# Patient Record
Sex: FEMALE | Race: WHITE | NOT HISPANIC OR LATINO | Employment: OTHER | URBAN - METROPOLITAN AREA
[De-identification: names, ages, dates, MRNs, and addresses within clinical notes are randomized per-mention and may not be internally consistent; named-entity substitution may affect disease eponyms.]

---

## 2017-06-01 ENCOUNTER — APPOINTMENT (OUTPATIENT)
Dept: RADIOLOGY | Facility: MEDICAL CENTER | Age: 82
DRG: 493 | End: 2017-06-01
Attending: EMERGENCY MEDICINE
Payer: MEDICARE

## 2017-06-01 ENCOUNTER — APPOINTMENT (OUTPATIENT)
Dept: RADIOLOGY | Facility: MEDICAL CENTER | Age: 82
DRG: 493 | End: 2017-06-01
Attending: ORTHOPAEDIC SURGERY
Payer: MEDICARE

## 2017-06-01 ENCOUNTER — HOSPITAL ENCOUNTER (INPATIENT)
Facility: MEDICAL CENTER | Age: 82
LOS: 1 days | DRG: 493 | End: 2017-06-05
Attending: EMERGENCY MEDICINE | Admitting: HOSPITALIST
Payer: MEDICARE

## 2017-06-01 ENCOUNTER — RESOLUTE PROFESSIONAL BILLING HOSPITAL PROF FEE (OUTPATIENT)
Dept: HOSPITALIST | Facility: MEDICAL CENTER | Age: 82
End: 2017-06-01
Payer: MEDICARE

## 2017-06-01 DIAGNOSIS — S82.891A ANKLE FRACTURE, RIGHT, CLOSED, INITIAL ENCOUNTER: ICD-10-CM

## 2017-06-01 LAB
ALBUMIN SERPL BCP-MCNC: 4.3 G/DL (ref 3.2–4.9)
ALBUMIN/GLOB SERPL: 1.2 G/DL
ALP SERPL-CCNC: 94 U/L (ref 30–99)
ALT SERPL-CCNC: 16 U/L (ref 2–50)
ANION GAP SERPL CALC-SCNC: 12 MMOL/L (ref 0–11.9)
APTT PPP: 27.8 SEC (ref 24.7–36)
AST SERPL-CCNC: 23 U/L (ref 12–45)
BASOPHILS # BLD AUTO: 1.7 % (ref 0–1.8)
BASOPHILS # BLD: 0.21 K/UL (ref 0–0.12)
BILIRUB SERPL-MCNC: 0.5 MG/DL (ref 0.1–1.5)
BUN SERPL-MCNC: 12 MG/DL (ref 8–22)
CALCIUM SERPL-MCNC: 9.6 MG/DL (ref 8.5–10.5)
CHLORIDE SERPL-SCNC: 105 MMOL/L (ref 96–112)
CO2 SERPL-SCNC: 23 MMOL/L (ref 20–33)
CREAT SERPL-MCNC: 0.76 MG/DL (ref 0.5–1.4)
EKG IMPRESSION: NORMAL
EOSINOPHIL # BLD AUTO: 0.33 K/UL (ref 0–0.51)
EOSINOPHIL NFR BLD: 2.6 % (ref 0–6.9)
ERYTHROCYTE [DISTWIDTH] IN BLOOD BY AUTOMATED COUNT: 45.7 FL (ref 35.9–50)
GFR SERPL CREATININE-BSD FRML MDRD: >60 ML/MIN/1.73 M 2
GLOBULIN SER CALC-MCNC: 3.5 G/DL (ref 1.9–3.5)
GLUCOSE SERPL-MCNC: 124 MG/DL (ref 65–99)
HCT VFR BLD AUTO: 36.8 % (ref 37–47)
HGB BLD-MCNC: 12.2 G/DL (ref 12–16)
INR PPP: 0.95 (ref 0.87–1.13)
LYMPHOCYTES # BLD AUTO: 1.53 K/UL (ref 1–4.8)
LYMPHOCYTES NFR BLD: 12.2 % (ref 22–41)
MAGNESIUM SERPL-MCNC: 1.8 MG/DL (ref 1.5–2.5)
MANUAL DIFF BLD: NORMAL
MCH RBC QN AUTO: 30 PG (ref 27–33)
MCHC RBC AUTO-ENTMCNC: 33.2 G/DL (ref 33.6–35)
MCV RBC AUTO: 90.6 FL (ref 81.4–97.8)
MONOCYTES # BLD AUTO: 0.65 K/UL (ref 0–0.85)
MONOCYTES NFR BLD AUTO: 5.2 % (ref 0–13.4)
MORPHOLOGY BLD-IMP: NORMAL
NEUTROPHILS # BLD AUTO: 9.79 K/UL (ref 2–7.15)
NEUTROPHILS NFR BLD: 78.3 % (ref 44–72)
NRBC # BLD AUTO: 0 K/UL
NRBC BLD AUTO-RTO: 0 /100 WBC
PLATELET # BLD AUTO: 382 K/UL (ref 164–446)
PLATELET BLD QL SMEAR: NORMAL
PMV BLD AUTO: 10.3 FL (ref 9–12.9)
POTASSIUM SERPL-SCNC: 3.5 MMOL/L (ref 3.6–5.5)
PROT SERPL-MCNC: 7.8 G/DL (ref 6–8.2)
PROTHROMBIN TIME: 13 SEC (ref 12–14.6)
RBC # BLD AUTO: 4.06 M/UL (ref 4.2–5.4)
RBC BLD AUTO: NORMAL
SODIUM SERPL-SCNC: 140 MMOL/L (ref 135–145)
WBC # BLD AUTO: 12.5 K/UL (ref 4.8–10.8)

## 2017-06-01 PROCEDURE — 29515 APPLICATION SHORT LEG SPLINT: CPT

## 2017-06-01 PROCEDURE — 0SSF04Z REPOSITION RIGHT ANKLE JOINT WITH INTERNAL FIXATION DEVICE, OPEN APPROACH: ICD-10-PCS | Performed by: ORTHOPAEDIC SURGERY

## 2017-06-01 PROCEDURE — 160002 HCHG RECOVERY MINUTES (STAT): Performed by: ORTHOPAEDIC SURGERY

## 2017-06-01 PROCEDURE — 502240 HCHG MISC OR SUPPLY RC 0272: Performed by: ORTHOPAEDIC SURGERY

## 2017-06-01 PROCEDURE — 500054 HCHG BANDAGE, ELASTIC 6: Performed by: ORTHOPAEDIC SURGERY

## 2017-06-01 PROCEDURE — 96375 TX/PRO/DX INJ NEW DRUG ADDON: CPT

## 2017-06-01 PROCEDURE — 73600 X-RAY EXAM OF ANKLE: CPT | Mod: RT

## 2017-06-01 PROCEDURE — 160022 HCHG BLOCK: Performed by: ORTHOPAEDIC SURGERY

## 2017-06-01 PROCEDURE — 27840 TREAT ANKLE DISLOCATION: CPT

## 2017-06-01 PROCEDURE — 0QSJXZZ REPOSITION RIGHT FIBULA, EXTERNAL APPROACH: ICD-10-PCS | Performed by: EMERGENCY MEDICINE

## 2017-06-01 PROCEDURE — 501445 HCHG STAPLER, SKIN DISP: Performed by: ORTHOPAEDIC SURGERY

## 2017-06-01 PROCEDURE — 700111 HCHG RX REV CODE 636 W/ 250 OVERRIDE (IP)

## 2017-06-01 PROCEDURE — G0378 HOSPITAL OBSERVATION PER HR: HCPCS

## 2017-06-01 PROCEDURE — 160009 HCHG ANES TIME/MIN: Performed by: ORTHOPAEDIC SURGERY

## 2017-06-01 PROCEDURE — 96374 THER/PROPH/DIAG INJ IV PUSH: CPT

## 2017-06-01 PROCEDURE — 0QSGXZZ REPOSITION RIGHT TIBIA, EXTERNAL APPROACH: ICD-10-PCS | Performed by: EMERGENCY MEDICINE

## 2017-06-01 PROCEDURE — 500881 HCHG PACK, EXTREMITY: Performed by: ORTHOPAEDIC SURGERY

## 2017-06-01 PROCEDURE — 700101 HCHG RX REV CODE 250

## 2017-06-01 PROCEDURE — 0QSJ04Z REPOSITION RIGHT FIBULA WITH INTERNAL FIXATION DEVICE, OPEN APPROACH: ICD-10-PCS | Performed by: ORTHOPAEDIC SURGERY

## 2017-06-01 PROCEDURE — 99285 EMERGENCY DEPT VISIT HI MDM: CPT

## 2017-06-01 PROCEDURE — 160029 HCHG SURGERY MINUTES - 1ST 30 MINS LEVEL 4: Performed by: ORTHOPAEDIC SURGERY

## 2017-06-01 PROCEDURE — A6222 GAUZE <=16 IN NO W/SAL W/O B: HCPCS | Performed by: ORTHOPAEDIC SURGERY

## 2017-06-01 PROCEDURE — 700105 HCHG RX REV CODE 258: Performed by: HOSPITALIST

## 2017-06-01 PROCEDURE — 73590 X-RAY EXAM OF LOWER LEG: CPT | Mod: RT

## 2017-06-01 PROCEDURE — 0QSG04Z REPOSITION RIGHT TIBIA WITH INTERNAL FIXATION DEVICE, OPEN APPROACH: ICD-10-PCS | Performed by: ORTHOPAEDIC SURGERY

## 2017-06-01 PROCEDURE — C1713 ANCHOR/SCREW BN/BN,TIS/BN: HCPCS | Performed by: ORTHOPAEDIC SURGERY

## 2017-06-01 PROCEDURE — 700111 HCHG RX REV CODE 636 W/ 250 OVERRIDE (IP): Performed by: HOSPITALIST

## 2017-06-01 PROCEDURE — A9270 NON-COVERED ITEM OR SERVICE: HCPCS | Performed by: HOSPITALIST

## 2017-06-01 PROCEDURE — 700111 HCHG RX REV CODE 636 W/ 250 OVERRIDE (IP): Performed by: EMERGENCY MEDICINE

## 2017-06-01 PROCEDURE — 302135 SEQUENTIAL COMPRESSION MACHINE: Performed by: ORTHOPAEDIC SURGERY

## 2017-06-01 PROCEDURE — 80053 COMPREHEN METABOLIC PANEL: CPT

## 2017-06-01 PROCEDURE — 160036 HCHG PACU - EA ADDL 30 MINS PHASE I: Performed by: ORTHOPAEDIC SURGERY

## 2017-06-01 PROCEDURE — 99219 PR INITIAL OBSERVATION CARE,LEVL II: CPT | Performed by: HOSPITALIST

## 2017-06-01 PROCEDURE — 160035 HCHG PACU - 1ST 60 MINS PHASE I: Performed by: ORTHOPAEDIC SURGERY

## 2017-06-01 PROCEDURE — 51798 US URINE CAPACITY MEASURE: CPT

## 2017-06-01 PROCEDURE — 71010 DX-CHEST-PORTABLE (1 VIEW): CPT

## 2017-06-01 PROCEDURE — 93005 ELECTROCARDIOGRAM TRACING: CPT | Performed by: EMERGENCY MEDICINE

## 2017-06-01 PROCEDURE — 501838 HCHG SUTURE GENERAL: Performed by: ORTHOPAEDIC SURGERY

## 2017-06-01 PROCEDURE — 700101 HCHG RX REV CODE 250: Performed by: HOSPITALIST

## 2017-06-01 PROCEDURE — 160041 HCHG SURGERY MINUTES - EA ADDL 1 MIN LEVEL 4: Performed by: ORTHOPAEDIC SURGERY

## 2017-06-01 PROCEDURE — 302874 HCHG BANDAGE ACE 2 OR 3""

## 2017-06-01 PROCEDURE — 160048 HCHG OR STATISTICAL LEVEL 1-5: Performed by: ORTHOPAEDIC SURGERY

## 2017-06-01 PROCEDURE — 85730 THROMBOPLASTIN TIME PARTIAL: CPT

## 2017-06-01 PROCEDURE — 700102 HCHG RX REV CODE 250 W/ 637 OVERRIDE(OP)

## 2017-06-01 PROCEDURE — 700102 HCHG RX REV CODE 250 W/ 637 OVERRIDE(OP): Performed by: HOSPITALIST

## 2017-06-01 PROCEDURE — 85027 COMPLETE CBC AUTOMATED: CPT

## 2017-06-01 PROCEDURE — 85007 BL SMEAR W/DIFF WBC COUNT: CPT

## 2017-06-01 PROCEDURE — A9270 NON-COVERED ITEM OR SERVICE: HCPCS

## 2017-06-01 PROCEDURE — 85610 PROTHROMBIN TIME: CPT

## 2017-06-01 PROCEDURE — 83735 ASSAY OF MAGNESIUM: CPT

## 2017-06-01 PROCEDURE — 36415 COLL VENOUS BLD VENIPUNCTURE: CPT

## 2017-06-01 PROCEDURE — 502000 HCHG MISC OR IMPLANTS RC 0278: Performed by: ORTHOPAEDIC SURGERY

## 2017-06-01 PROCEDURE — 503036 HCHG GUIDE PIN,OIC: Performed by: ORTHOPAEDIC SURGERY

## 2017-06-01 DEVICE — SCREW 2.5 MM NON-LOCKING TI X 22MM LONG (6TX8=48): Type: IMPLANTABLE DEVICE | Status: FUNCTIONAL

## 2017-06-01 DEVICE — PLATE DISTAL FIBULA 7H (2TX2+2TX1=6): Type: IMPLANTABLE DEVICE | Status: FUNCTIONAL

## 2017-06-01 DEVICE — SCREW 3.5 MM NON-LOCKING TI X 12MM LONG (6TX8+2TX5=58): Type: IMPLANTABLE DEVICE | Status: FUNCTIONAL

## 2017-06-01 DEVICE — SCREW 2.5 MM LOCKING TI X 10MM LONG (6TX8=48): Type: IMPLANTABLE DEVICE | Status: FUNCTIONAL

## 2017-06-01 DEVICE — SCREW 2.5 MM LOCKING TI X 12MM LONG (6TX8=48): Type: IMPLANTABLE DEVICE | Status: FUNCTIONAL

## 2017-06-01 DEVICE — SCREW 3.5 MM NON-LOCKING TI X 45MM LONG (6TX8=48): Type: IMPLANTABLE DEVICE | Status: FUNCTIONAL

## 2017-06-01 DEVICE — SCREW CANN 4.0X46 LONG OIC - (3TX2=6): Type: IMPLANTABLE DEVICE | Status: FUNCTIONAL

## 2017-06-01 DEVICE — SCREW 3.5 MM LOCKING TI X 12MM LONG (6TX8+2TX5=58): Type: IMPLANTABLE DEVICE | Status: FUNCTIONAL

## 2017-06-01 RX ORDER — CITALOPRAM 20 MG/1
20 TABLET ORAL DAILY
Status: DISCONTINUED | OUTPATIENT
Start: 2017-06-01 | End: 2017-06-05 | Stop reason: HOSPADM

## 2017-06-01 RX ORDER — ONDANSETRON 2 MG/ML
4 INJECTION INTRAMUSCULAR; INTRAVENOUS EVERY 4 HOURS PRN
Status: DISCONTINUED | OUTPATIENT
Start: 2017-06-01 | End: 2017-06-05 | Stop reason: HOSPADM

## 2017-06-01 RX ORDER — TRAMADOL HYDROCHLORIDE 50 MG/1
50 TABLET ORAL EVERY 6 HOURS PRN
Status: DISCONTINUED | OUTPATIENT
Start: 2017-06-01 | End: 2017-06-05 | Stop reason: HOSPADM

## 2017-06-01 RX ORDER — AMLODIPINE BESYLATE 10 MG/1
10 TABLET ORAL DAILY
Status: DISCONTINUED | OUTPATIENT
Start: 2017-06-01 | End: 2017-06-05 | Stop reason: HOSPADM

## 2017-06-01 RX ORDER — ONDANSETRON 4 MG/1
4 TABLET, ORALLY DISINTEGRATING ORAL EVERY 4 HOURS PRN
Status: DISCONTINUED | OUTPATIENT
Start: 2017-06-01 | End: 2017-06-05 | Stop reason: HOSPADM

## 2017-06-01 RX ORDER — BISACODYL 10 MG
10 SUPPOSITORY, RECTAL RECTAL
Status: DISCONTINUED | OUTPATIENT
Start: 2017-06-02 | End: 2017-06-05 | Stop reason: HOSPADM

## 2017-06-01 RX ORDER — CITALOPRAM 20 MG/1
20 TABLET ORAL DAILY
COMMUNITY
End: 2017-06-10

## 2017-06-01 RX ORDER — AMOXICILLIN 250 MG
2 CAPSULE ORAL 2 TIMES DAILY
Status: DISCONTINUED | OUTPATIENT
Start: 2017-06-02 | End: 2017-06-05 | Stop reason: HOSPADM

## 2017-06-01 RX ORDER — MORPHINE SULFATE 4 MG/ML
2-4 INJECTION, SOLUTION INTRAMUSCULAR; INTRAVENOUS
Status: DISCONTINUED | OUTPATIENT
Start: 2017-06-01 | End: 2017-06-05 | Stop reason: HOSPADM

## 2017-06-01 RX ORDER — OXYCODONE HYDROCHLORIDE 10 MG/1
10 TABLET ORAL EVERY 4 HOURS PRN
Status: DISCONTINUED | OUTPATIENT
Start: 2017-06-01 | End: 2017-06-05 | Stop reason: HOSPADM

## 2017-06-01 RX ORDER — MORPHINE SULFATE 4 MG/ML
2 INJECTION, SOLUTION INTRAMUSCULAR; INTRAVENOUS
Status: DISCONTINUED | OUTPATIENT
Start: 2017-06-01 | End: 2017-06-01

## 2017-06-01 RX ORDER — OXYCODONE HCL 5 MG/5 ML
SOLUTION, ORAL ORAL
Status: DISPENSED
Start: 2017-06-01 | End: 2017-06-02

## 2017-06-01 RX ORDER — ACETAMINOPHEN 325 MG/1
650 TABLET ORAL EVERY 6 HOURS PRN
Status: DISCONTINUED | OUTPATIENT
Start: 2017-06-01 | End: 2017-06-05 | Stop reason: HOSPADM

## 2017-06-01 RX ORDER — DEXAMETHASONE SODIUM PHOSPHATE 4 MG/ML
10 INJECTION, SOLUTION INTRA-ARTICULAR; INTRALESIONAL; INTRAMUSCULAR; INTRAVENOUS; SOFT TISSUE ONCE
Status: COMPLETED | OUTPATIENT
Start: 2017-06-02 | End: 2017-06-02

## 2017-06-01 RX ORDER — SODIUM CHLORIDE AND POTASSIUM CHLORIDE 150; 900 MG/100ML; MG/100ML
INJECTION, SOLUTION INTRAVENOUS ONCE
Status: COMPLETED | OUTPATIENT
Start: 2017-06-01 | End: 2017-06-01

## 2017-06-01 RX ORDER — OXYCODONE HYDROCHLORIDE 5 MG/1
5 TABLET ORAL EVERY 4 HOURS PRN
Status: DISCONTINUED | OUTPATIENT
Start: 2017-06-01 | End: 2017-06-05 | Stop reason: HOSPADM

## 2017-06-01 RX ORDER — ENALAPRILAT 1.25 MG/ML
1.25 INJECTION INTRAVENOUS EVERY 6 HOURS PRN
Status: DISCONTINUED | OUTPATIENT
Start: 2017-06-01 | End: 2017-06-05 | Stop reason: HOSPADM

## 2017-06-01 RX ORDER — ONDANSETRON 2 MG/ML
4 INJECTION INTRAMUSCULAR; INTRAVENOUS ONCE
Status: COMPLETED | OUTPATIENT
Start: 2017-06-01 | End: 2017-06-01

## 2017-06-01 RX ORDER — POLYETHYLENE GLYCOL 3350 17 G/17G
1 POWDER, FOR SOLUTION ORAL
Status: DISCONTINUED | OUTPATIENT
Start: 2017-06-02 | End: 2017-06-05 | Stop reason: HOSPADM

## 2017-06-01 RX ORDER — AMLODIPINE BESYLATE 10 MG/1
10 TABLET ORAL DAILY
COMMUNITY
End: 2021-08-16

## 2017-06-01 RX ORDER — ROSUVASTATIN CALCIUM 40 MG/1
40 TABLET, COATED ORAL
COMMUNITY
End: 2019-05-07 | Stop reason: CLARIF

## 2017-06-01 RX ORDER — ROSUVASTATIN CALCIUM 20 MG/1
40 TABLET, COATED ORAL
Status: DISCONTINUED | OUTPATIENT
Start: 2017-06-01 | End: 2017-06-05 | Stop reason: HOSPADM

## 2017-06-01 RX ADMIN — POTASSIUM CHLORIDE AND SODIUM CHLORIDE: 900; 150 INJECTION, SOLUTION INTRAVENOUS at 12:22

## 2017-06-01 RX ADMIN — ONDANSETRON 4 MG: 2 INJECTION INTRAMUSCULAR; INTRAVENOUS at 08:11

## 2017-06-01 RX ADMIN — AMLODIPINE BESYLATE 10 MG: 10 TABLET ORAL at 12:23

## 2017-06-01 RX ADMIN — CITALOPRAM HYDROBROMIDE 20 MG: 20 TABLET ORAL at 12:23

## 2017-06-01 RX ADMIN — MORPHINE SULFATE 2 MG: 4 INJECTION INTRAVENOUS at 08:11

## 2017-06-01 RX ADMIN — ROSUVASTATIN CALCIUM 40 MG: 20 TABLET ORAL at 21:10

## 2017-06-01 RX ADMIN — MAGNESIUM SULFATE HEPTAHYDRATE 2 G: 500 INJECTION, SOLUTION INTRAMUSCULAR; INTRAVENOUS at 12:23

## 2017-06-01 ASSESSMENT — PAIN SCALES - GENERAL
PAINLEVEL_OUTOF10: 2
PAINLEVEL_OUTOF10: 5
PAINLEVEL_OUTOF10: 0
PAINLEVEL_OUTOF10: 2

## 2017-06-01 ASSESSMENT — LIFESTYLE VARIABLES
EVER HAD A DRINK FIRST THING IN THE MORNING TO STEADY YOUR NERVES TO GET RID OF A HANGOVER: NO
HAVE PEOPLE ANNOYED YOU BY CRITICIZING YOUR DRINKING: NO
TOTAL SCORE: 0
AVERAGE NUMBER OF DAYS PER WEEK YOU HAVE A DRINK CONTAINING ALCOHOL: 0
CONSUMPTION TOTAL: NEGATIVE
HAVE YOU EVER FELT YOU SHOULD CUT DOWN ON YOUR DRINKING: NO
TOTAL SCORE: 0
DO YOU DRINK ALCOHOL: YES
EVER_SMOKED: NEVER
ON A TYPICAL DAY WHEN YOU DRINK ALCOHOL HOW MANY DRINKS DO YOU HAVE: 1
TOTAL SCORE: 0
EVER FELT BAD OR GUILTY ABOUT YOUR DRINKING: NO
HOW MANY TIMES IN THE PAST YEAR HAVE YOU HAD 5 OR MORE DRINKS IN A DAY: 0

## 2017-06-01 NOTE — ED NOTES
Rounded on pt. Pt attempted to urinate with bedpan, unsuccessful. Updated on POC, aware of plan for admission. States no other needs at this time.

## 2017-06-01 NOTE — ED NOTES
Pt brought to [facility] via wheelchair, accompanied by family. 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 Pulses strong. Pt assisted onto gurney, attached to monitor.

## 2017-06-01 NOTE — IP AVS SNAPSHOT
" <p align=\"LEFT\"><IMG SRC=\"[email or web address]\" alt=\"Image\" WIDTH=\"50%\" HEIGHT=\"200\" BORDER=\"\"></p>                   Name:Rebecca Bishop  Medical Record Number:[id number]  CSN: [id number]    YOB: 1935   Age: 82 y.o.  Sex: female  HT:1.524 m (5') WT: 45.36 kg (100 lb)          Admit Date: 6/1/2017     Discharge Date:   Today's Date: 6/5/2017  Attending Doctor:  Deirdre Montero D.O.                  Allergies:  Review of patient's allergies indicates no known allergies.          Your appointments     Jun 06, 2017 To Be Determined   [facility] with Suzanne Severson, R.N.   [facility] (--)    [address] [place] [address].  [facility] [address]   [phone]              Follow-up Information     1. Follow up with Duane Patel D.O.. Schedule an appointment as soon as possible for a visit in 1 week.    Specialty:  Family Medicine    Contact information    [address]  [facility] [id number] [phone]          2. Follow up with Ricardo Luna M.D.. Schedule an appointment as soon as possible for a visit in 2 weeks.    Specialty:  Orthopaedics    Contact information    [address]  [address]  [facility] [address]  [phone]           Medication List      Take these Medications        Instructions    amlodipine 10 MG Tabs   Commonly known as:  NORVASC    Take 10 mg by mouth every day.   Dose:  10 mg       aspirin EC 81 MG Tbec   Commonly known as:  ECOTRIN    Take 81 mg by mouth every day.   Dose:  81 mg       citalopram 20 MG Tabs   Commonly known as:  CELEXA    Take 20 mg by mouth every day.   Dose:  20 mg       rosuvastatin 40 MG tablet   Commonly known as:  CRESTOR    Take 40 mg by mouth every bedtime.   Dose:  40 mg         "

## 2017-06-01 NOTE — ED NOTES
Rounded on pt. Family at bedside. Warm blanket provided for comfort. Updated on POC, aware of NPO status. States no other needs at this time. Awaiting orthopedist.

## 2017-06-01 NOTE — IP AVS SNAPSHOT
Home Care Instructions                                                                                                                  Name:Rebecca Bishop  Medical Record Number:[id number]  CSN: [id number]    YOB: 1935   Age: 82 y.o.  Sex: female  HT:1.524 m (5') WT: 45.36 kg (100 lb)          Admit Date: 6/1/2017     Discharge Date:   Today's Date: 6/5/2017  Attending Doctor:  Deirdre Montero D.O.                  Allergies:  Review of patient's allergies indicates no known allergies.            Discharge Instructions       Discharge Instructions    Discharged to home by car with relative. Discharged via wheelchair, hospital escort: Refused.  Special equipment needed: Walker    Be sure to schedule a follow-up appointment with your primary care doctor or any specialists as instructed.     Discharge Plan:   Diet Plan: Discussed  Activity Level: Discussed  Confirmed Follow up Appointment: Patient to Call and Schedule Appointment  Confirmed Symptoms Management: Discussed  Medication Reconciliation Updated: Yes  Influenza Vaccine Indication: Patient Refuses    I understand that a diet low in cholesterol, fat, and sodium is recommended for good health. Unless I have been given specific instructions below for another diet, I accept this instruction as my diet prescription.   Other diet: regular    Special Instructions: Discharge instructions for the Orthopedic Patient    Follow up with Primary Care Physician within 2 weeks of discharge to home, regarding:  Review of medications and diagnostic testing.  Surveillance for medical complications.  Workup and treatment of osteoporosis, if appropriate.     -Is this a Joint Replacement patient? No    -Is this patient being discharged with medication to prevent blood clots?  Yes, Aspirin Aspirin, ASA oral tablets  What is this medicine?  ASPIRIN (AS pir in) is a pain reliever. It is used to treat mild pain and fever. This medicine is also used as directed by a doctor to  prevent and to treat heart attacks, to prevent strokes, and to treat arthritis or inflammation.  This medicine may be used for other purposes; ask your health care provider or pharmacist if you have questions.  COMMON BRAND NAME(S): Aspir-Low, Aspir-Coleen , Aspirtab , Rob Advanced Aspirin, Rob Aspirin Extra Strength, Rob Aspirin Plus, Rob Aspirin, Rob Genuine Aspirin, Rob Womens Aspirin , Bufferin Extra Strength, Bufferin Low Dose, Bufferin  What should I tell my health care provider before I take this medicine?  They need to know if you have any of these conditions:  -anemia  -asthma  -bleeding problems  -child with chickenpox, the flu, or other viral infection  -diabetes  -gout  -if you frequently drink alcohol containing drinks  -kidney disease  -liver disease  -low level of vitamin K  -lupus  -smoke tobacco  -stomach ulcers or other problems  -an unusual or allergic reaction to aspirin, tartrazine dye, other medicines, dyes, or preservatives  -pregnant or trying to get pregnant  -breast-feeding  How should I use this medicine?  Take this medicine by mouth with a glass of water. Follow the directions on the package or prescription label. You can take this medicine with or without food. If it upsets your stomach, take it with food. Do not take your medicine more often than directed.  Talk to your pediatrician regarding the use of this medicine in children. While this drug may be prescribed for children as young as 12 years of age for selected conditions, precautions do apply. Children and teenagers should not use this medicine to treat chicken pox or flu symptoms unless directed by a doctor.  Patients over 65 years old may have a stronger reaction and need a smaller dose.  Overdosage: If you think you have taken too much of this medicine contact a poison control center or emergency room at once.  NOTE: This medicine is only for you. Do not share this medicine with others.  What if I miss a dose?  If you  are taking this medicine on a regular schedule and miss a dose, take it as soon as you can. If it is almost time for your next dose, take only that dose. Do not take double or extra doses.  What may interact with this medicine?  Do not take this medicine with any of the following medications:  -cidofovir  -ketorolac  -probenecid  This medicine may also interact with the following medications:  -alcohol  -alendronate  -bismuth subsalicylate  -flavocoxid  -herbal supplements like feverfew, garlic, sandra, ginkgo biloba, horse chestnut  -medicines for diabetes or glaucoma like acetazolamide, methazolamide  -medicines for gout  -medicines that treat or prevent blood clots like enoxaparin, heparin, ticlopidine, warfarin  -other aspirin and aspirin-like medicines  -NSAIDs, medicines for pain and inflammation, like ibuprofen or naproxen  -pemetrexed  -sulfinpyrazone  -varicella live vaccine  This list may not describe all possible interactions. Give your health care provider a list of all the medicines, herbs, non-prescription drugs, or dietary supplements you use. Also tell them if you smoke, drink alcohol, or use illegal drugs. Some items may interact with your medicine.  What should I watch for while using this medicine?  If you are treating yourself for pain, tell your doctor or health care professional if the pain lasts more than 10 days, if it gets worse, or if there is a new or different kind of pain. Tell your doctor if you see redness or swelling. Also, check with your doctor if you have a fever that lasts for more than 3 days. Only take this medicine to prevent heart attacks or blood clotting if prescribed by your doctor or health care professional.  Do not take aspirin or aspirin-like medicines with this medicine. Too much aspirin can be dangerous. Always read the labels carefully.  This medicine can irritate your stomach or cause bleeding problems. Do not smoke cigarettes or drink alcohol while taking this  medicine. Do not lie down for 30 minutes after taking this medicine to prevent irritation to your throat.  If you are scheduled for any medical or dental procedure, tell your healthcare provider that you are taking this medicine. You may need to stop taking this medicine before the procedure.  What side effects may I notice from receiving this medicine?  Side effects that you should report to your doctor or health care professional as soon as possible:  -allergic reactions like skin rash, itching or hives, swelling of the face, lips, or tongue  -black, tarry stools  -bloody, coffee ground-like vomit  -breathing problems  -changes in hearing, ringing in the ears  -confusion  -general ill feeling or flu-like symptoms  -pain on swallowing  -redness, blistering, peeling or loosening of the skin, including inside the mouth or nose  -trouble passing urine or change in the amount of urine  -unusual bleeding or bruising  -unusually weak or tired  -yellowing of the eyes or skin  Side effects that usually do not require medical attention (report to your doctor or health care professional if they continue or are bothersome):  -diarrhea or constipation  -nausea, vomiting  -stomach gas, heartburn  This list may not describe all possible side effects. Call your doctor for medical advice about side effects. You may report side effects to FDA at 2-643-FDA-0353.  Where should I keep my medicine?  Keep out of the reach of children.  Store at room temperature between 15 and 30 degrees C (59 and 86 degrees F). Protect from heat and moisture. Do not use this medicine if it has a strong vinegar smell. Throw away any unused medicine after the expiration date.  NOTE: This sheet is a summary. It may not cover all possible information. If you have questions about this medicine, talk to your doctor, pharmacist, or health care provider.  © 2014, [employer]vier/Gold Standard. (3/10/2009 10:44:17 AM)      · Is patient discharged on Warfarin /  Coumadin?   No     · Is patient Post Blood Transfusion?  No    Depression / Suicide Risk    As you are discharged from this [facility], it is important to learn how to keep safe from harming yourself.    Recognize the warning signs:  · Abrupt changes in personality, positive or negative- including increase in energy   · Giving away possessions  · Change in eating patterns- significant weight changes-  positive or negative  · Change in sleeping patterns- unable to sleep or sleeping all the time   · Unwillingness or inability to communicate  · Depression  · Unusual sadness, discouragement and loneliness  · Talk of wanting to die  · Neglect of personal appearance   · Rebelliousness- reckless behavior  · Withdrawal from people/activities they love  · Confusion- inability to concentrate     If you or a loved one observes any of these behaviors or has concerns about self-harm, here's what you can do:  · Talk about it- your feelings and reasons for harming yourself  · Remove any means that you might use to hurt yourself (examples: pills, rope, extension cords, firearm)  · Get professional help from the community (Mental Health, Substance Abuse, psychological counseling)  · Do not be alone:Call your Safe Contact- someone whom you trust who will be there for you.  · Call your local CRISIS HOTLINE 425-2353 or 784-782-9927  · Call your local Children's Mobile Crisis Response Team [place] (786) 416-5442 or www.[employer]  · Call the toll free National Suicide Prevention Hotlines   · National Suicide Prevention Lifeline 848-622-BVBZ (6866)  · National Hope Line Network 800-SUICIDE (783-0550)        Your appointments     Jun 06, 2017 To Be Determined   [facility] CARE with Suzanne Severson, R.N.   [facility] (--)    [facility] [address]  [facility] [address]   [phone]              Follow-up Information     1. Follow up with Duane Patel D.O.. Schedule an appointment as soon as possible  for a visit in 1 week.    Specialty:  Family Medicine    Contact information    [address] [place] [address]  [phone]          2. Follow up with Ricardo Luna M.D.. Schedule an appointment as soon as possible for a visit in 2 weeks.    Specialty:  Orthopaedics    Contact information    [address]  [address]  [place] [address]  [phone]           Discharge Medication Instructions:    Below are the medications your physician expects you to take upon discharge:    Review all your home medications and newly ordered medications with your doctor and/or pharmacist. Follow medication instructions as directed by your doctor and/or pharmacist.    Please keep your medication list with you and share with your physician.               Medication List      CONTINUE taking these medications        Instructions    Morning Afternoon Evening Bedtime    amlodipine 10 MG Tabs   Last time this was given:  10 mg on 6/5/2017  7:51 AM   Commonly known as:  NORVASC        Take 10 mg by mouth every day.   Dose:  10 mg                        aspirin EC 81 MG Tbec   Last time this was given:  81 mg on 6/5/2017  7:50 AM   Commonly known as:  ECOTRIN        Take 81 mg by mouth every day.   Dose:  81 mg                        citalopram 20 MG Tabs   Last time this was given:  20 mg on 6/5/2017  7:50 AM   Commonly known as:  CELEXA        Take 20 mg by mouth every day.   Dose:  20 mg                        rosuvastatin 40 MG tablet   Last time this was given:  40 mg on 6/4/2017  8:28 PM   Commonly known as:  CRESTOR        Take 40 mg by mouth every bedtime.   Dose:  40 mg                                Orders for after discharge     DME Bedside Commode    Complete by:  As directed        DME Walker    Complete by:  As directed        REFERRAL TO HOME HEALTH    Complete by:  As directed    Home health will create and establish a plan of care       REFERRAL TO PHYSIATRY (PMR)    Complete by:  As directed        REFERRAL TO SKILLED  NURSING FACILITY    Complete by:  As directed              Instructions           Diet / Nutrition:    Follow any diet instructions given to you by your doctor or the dietician, including how much salt (sodium) you are allowed each day.    If you are overweight, talk to your doctor about a weight reduction plan.    Activity:    Remain physically active following your doctor's instructions about exercise and activity.    Rest often.     Any time you become even a little tired or short of breath, SIT DOWN and rest.    Worsening Symptoms:    Report any of the following signs and symptoms to the doctor's office immediately:    *Pain of jaw, arm, or neck  *Chest pain not relieved by medication                               *Dizziness or loss of consciousness  *Difficulty breathing even when at rest   *More tired than usual                                       *Bleeding drainage or swelling of surgical site  *Swelling of feet, ankles, legs or stomach                 *Fever (>100ºF)  *Pink or blood tinged sputum  *Weight gain (3lbs/day or 5lbs /week)           *Shock from internal defibrillator (if applicable)  *Palpitations or irregular heartbeats                *Cool and/or numb extremities    Stroke Awareness    Common Risk Factors for Stroke include:    Age  Atrial Fibrillation  Carotid Artery Stenosis  Diabetes Mellitus  Excessive alcohol consumption  High blood pressure  Overweight   Physical inactivity  Smoking    Warning signs and symptoms of a stroke include:    *Sudden numbness or weakness of the face, arm or leg (especially on one side of the body).  *Sudden confusion, trouble speaking or understanding.  *Sudden trouble seeing in one or both eyes.  *Sudden trouble walking, dizziness, loss of balance or coordination.Sudden severe headache with no known cause.    It is very important to get treatment quickly when a stroke occurs. If you experience any of the above warning signs, call 911 immediately.                    Disclaimer         Quit Smoking / Tobacco Use:    I understand the use of any tobacco products increases my chance of suffering from future heart disease or stroke and could cause other illnesses which may shorten my life. Quitting the use of tobacco products is the single most important thing I can do to improve my health. For further information on smoking / tobacco cessation call a Toll Free Quit Line at 1-863.969.4192 (*National Cancer [place]) or 1-999.889.6365 (American Lung Association) or you can access the web based program at www.lungusa.org.    [place] Tobacco Users Help Line:  (617) 931-5334       Toll Free: 1-672.168.3344  Quit Tobacco Program [facility] (730)287-5976    Crisis Hotline:    [place] Crisis Hotline:  4-732-IETYCMC or 1-539.456.4395    [place] Crisis Hotline:    1-279.901.2915 or 780-636-1449    Discharge Survey:   Thank you for choosing [facility]. We hope we did everything we could to make your hospital stay a pleasant one. You may be receiving a phone survey and we would appreciate your time and participation in answering the questions. Your input is very valuable to us in our efforts to improve our service to our patients and their families.        My signature on this form indicates that:    1. I have reviewed and understand the above information.  2. My questions regarding this information have been answered to my satisfaction.  3. I have formulated a plan with my discharge nurse to obtain my prescribed medications for home.                  Disclaimer         __________________________________                     __________       ________                       Patient Signature                                                 Date                    Time

## 2017-06-01 NOTE — PROGRESS NOTES
Report received from Palak in ER.  Pt a & o x4. Pain 0 /10. Family present. Skin assessment complete with SHERRELL Muir. Splint in place, all other skin intact  Pt oriented to bed, unit, and unit procedures.   Assessment complete.  Call light and belongings within reach. Will continue to assess.

## 2017-06-01 NOTE — ED PROVIDER NOTES
"ED Provider Note    CHIEF COMPLAINT  Chief Complaint   Patient presents with   • T-5000 GLF     right ankle, GLF in bathroom. pt denies dizziness. Hx knee problems in leg, reports that leg \"just gave out.\" + deformity, CMS+       HPI  Rebecca Bishop is a 82 y.o. female who presents after a ground-level fall. Patient's right knee gave out and she ended up falling twisting her right ankle underneath her. She denies pain in the knee. She has pain in the right ankle. Dull throbbing moderate nonradiating unable to ambulate because of pain. No head injury neck pain back pain weakness numbness neurologic symptoms or syncope.    REVIEW OF SYSTEMS  As per HPI, otherwise a 10 point review of systems is negative    PAST MEDICAL HISTORY  Past Medical History   Diagnosis Date   • Hypertension    • Stroke (CMS-HCC)    • TIA (transient ischemic attack) 1/2015       SOCIAL HISTORY  Social History   Substance Use Topics   • Smoking status: Never Smoker    • Smokeless tobacco: None   • Alcohol Use: Yes      Comment: SOCIAL       SURGICAL HISTORY  History reviewed. No pertinent past surgical history.    CURRENT MEDICATIONS  Home Medications     Reviewed by Anny Jolly R.N. (Registered Nurse) on 06/01/17 at 0747  Med List Status: <None>    Medication Last Dose Status    amlodipine-benazepril (LOTREL) 10-40 MG per capsule Unknown Active    azelastine (ASTELIN) 137 MCG/SPRAY nasal spray Unknown Active    citalopram (CELEXA) 20 MG TABS Unknown Active                ALLERGIES  No Known Allergies    PHYSICAL EXAM  VITAL SIGNS: /62 mmHg  Pulse 75  Temp(Src) 36.9 °C (98.4 °F) (Temporal)  Resp 16  Ht 1.524 m (5')  Wt 45.36 kg (100 lb)  BMI 19.53 kg/m2  SpO2 97%   Constitutional: Awake and alert  HENT:  Atraumatic, Normocephalic.Oropharynx moist mucus membranes, Nose normal inspection.   Eyes: Normal inspection  Neck: Supple  Cardiovascular: Normal heart rate, Normal rhythm.  Symmetric peripheral pulses.   Thorax & Lungs: No " respiratory distress, No wheezing, No rales, No rhonchi, No chest tenderness.   Abdomen: Bowel sounds normal, soft, non-distended, nontender, no mass  Skin: Warm, Dry, No rash.   Back: No midline thoracic or lumbar tenderness, No CVA tenderness.   Extremities: Obvious deformity right ankle. Minimal tenderness mid tibia. No pain or tenderness over the knee hip.  Neurologic: Grossly normal   Psychiatric: Anxious appearing    RADIOLOGY/PROCEDURES  DX-TIBIA AND FIBULA RIGHT   Final Result      Status post closed reduction comminuted fracture dislocation of the right ankle.      DX-ANKLE 2- VIEWS RIGHT   Final Result      Fracture dislocation at the ankle with surrounding soft tissue swelling.      DX-CHEST-PORTABLE (1 VIEW)    (Results Pending)      Imaging is interpreted by radiologist    Joint Reduction Procedure Note    Indication: Right ankle trimalleolar fracture dislocation    Consent: The patient was counseled regarding the procedure, it's indications, risks, potential complications and alternatives and any questions were answered. Consent was obtained.    Procedure: The pre-reduction exam showed distal perfusion & neurologic function to be normal. The patient was placed in the appropriate position. Reduction of the right ankle was performed by traction and counter traction. Post reduction films were obtained and revealed satisfactory reduction. A post-reduction exam revealed distal perfusion & neurologic function to be normal. The affected area was immobilized with a posterior ankle splint.    The patient tolerated the procedure well.    Complications: None    Labs:  Results for orders placed or performed during the hospital encounter of 06/01/17   CBC w/ Differential   Result Value Ref Range    WBC 12.5 (H) 4.8 - 10.8 K/uL    RBC 4.06 (L) 4.20 - 5.40 M/uL    Hemoglobin 12.2 12.0 - 16.0 g/dL    Hematocrit 36.8 (L) 37.0 - 47.0 %    MCV 90.6 81.4 - 97.8 fL    MCH 30.0 27.0 - 33.0 pg    MCHC 33.2 (L) 33.6 - 35.0  g/dL    RDW 45.7 35.9 - 50.0 fL    Platelet Count 382 164 - 446 K/uL    MPV 10.3 9.0 - 12.9 fL    Nucleated RBC 0.00 /100 WBC    NRBC (Absolute) 0.00 K/uL    Neutrophils-Polys 78.30 (H) 44.00 - 72.00 %    Lymphocytes 12.20 (L) 22.00 - 41.00 %    Monocytes 5.20 0.00 - 13.40 %    Eosinophils 2.60 0.00 - 6.90 %    Basophils 1.70 0.00 - 1.80 %    Neutrophils (Absolute) 9.79 (H) 2.00 - 7.15 K/uL    Lymphs (Absolute) 1.53 1.00 - 4.80 K/uL    Monos (Absolute) 0.65 0.00 - 0.85 K/uL    Eos (Absolute) 0.33 0.00 - 0.51 K/uL    Baso (Absolute) 0.21 (H) 0.00 - 0.12 K/uL   Complete Metabolic Panel (CMP)   Result Value Ref Range    Sodium 140 135 - 145 mmol/L    Potassium 3.5 (L) 3.6 - 5.5 mmol/L    Chloride 105 96 - 112 mmol/L    Co2 23 20 - 33 mmol/L    Anion Gap 12.0 (H) 0.0 - 11.9    Glucose 124 (H) 65 - 99 mg/dL    Bun 12 8 - 22 mg/dL    Creatinine 0.76 0.50 - 1.40 mg/dL    Calcium 9.6 8.5 - 10.5 mg/dL    AST(SGOT) 23 12 - 45 U/L    ALT(SGPT) 16 2 - 50 U/L    Alkaline Phosphatase 94 30 - 99 U/L    Total Bilirubin 0.5 0.1 - 1.5 mg/dL    Albumin 4.3 3.2 - 4.9 g/dL    Total Protein 7.8 6.0 - 8.2 g/dL    Globulin 3.5 1.9 - 3.5 g/dL    A-G Ratio 1.2 g/dL   Prothrombin (PT/INR)   Result Value Ref Range    PT 13.0 12.0 - 14.6 sec    INR 0.95 0.87 - 1.13   APTT   Result Value Ref Range    APTT 27.8 24.7 - 36.0 sec   ESTIMATED GFR   Result Value Ref Range    GFR If African American >60 >60 mL/min/1.73 m 2    GFR If Non African American >60 >60 mL/min/1.73 m 2   DIFFERENTIAL MANUAL   Result Value Ref Range    Manual Diff Status PERFORMED    PERIPHERAL SMEAR REVIEW   Result Value Ref Range    Peripheral Smear Review see below    PLATELET ESTIMATE   Result Value Ref Range    Plt Estimation Normal    MORPHOLOGY   Result Value Ref Range    RBC Morphology Normal    EKG (NOW)   Result Value Ref Range    Report       [facility] Emergency Dept.    Test Date:  2017-06-01  Pt Name:    RIGOBERTO TAYLOR                  Department:  ER  MRN:        [id number]                      Room:        21  Gender:     F                            Technician: [id number]  :        [id number]                   Requested By:BELKYS PÉREZ  Order #:    [id number]                    Reading MD: BELKYS PÉREZ MD    Measurements  Intervals                                Axis  Rate:       70                           P:          0  MO:         232                          QRS:        22  QRSD:       76                           T:          35  QT:         416  QTc:        449    Interpretive Statements  SINUS RHYTHM  FIRST DEGREE AV BLOCK  Compared to ECG 2015 17:08:47  First degree AV block now present    Electronically Signed On 2017 9:31:38 PDT by BELKYS PÉREZ MD             COURSE & MEDICAL DECISION MAKING  Patient presents with fracture dislocation of the right ankle. She was given morphine intravenously. Declined additional pain meds. X-rays were reviewed and her fracture was reduced as noted above. I consulted Dr. Luna. He plans to take to the operating room today. Preoperative data was ordered. Patient will be admitted to the medicine service. Dr. Jorge was notified.    FINAL IMPRESSION  1. Closed right trimalleolar fracture dislocation  2. Closed reduction right trimalleolar fracture dislocation by me        This dictation was created using voice recognition software. The accuracy of the dictation is limited to the abilities of the software.  The nursing notes were reviewed and certain aspects of this information were incorporated into this note.      Electronically signed by: Belkys Pérez, 2017 9:28 AM

## 2017-06-01 NOTE — OR SURGEON
Immediate Post-Operative Note      PreOp Diagnosis: Right trimalleolar ankle fracture    PostOp Diagnosis: same    Procedure(s):  ANKLE ORIF - Wound Class: Clean    Surgeon(s):  JOSÉ Jaffe M.D.    Anesthesiologist/Type of Anesthesia:  Anesthesiologist: Sailaja Strong M.D./General    Surgical Staff:  Circulator: Ashia Sweeney R.N.  Scrub Person: Amber Meade  Radiology Technologist: Maribell Allen    Specimen: None    Estimated Blood Loss: 25 mL    Findings: See op note    Complications: None        6/1/2017 4:42 PM Ricardo Luna

## 2017-06-01 NOTE — ED NOTES
The Medication Reconciliation process has been completed by interviewing the patient and calling [facility]    Allergies have been reviewed  Antibiotic use in 30 days - NONE    Home Pharmacy:  [facility]

## 2017-06-01 NOTE — ED NOTES
Pt undressed, aware of plan for surgery. States NPO since dinner last night. All belongings with family, no jewelry. Pt states pain has improved, state sis tolerable, denies need for more pain medication at this time.

## 2017-06-01 NOTE — IP AVS SNAPSHOT
Phybridge Access Code: [email or web address]  Expires: 7/5/2017  3:45 PM    Your email address is not on file at [facility].  Email Addresses are required for you to sign up for Phybridge, please contact [phone] to verify your personal information and to provide your email address prior to attempting to register for Phybridge.    Rebecca Bishop  [address]  [place] [address]    Phybridge  A secure, online tool to manage your health information     [facility]’s Phybridge® is a secure, online tool that connects you to your personalized health information from the privacy of your home -- day or night - making it very easy for you to manage your healthcare. Once the activation process is completed, you can even access your medical information using the Phybridge tito, which is available for free in the Apple Tito store or Google Play store.     To learn more about Phybridge, visit [email or web address]    There are two levels of access available (as shown below):   My Chart Features  [facility] Primary Care Doctor [facility]  Specialists [facility]  Urgent  Care Non-[facility] Primary Care Doctor   Email your healthcare team securely and privately 24/7 X X X    Manage appointments: schedule your next appointment; view details of past/upcoming appointments X      Request prescription refills. X      View recent personal medical records, including lab and immunizations X X X X   View health record, including health history, allergies, medications X X X X   Read reports about your outpatient visits, procedures, consult and ER notes X X X X   See your discharge summary, which is a recap of your hospital and/or ER visit that includes your diagnosis, lab results, and care plan X X  X     How to register for Skyepackt:  Once your e-mail address has been verified, follow the following steps to sign up for Skyepackt.     1. Go to  [email or web address]  2. Click on the Sign Up Now box, which takes you to the New Member Sign Up page. You will  need to provide the following information:  a. Enter your [employer] Access Code exactly as it appears at the top of this page. (You will not need to use this code after you’ve completed the sign-up process. If you do not sign up before the expiration date, you must request a new code.)   b. Enter your date of birth.   c. Enter your home email address.   d. Click Submit, and follow the next screen’s instructions.  3. Create a Quantum Groupt ID. This will be your CleanScapes login ID and cannot be changed, so think of one that is secure and easy to remember.  4. Create a CleanScapes password. You can change your password at any time.  5. Enter your Password Reset Question and Answer. This can be used at a later time if you forget your password.   6. Enter your e-mail address. This allows you to receive e-mail notifications when new information is available in CleanScapes.  7. Click Sign Up. You can now view your health information.    For assistance activating your CleanScapes account, call [phone]

## 2017-06-01 NOTE — IP AVS SNAPSHOT
6/5/2017    Rebecca Bishop  [address]  [place] [facility] [address]    Dear Rebecca:    [facility] wants to ensure your discharge home is safe and you or your loved ones have had all of your questions answered regarding your care after you leave the hospital.    Below is a list of resources and contact information should you have any questions regarding your hospital stay, follow-up instructions, or active medical symptoms.    Questions or Concerns Regarding… Contact   Medical Questions Related to Your Discharge  (7 days a week, 8am-5pm) Contact a Nurse Care Coordinator   [phone]   Medical Questions Not Related to Your Discharge  (24 hours a day / 7 days a week)  Contact the Nurse Health Line   [phone]    Medications or Discharge Instructions Refer to your discharge packet   or contact your [facility] Primary Care Provider   [phone]   Follow-up Appointment(s) Schedule your appointment via [facility]ital   or contact Scheduling [phone]   Billing Review your statement via Phigital  or contact Billing [phone]   Medical Records Review your records via Phigital   or contact Medical Records [phone]     You may receive a telephone call within two days of discharge. This call is to make certain you understand your discharge instructions and have the opportunity to have any questions answered. You can also easily access your medical information, test results and upcoming appointments via the Phigital free online health management tool. You can learn more and sign up at [employer]/Phigital. For assistance setting up your Phigital account, please call [phone].    Once again, we want to ensure your discharge home is safe and that you have a clear understanding of any next steps in your care. If you have any questions or concerns, please do not hesitate to contact us, we are here for you. Thank you for choosing [facility] for your healthcare needs.    Sincerely,    Your [facility] Healthcare Team

## 2017-06-01 NOTE — H&P
DATE OF ADMISSION:  06/01/2017    CONSULTING PHYSICIAN:  Ricardo Luna MD    PRIMARY CARE PHYSICIAN:  Duane Patel DO    OUTPATIENT CARDIOLOGIST:  She states Dr. Whitaker.  I have never heard of this   name.  I do not see anybody of that name listed in the cardiology group here   in [place], might be a new physician.    CHIEF COMPLAINT:  Right knee gave out, right ankle pain this a.m.    HISTORY OF PRESENT ILLNESS:  The patient is an 82-year-old female.  She was   admitted for concern of right ankle pain after having her right knee give out   this morning while in the bathroom.  States she was standing at the sink, she   turned, felt her right knee give out.  She did not hit her head.  She states   she was able to go down slowly, but when she attempted to get up, she had a   right ankle pain.  Here, she was found to have a trimalleolar ankle   fracture/dislocation, which was reduced in the emergency room.  Dr. Ricardo Luna has seen the patient and has asked internal medicine to admit for other   acuities of care.    The patient is currently awake, alert, conversant, in no distress.  Her   daughter is at bedside.  She does have a splint on her right knee.  The   patient denies any hip pain.  She has not had any knee pain.  She has not had   any dizziness, palpitations.  States her right knee just gave out.  She did   not feel like she was going to pass out during this episode, nor did she hit   her head or have any other traumas other than her fracture to her right ankle.    REVIEW OF SYSTEMS:  No fevers, chills.  She has not been dehydrated, although   she did not eat breakfast prior to this occurring.  She has not had any new or   recent changes to her medications.    All other review of systems unremarkable per CMS/AMA criteria.    ALLERGIES:  None known.    CURRENT OUTPATIENT MEDICATIONS:  1.  Norvasc 10 mg a day.  2.  Aspirin 81 mg a day.  3.  Celexa 20 mg daily.  4.  Crestor 40 mg every  evening.    PAST MEDICAL HISTORY:  1.  Hypertension.  2.  Dyslipidemia.  3.  Depression.  4.  She has had a stroke in the past, no residual deficits, TIA in 2015.    PAST SURGICAL HISTORY:  She does state some right arthroscopic surgeries to   her knee, total abdominal hysterectomy in her 60s.    SOCIAL HISTORY:  She is .  She has had 4 pregnancies, 3 children.  She   is a nonsmoker.  No significant alcohol.    FAMILY HISTORY:  Both parents are .  Father  from heart related   issues.  Mother  from emphysema, I believe.    PHYSICAL EXAMINATION:  VITAL SIGNS:  Temperature was 98.4, heart rate of 94, respirations 16, blood   pressure 125/69, oxygen saturations 95% on room air.  GENERAL:  This is a well-nourished elderly female.  She is awake, alert and   conversant.  Speech is clear and articulate.  HEENT:  NCAT, there is no notice of any bruising or any head wounds.  External   ears are unremarkable.  Nares are patent.  She has moist mucosa.  Fair   dentition.  Oropharynx well visualized.  Mallampati score of about 1.  NECK:  Trachea is midline.  There is no adenopathy, no stridor on   auscultation.  No adenopathy in the cervical, supraclavicular region.  LUNGS:  Clear to auscultation bilaterally.  There are no wheezes, no crackles,   no accessory muscle use.  CARDIOVASCULAR:  Regular rate and rhythm.  No murmurs, gallops or rubs.    Normal S1, S2.  ABDOMEN:  Soft, nontender, nondistended.  Positive bowel sounds.  EXTREMITIES:  No lower extremity edema, 2+ radial artery pulses.  No clubbing   or cyanosis of the digits.  Her right lower leg is in a splint.  She does have   good capillary refill to the distal left toes that are emerging from the   splint.  PSYCHIATRIC:  Normal affect.    LABORATORY DATA:  CBC shows a white count of 12.5, otherwise unremarkable CBC.    Comprehensive metabolic panel shows potassium of 3.5, otherwise   unremarkable.    IMAGING STUDIES:  Two-view of her  ankle x-ray shows fracture dislocation of   the ankle and surrounding soft tissue swelling.  Tibia and fibula x-ray shows   status post closed reduction, comminuted fracture dislocation, right ankle.    EKG showed heart rate of 70, normal sinus rhythm.  KY is prolonged, KY   interval of 232.  There are no significant ST or T-wave abnormalities,   flattening in lead III.    ASSESSMENT AND PLAN:  1.  Right ankle fracture.  Dr. Ricardo Luna to take to the surgery today.    The patient will be closely monitored from medical standpoint thereafter.    Recommend anticoagulation per orthopedic recommendations.  We will hold off   any anticoagulation until after surgery.  Thereafter, I would recommend   heparin subcutaneous or Lovenox subcutaneous to help deep vein thrombosis   prophylaxis if okay with surgeon.  2.  Hypokalemia.  We will give IV potassium supplements as the patient will be   n.p.o. for surgery.  We will check a magnesium level as well.  It is less   than 2, we will give an extra 2 g.  3.  History of hypertension.  We will monitor vital signs.  Continue with   active ongoing treatment with outpatient medications with parameters to hold.    The patient will be n.p.o. except for sips of medications and gentle small   amounts of water.  4.  Leukocytosis, likely stress response.  We will monitor CBC.    She has wished to be a full code as discussed with the patient and her   daughter.       ____________________________________     DO MARICRUZ MCBRIDE / ROXANA    DD:  06/01/2017 11:28:24  DT:  06/01/2017 11:57:26    D#:  [id number]  Job#:  [id number]

## 2017-06-01 NOTE — ED NOTES
"Chief Complaint   Patient presents with   • T-5000 GLF     right ankle, GLF in bathroom. pt denies dizziness. Hx knee problems in leg, reports that leg \"just gave out.\" + deformity, CMS+     Pt to B21.  Blood pressure 125/69, pulse 94, temperature 36.9 °C (98.4 °F), temperature source Temporal, resp. rate 16, height 1.524 m (5'), SpO2 95 %.    "

## 2017-06-01 NOTE — ED NOTES
Pt transported to floor while this RN in another room. Report called to Shannan WYNNE, all questions answered. All belongings with pt, accompanied by family.

## 2017-06-01 NOTE — PROGRESS NOTES
Trimalleolar ankle fracture dislocation, reduced in ER.    Plan:  - Admit to medicine  - NPO  - To OR today for ankle ORIF

## 2017-06-02 PROBLEM — E87.6 HYPOKALEMIA: Status: ACTIVE | Noted: 2017-06-02

## 2017-06-02 LAB
ANION GAP SERPL CALC-SCNC: 7 MMOL/L (ref 0–11.9)
BUN SERPL-MCNC: 9 MG/DL (ref 8–22)
CALCIUM SERPL-MCNC: 8.7 MG/DL (ref 8.5–10.5)
CHLORIDE SERPL-SCNC: 104 MMOL/L (ref 96–112)
CO2 SERPL-SCNC: 25 MMOL/L (ref 20–33)
CREAT SERPL-MCNC: 0.98 MG/DL (ref 0.5–1.4)
ERYTHROCYTE [DISTWIDTH] IN BLOOD BY AUTOMATED COUNT: 47.4 FL (ref 35.9–50)
GFR SERPL CREATININE-BSD FRML MDRD: 54 ML/MIN/1.73 M 2
GLUCOSE SERPL-MCNC: 112 MG/DL (ref 65–99)
HCT VFR BLD AUTO: 28.3 % (ref 37–47)
HGB BLD-MCNC: 9.2 G/DL (ref 12–16)
MCH RBC QN AUTO: 30.1 PG (ref 27–33)
MCHC RBC AUTO-ENTMCNC: 32.5 G/DL (ref 33.6–35)
MCV RBC AUTO: 92.5 FL (ref 81.4–97.8)
PLATELET # BLD AUTO: 288 K/UL (ref 164–446)
PMV BLD AUTO: 9.9 FL (ref 9–12.9)
POTASSIUM SERPL-SCNC: 3.5 MMOL/L (ref 3.6–5.5)
RBC # BLD AUTO: 3.06 M/UL (ref 4.2–5.4)
SODIUM SERPL-SCNC: 136 MMOL/L (ref 135–145)
WBC # BLD AUTO: 9.4 K/UL (ref 4.8–10.8)

## 2017-06-02 PROCEDURE — 700111 HCHG RX REV CODE 636 W/ 250 OVERRIDE (IP): Performed by: ORTHOPAEDIC SURGERY

## 2017-06-02 PROCEDURE — 97161 PT EVAL LOW COMPLEX 20 MIN: CPT

## 2017-06-02 PROCEDURE — 97165 OT EVAL LOW COMPLEX 30 MIN: CPT

## 2017-06-02 PROCEDURE — 700102 HCHG RX REV CODE 250 W/ 637 OVERRIDE(OP): Performed by: HOSPITALIST

## 2017-06-02 PROCEDURE — G8978 MOBILITY CURRENT STATUS: HCPCS | Mod: CJ

## 2017-06-02 PROCEDURE — 36415 COLL VENOUS BLD VENIPUNCTURE: CPT

## 2017-06-02 PROCEDURE — G8987 SELF CARE CURRENT STATUS: HCPCS | Mod: CJ

## 2017-06-02 PROCEDURE — G0378 HOSPITAL OBSERVATION PER HR: HCPCS

## 2017-06-02 PROCEDURE — 80048 BASIC METABOLIC PNL TOTAL CA: CPT

## 2017-06-02 PROCEDURE — 99226 PR SUBSEQUENT OBSERVATION CARE,LEVEL III: CPT | Performed by: INTERNAL MEDICINE

## 2017-06-02 PROCEDURE — G8989 SELF CARE D/C STATUS: HCPCS | Mod: CJ

## 2017-06-02 PROCEDURE — G8988 SELF CARE GOAL STATUS: HCPCS | Mod: CJ

## 2017-06-02 PROCEDURE — A9270 NON-COVERED ITEM OR SERVICE: HCPCS | Performed by: HOSPITALIST

## 2017-06-02 PROCEDURE — G8979 MOBILITY GOAL STATUS: HCPCS | Mod: CI

## 2017-06-02 PROCEDURE — 85027 COMPLETE CBC AUTOMATED: CPT

## 2017-06-02 PROCEDURE — 700111 HCHG RX REV CODE 636 W/ 250 OVERRIDE (IP): Performed by: INTERNAL MEDICINE

## 2017-06-02 RX ADMIN — ENOXAPARIN SODIUM 30 MG: 100 INJECTION SUBCUTANEOUS at 17:27

## 2017-06-02 RX ADMIN — AMLODIPINE BESYLATE 10 MG: 10 TABLET ORAL at 09:52

## 2017-06-02 RX ADMIN — DEXAMETHASONE SODIUM PHOSPHATE 10 MG: 4 INJECTION, SOLUTION INTRAMUSCULAR; INTRAVENOUS at 05:21

## 2017-06-02 RX ADMIN — OXYCODONE HYDROCHLORIDE 5 MG: 5 TABLET ORAL at 20:48

## 2017-06-02 RX ADMIN — TRAMADOL HYDROCHLORIDE 50 MG: 50 TABLET, COATED ORAL at 01:34

## 2017-06-02 RX ADMIN — ROSUVASTATIN CALCIUM 40 MG: 20 TABLET ORAL at 20:49

## 2017-06-02 RX ADMIN — CITALOPRAM HYDROBROMIDE 20 MG: 20 TABLET ORAL at 09:52

## 2017-06-02 RX ADMIN — OXYCODONE HYDROCHLORIDE 5 MG: 5 TABLET ORAL at 00:36

## 2017-06-02 RX ADMIN — ASPIRIN 81 MG: 81 TABLET ORAL at 09:52

## 2017-06-02 ASSESSMENT — COGNITIVE AND FUNCTIONAL STATUS - GENERAL
HELP NEEDED FOR BATHING: A LITTLE
WALKING IN HOSPITAL ROOM: A LITTLE
SUGGESTED CMS G CODE MODIFIER MOBILITY: CJ
DAILY ACTIVITIY SCORE: 21
MOBILITY SCORE: 21
TOILETING: A LITTLE
DRESSING REGULAR LOWER BODY CLOTHING: A LITTLE
CLIMB 3 TO 5 STEPS WITH RAILING: A LOT
SUGGESTED CMS G CODE MODIFIER DAILY ACTIVITY: CJ

## 2017-06-02 ASSESSMENT — ENCOUNTER SYMPTOMS
MYALGIAS: 0
CONSTIPATION: 1
BACK PAIN: 0
VOMITING: 0
SPEECH CHANGE: 0
FEVER: 0
SHORTNESS OF BREATH: 0
FLANK PAIN: 0
DIZZINESS: 0
SENSORY CHANGE: 0
ABDOMINAL PAIN: 0
MEMORY LOSS: 0
HEADACHES: 0
NAUSEA: 0
CHILLS: 0
COUGH: 0
DEPRESSION: 0
BLURRED VISION: 1
FOCAL WEAKNESS: 0
PALPITATIONS: 0
NERVOUS/ANXIOUS: 0
WEAKNESS: 0

## 2017-06-02 ASSESSMENT — PAIN SCALES - GENERAL
PAINLEVEL_OUTOF10: 0
PAINLEVEL_OUTOF10: 2
PAINLEVEL_OUTOF10: 5
PAINLEVEL_OUTOF10: 8

## 2017-06-02 ASSESSMENT — PATIENT HEALTH QUESTIONNAIRE - PHQ9
SUM OF ALL RESPONSES TO PHQ9 QUESTIONS 1 AND 2: 0
1. LITTLE INTEREST OR PLEASURE IN DOING THINGS: NOT AT ALL
SUM OF ALL RESPONSES TO PHQ9 QUESTIONS 1 AND 2: 0
1. LITTLE INTEREST OR PLEASURE IN DOING THINGS: NOT AT ALL
SUM OF ALL RESPONSES TO PHQ QUESTIONS 1-9: 0
2. FEELING DOWN, DEPRESSED, IRRITABLE, OR HOPELESS: NOT AT ALL
SUM OF ALL RESPONSES TO PHQ QUESTIONS 1-9: 0

## 2017-06-02 ASSESSMENT — GAIT ASSESSMENTS
ASSISTIVE DEVICE: FRONT WHEEL WALKER
GAIT LEVEL OF ASSIST: STAND BY ASSIST
DISTANCE (FEET): 20

## 2017-06-02 ASSESSMENT — ACTIVITIES OF DAILY LIVING (ADL): TOILETING: INDEPENDENT

## 2017-06-02 NOTE — DISCHARGE PLANNING
Aware of PMR referral from Dr. Montero. Current chart documentation - unilateral ankle fracture - does not meet CMS criteria for IRF level care. Wll not forward to Physiatry for consult at this time.  Thank you for the opportunity to assist as this individual prepares to transition to post acute care.

## 2017-06-02 NOTE — ASSESSMENT & PLAN NOTE
S/p ORIF, 6/1  Ortho Dr. Luna  Pt/ot  Pain control    - possible home with home health in 1-2 days

## 2017-06-02 NOTE — CARE PLAN
Problem: Safety  Goal: Will remain free from injury  Intervention: Provide assistance with mobility  Pt reports understanding to use the call bell for help

## 2017-06-02 NOTE — CARE PLAN
Problem: Pain Management  Goal: Pain level will decrease to patient’s comfort goal  Intervention: Follow pain managment plan developed in collaboration with patient and Interdisciplinary Team  Pain reported at 5, tolerable, increased to 7-8 and medicated per MAR.

## 2017-06-02 NOTE — THERAPY
"Occupational Therapy Evaluation completed.   Functional Status:  Pt s/p R ankle ORIF, TTWB x2tumlu, post education/training on wb precaution's during ADL functional mobility able to demonstrate back adequately, toileting supervision, LB dressing sba/min a, UB supervision, h/g seated MI. Discussed at length with dtr and pt, HHS vs rehab, AE, and compensatory techniques during ADLs. Pt would need BSC for nighttime use secondary to distance pt needs to complete, has access to w/c and SC, daughter will provide 24/7 supervision/assist as needed. Pt would benefit from  OT services to progress pt in safe and graded mannner with higher level ADLs/IADL's as wb precautions progress. Does not present the need for acute skilled services at this time.    Plan of Care: Patient with no further skilled OT needs in the acute care setting at this time  Discharge Recommendations:  Equipment: Front-Wheel Walker and Bedside Commode. Post-acute therapy Discharge to home with outpatient or home health for additional skilled therapy services.    See \"Rehab Therapy-Acute\" Patient Summary Report for complete documentation.    "

## 2017-06-02 NOTE — OP REPORT
DATE OF SERVICE:  06/01/2017    PREOPERATIVE DIAGNOSIS:  Right trimalleolar ankle fracture.    POSTOPERATIVE DIAGNOSIS:  Right trimalleolar ankle fracture    PROCEDURES PERFORMED:  1.  Open reduction and internal fixation of medial and lateral malleoli of   trimalleolar ankle fracture.  2.  Open reduction and internal fixation of right ankle syndesmosis.    SURGEON:  Ricardo Luna MD.    ASSISTANT:  Tad Prakash MD.    ANESTHESIOLOGIST:  Sailaja Strong MD.    ANESTHESIA TYPE:  General.    SPECIMENS:  None.    ESTIMATED BLOOD LOSS:  Minimal.    COMPLICATIONS:  None.    TOURNIQUET TIME:  45 minutes at 250 mmHg.    OPERATIVE INDICATIONS:  The patient is a pleasant 82-year-old female who   sustained a ground-level fall and had subsequent right ankle trimalleolar   fracture dislocation.  She was reduced in the emergency department and   splinted.  She has a normal neurovascular exam and normal skin envelope.    Radiographs demonstrated a displaced trimalleolar ankle fracture with a very   small posterior lip.  Given these findings, she is appropriately indicated as   a candidate for an operative reduction and internal fixation of her   trimalleolar ankle fracture.  I discussed the risks, benefits, alternatives   including risk of infection, wound healing complication, neurovascular injury,   blood loss, DVT, PE, malunion, nonunion, stiffness, plate prominence, as well   as medical risks of anesthesia including MI, stroke, and death.  We discussed   the alternatives of the nonoperative management.  Discussed benefits   including improved chance of union and acceptable alignment.  She is in   agreement with plan to proceed.  Informed consent was signed and documented in   the medical record.  I met with her preoperatively and marked the operative   extremity with her agreement.  We proceeded to the operating room at [facility].    OPERATIVE COURSE:  She underwent regional general anesthesia by Dr. Strong   and  positioned supine with bump to the right buttock and a ramp under the   right leg.  Right lower extremity was prepped and draped in sterile orthopedic   fashion after a nonsterile tourniquet was applied to the right thigh.  The   surgical team scrubbed in.  Procedural pause was conducted to verify correct   patient, correct extremity, presence of the surgeon's initials on the   operative extremity, and administration of IV antibiotics in this case Ancef.    Following generalized agreement, a curvilinear incision in the anterior   distal aspect of the medial malleolus was made incising the skin and   subcutaneous tissue sharply.  We dissected down to the fracture site, which   was identified.  Her bone was found to be of quite poor quality and so   placement of the clamp was not possible.  We then reduced the fracture   manually and secured with 2 guidewires with [facility] 4.0 mm cannulated screw   system.  We then placed one 46 mm 4.0 mm cannulated screw.  This achieved   moderate purchase.  Given the lack of good purchase, I then placed a 3.5   mm cortical nonlocking screw.  We then drilled over the pin using a 2.7 mm   drill.  The pin was then removed.  A 3.5 mm cortical nonlocking screw was   placed in retrograde manner from the tip of the medial malleolus engaging the   cortex of the tibia.  When we were satisfied, we turned our attention to the   fibula.  A direct lateral approach to the fibula was performed incising the   skin and subcutaneous tissue sharply.  We looked for the superficial peroneal   nerve, but this was not identified.  I then dissected down the   fracture site, which was clamped using lobster claw reduction forceps.  This   was then secured with a K-wire, which was removed after the application of the   plate and one 2.5 mm bicortical nonlocking screw, which achieved minimal   purchase.  I then selected a 7-hole [facility] lateral fibular locking plate in   position within the wound.  We then secured this  to the bone proximally with a   combination of locking and nonlocking 3.5 mm screws and distally with several   2.5 mm locking unicortical screws.  During the procedure, it was observed   that her anterior syndesmotic ligaments were injured.  Given this finding, we   elected to place a syndesmotic screw and the syndesmosis was found to be   reduced and we then drilled for placement of a 3.5 mm cortical nonlocking   screw across all 4 cortices.  This achieved good purchase.  After that was   complete, final fluoroscopic images were obtained demonstrating appropriate   reduction of the fracture and good position of the hardware.  There is no   posterior instability noted.  We then placed sterile dressings and a posterior   splint with stirrups and transferred the patient to the recover room in   stable condition and suffered no complications.    The assistant, Tad Prakash MD as the surgical assistant was necessary for   assistance with fracture reduction, exposure, hardware placement and closure   as well as splint application.    POSTOPERATIVE PLAN:  1.  Toe touch weightbearing to the right lower extremity x6 weeks, keep the   splint clean, dry and in place until the follow visit.  2.  Deep venous thrombosis prophylaxis with sequential compression devices,   chemical per medicine none required per ortho.  3.  IV antibiotic prophylaxis-none.  4.  Discharge planning.       ____________________________________     MD MOIZ Park / ROXANA    DD:  06/01/2017 16:40:00  DT:  06/01/2017 19:42:54    D#:  [id number]  Job#:  [id number]

## 2017-06-02 NOTE — PROGRESS NOTES
Pt a&o. VSS, b/p soft MD aware. Asymptomatic. Pain tolerable @5 but when increase 5mg oxy and tramadol given with effect. Pt getting up to the commode and voiding. Call bell in reach

## 2017-06-02 NOTE — PROGRESS NOTES
[facility] Hospitalist Progress Note    Date of Service: 2017    Chief Complaint  82 y.o. female admitted 2017 s/p fall with R knee fx s/p ORIF    Interval Problem Update  Denies pain, tolerating PT  dtr at bedside  Pleasant, sweet, motivated    Consultants/Specialty  Ortho/walker    Disposition  Rehab referral  Pt/ot        Review of Systems   Constitutional: Negative for fever, chills and malaise/fatigue.   HENT: Negative for congestion and hearing loss.    Eyes: Positive for blurred vision.   Respiratory: Negative for cough and shortness of breath.    Cardiovascular: Negative for chest pain, palpitations and leg swelling.   Gastrointestinal: Positive for constipation. Negative for nausea, vomiting and abdominal pain.   Genitourinary: Negative for dysuria and flank pain.   Musculoskeletal: Positive for joint pain. Negative for myalgias and back pain.   Neurological: Negative for dizziness, sensory change, speech change, focal weakness, weakness and headaches.   Psychiatric/Behavioral: Negative for depression and memory loss. The patient is not nervous/anxious.       Physical Exam  Laboratory/Imaging   Hemodynamics  Temp (24hrs), Av.7 °C (98.1 °F), Min:36.2 °C (97.2 °F), Max:37.1 °C (98.8 °F)   Temperature: 36.8 °C (98.2 °F)  Pulse  Av.1  Min: 71  Max: 95 Heart Rate (Monitored): 83  Blood Pressure : 110/66 mmHg, NIBP: (!) 91/51 mmHg      Respiratory      Respiration: 17, Pulse Oximetry: 97 %     Work Of Breathing / Effort:  (SOB with excertion )       Fluids    Intake/Output Summary (Last 24 hours) at 17 0931  Last data filed at 17 1700   Gross per 24 hour   Intake    800 ml   Output     10 ml   Net    790 ml       Nutrition  Orders Placed This Encounter   Procedures   • DIET ORDER     Standing Status: Standing      Number of Occurrences: 1      Standing Expiration Date:      Order Specific Question:  Diet:     Answer:  Regular [1]     Physical Exam   Constitutional: She is oriented to  person, place, and time. She appears well-nourished. No distress.   HENT:   Head: Normocephalic and atraumatic.   Nose: Nose normal.   Eyes: EOM are normal. Pupils are equal, round, and reactive to light. Right eye exhibits no discharge. Left eye exhibits no discharge.   Neck: Neck supple. No JVD present. No thyromegaly present.   Cardiovascular: Normal rate and intact distal pulses.    No murmur heard.  Pulmonary/Chest: Breath sounds normal. No respiratory distress. She has no wheezes.   Abdominal: Soft. Bowel sounds are normal. She exhibits no distension. There is no tenderness.   Musculoskeletal: She exhibits no edema or tenderness.   + RLE dressing, c/d/i   Neurological: She is alert and oriented to person, place, and time. No cranial nerve deficit.   Skin: Skin is warm and dry. No rash noted. She is not diaphoretic. No erythema.   Psychiatric: She has a normal mood and affect. Her behavior is normal. Judgment and thought content normal.   Nursing note and vitals reviewed.      Recent Labs      06/01/17   0810  06/02/17   0427   WBC  12.5*  9.4   RBC  4.06*  3.06*   HEMOGLOBIN  12.2  9.2*   HEMATOCRIT  36.8*  28.3*   MCV  90.6  92.5   MCH  30.0  30.1   MCHC  33.2*  32.5*   RDW  45.7  47.4   PLATELETCT  382  288   MPV  10.3  9.9     Recent Labs      06/01/17   0810  06/02/17   0427   SODIUM  140  136   POTASSIUM  3.5*  3.5*   CHLORIDE  105  104   CO2  23  25   GLUCOSE  124*  112*   BUN  12  9   CREATININE  0.76  0.98   CALCIUM  9.6  8.7     Recent Labs      06/01/17   0810   APTT  27.8   INR  0.95                  Assessment/Plan     * Ankle fracture, right  Assessment & Plan  S/p ORIF, 6/1  Ortho Dr. Luna  Pt/ot  Pain control  snf vs rehab    Leukocytosis (present on admission)  Assessment & Plan  Resolved  APR    HTN (present on admission)  Assessment & Plan  controlled    History of CVA (present on admission)  Assessment & Plan  Pt/ot    Normocytic anemia (present on admission)  Assessment &  Plan  stable    Hypokalemia  Assessment & Plan  Cont supplements    Labs reviewed, Medications reviewed and Radiology images reviewed

## 2017-06-02 NOTE — THERAPY
"Physical Therapy Evaluation completed.   Bed Mobility:  Supine to Sit: Supervised  Transfers: Sit to Stand: Stand by Assist  Gait: Level Of Assist: Stand by Assist with Front-Wheel Walker       Plan of Care: PT will be available for questions.  Discharge Recommendations: Equipment: Front-Wheel Walker and Bedside Commode. Post-acute therapy Discharge to home with outpatient or home health for additional skilled therapy services.    Pt presents with minimal deficits following R ankle fx. Had good demo with TTWB R LE with reliance on FWW for support. PT had long discussion with pt and pt's daughter regarding DC options. At this time, it would appear that pt could succeed at daughter's home with HHPT services and adequate DME including wc (which pt has access to), FWW, and bedside commode, as nearest bathroom is on other side of home and pt reports she must get up several times a night to utilize bathroom. PT will be available for questions should they arise.     See \"Rehab Therapy-Acute\" Patient Summary Report for complete documentation.     "

## 2017-06-03 ENCOUNTER — APPOINTMENT (OUTPATIENT)
Dept: RADIOLOGY | Facility: MEDICAL CENTER | Age: 82
DRG: 493 | End: 2017-06-03
Attending: NURSE PRACTITIONER
Payer: MEDICARE

## 2017-06-03 PROBLEM — N39.0 UTI (URINARY TRACT INFECTION): Status: ACTIVE | Noted: 2017-06-03

## 2017-06-03 LAB
ALBUMIN SERPL BCP-MCNC: 4.1 G/DL (ref 3.2–4.9)
ALBUMIN/GLOB SERPL: 1.3 G/DL
ALP SERPL-CCNC: 79 U/L (ref 30–99)
ALT SERPL-CCNC: 11 U/L (ref 2–50)
ANION GAP SERPL CALC-SCNC: 14 MMOL/L (ref 0–11.9)
APPEARANCE UR: ABNORMAL
AST SERPL-CCNC: 21 U/L (ref 12–45)
BACTERIA #/AREA URNS HPF: ABNORMAL /HPF
BASOPHILS # BLD AUTO: 0.1 % (ref 0–1.8)
BASOPHILS # BLD: 0.02 K/UL (ref 0–0.12)
BILIRUB SERPL-MCNC: 0.3 MG/DL (ref 0.1–1.5)
BILIRUB UR QL STRIP.AUTO: NEGATIVE
BUN SERPL-MCNC: 22 MG/DL (ref 8–22)
CALCIUM SERPL-MCNC: 9.2 MG/DL (ref 8.5–10.5)
CHLORIDE SERPL-SCNC: 104 MMOL/L (ref 96–112)
CO2 SERPL-SCNC: 21 MMOL/L (ref 20–33)
COLOR UR: YELLOW
CREAT SERPL-MCNC: 1.16 MG/DL (ref 0.5–1.4)
EOSINOPHIL # BLD AUTO: 0 K/UL (ref 0–0.51)
EOSINOPHIL NFR BLD: 0 % (ref 0–6.9)
EPI CELLS #/AREA URNS HPF: ABNORMAL /HPF
ERYTHROCYTE [DISTWIDTH] IN BLOOD BY AUTOMATED COUNT: 46.5 FL (ref 35.9–50)
GFR SERPL CREATININE-BSD FRML MDRD: 45 ML/MIN/1.73 M 2
GLOBULIN SER CALC-MCNC: 3.1 G/DL (ref 1.9–3.5)
GLUCOSE SERPL-MCNC: 165 MG/DL (ref 65–99)
GLUCOSE UR STRIP.AUTO-MCNC: NEGATIVE MG/DL
GRAN CASTS #/AREA URNS LPF: ABNORMAL /LPF
HCT VFR BLD AUTO: 30.3 % (ref 37–47)
HGB BLD-MCNC: 9.7 G/DL (ref 12–16)
IMM GRANULOCYTES # BLD AUTO: 0.11 K/UL (ref 0–0.11)
IMM GRANULOCYTES NFR BLD AUTO: 0.6 % (ref 0–0.9)
KETONES UR STRIP.AUTO-MCNC: NEGATIVE MG/DL
LEUKOCYTE ESTERASE UR QL STRIP.AUTO: ABNORMAL
LYMPHOCYTES # BLD AUTO: 0.91 K/UL (ref 1–4.8)
LYMPHOCYTES NFR BLD: 5.4 % (ref 22–41)
MCH RBC QN AUTO: 29.8 PG (ref 27–33)
MCHC RBC AUTO-ENTMCNC: 32 G/DL (ref 33.6–35)
MCV RBC AUTO: 93.2 FL (ref 81.4–97.8)
MICRO URNS: ABNORMAL
MONOCYTES # BLD AUTO: 1.97 K/UL (ref 0–0.85)
MONOCYTES NFR BLD AUTO: 11.6 % (ref 0–13.4)
MUCOUS THREADS #/AREA URNS HPF: ABNORMAL /HPF
NEUTROPHILS # BLD AUTO: 13.93 K/UL (ref 2–7.15)
NEUTROPHILS NFR BLD: 82.3 % (ref 44–72)
NITRITE UR QL STRIP.AUTO: NEGATIVE
NRBC # BLD AUTO: 0 K/UL
NRBC BLD AUTO-RTO: 0 /100 WBC
PH UR STRIP.AUTO: 5.5 [PH]
PLATELET # BLD AUTO: 332 K/UL (ref 164–446)
PMV BLD AUTO: 10.2 FL (ref 9–12.9)
POTASSIUM SERPL-SCNC: 3.6 MMOL/L (ref 3.6–5.5)
PROT SERPL-MCNC: 7.2 G/DL (ref 6–8.2)
PROT UR QL STRIP: 100 MG/DL
RBC # BLD AUTO: 3.25 M/UL (ref 4.2–5.4)
RBC # URNS HPF: ABNORMAL /HPF
RBC UR QL AUTO: ABNORMAL
SODIUM SERPL-SCNC: 139 MMOL/L (ref 135–145)
SP GR UR STRIP.AUTO: 1.02
WBC # BLD AUTO: 16.9 K/UL (ref 4.8–10.8)
WBC #/AREA URNS HPF: ABNORMAL /HPF

## 2017-06-03 PROCEDURE — G0378 HOSPITAL OBSERVATION PER HR: HCPCS

## 2017-06-03 PROCEDURE — 700111 HCHG RX REV CODE 636 W/ 250 OVERRIDE (IP): Performed by: INTERNAL MEDICINE

## 2017-06-03 PROCEDURE — 700105 HCHG RX REV CODE 258

## 2017-06-03 PROCEDURE — 87086 URINE CULTURE/COLONY COUNT: CPT

## 2017-06-03 PROCEDURE — 85025 COMPLETE CBC W/AUTO DIFF WBC: CPT

## 2017-06-03 PROCEDURE — A9270 NON-COVERED ITEM OR SERVICE: HCPCS | Performed by: HOSPITALIST

## 2017-06-03 PROCEDURE — 306557 VEST RESTRAINT (SMALL): Performed by: NURSE PRACTITIONER

## 2017-06-03 PROCEDURE — 700105 HCHG RX REV CODE 258: Performed by: INTERNAL MEDICINE

## 2017-06-03 PROCEDURE — 71010 DX-CHEST-PORTABLE (1 VIEW): CPT

## 2017-06-03 PROCEDURE — 99226 PR SUBSEQUENT OBSERVATION CARE,LEVEL III: CPT | Performed by: INTERNAL MEDICINE

## 2017-06-03 PROCEDURE — 80053 COMPREHEN METABOLIC PANEL: CPT

## 2017-06-03 PROCEDURE — 81001 URINALYSIS AUTO W/SCOPE: CPT

## 2017-06-03 PROCEDURE — 700102 HCHG RX REV CODE 250 W/ 637 OVERRIDE(OP): Performed by: HOSPITALIST

## 2017-06-03 PROCEDURE — 36415 COLL VENOUS BLD VENIPUNCTURE: CPT

## 2017-06-03 RX ORDER — SODIUM CHLORIDE 9 MG/ML
INJECTION, SOLUTION INTRAVENOUS
Status: COMPLETED
Start: 2017-06-03 | End: 2017-06-03

## 2017-06-03 RX ADMIN — ROSUVASTATIN CALCIUM 40 MG: 20 TABLET ORAL at 20:03

## 2017-06-03 RX ADMIN — CEFTRIAXONE SODIUM 2 G: 2 INJECTION, POWDER, FOR SOLUTION INTRAMUSCULAR; INTRAVENOUS at 11:31

## 2017-06-03 RX ADMIN — AMLODIPINE BESYLATE 10 MG: 10 TABLET ORAL at 09:29

## 2017-06-03 RX ADMIN — CITALOPRAM HYDROBROMIDE 20 MG: 20 TABLET ORAL at 09:30

## 2017-06-03 RX ADMIN — ASPIRIN 81 MG: 81 TABLET ORAL at 09:30

## 2017-06-03 RX ADMIN — SODIUM CHLORIDE: 9 INJECTION, SOLUTION INTRAVENOUS at 11:45

## 2017-06-03 ASSESSMENT — ENCOUNTER SYMPTOMS
MYALGIAS: 0
FLANK PAIN: 0
SHORTNESS OF BREATH: 0
FEVER: 0
MEMORY LOSS: 0
WEAKNESS: 0
ABDOMINAL PAIN: 0
SENSORY CHANGE: 0
HEADACHES: 0
NERVOUS/ANXIOUS: 0
CONSTIPATION: 1
BLURRED VISION: 1
PALPITATIONS: 0
CHILLS: 0
FOCAL WEAKNESS: 0
NAUSEA: 0

## 2017-06-03 ASSESSMENT — PAIN SCALES - GENERAL
PAINLEVEL_OUTOF10: 0
PAINLEVEL_OUTOF10: 0

## 2017-06-03 NOTE — PROGRESS NOTES
S:  Seen and examined.  POD #1 s/p R ankle ORIF.  Doing well this PM.  No new complaints, pain controlled.    O: Blood pressure 105/62, pulse 77, temperature 36.6 °C (97.9 °F), temperature source Temporal, resp. rate 17, height 1.524 m (5'), weight 45.36 kg (100 lb), SpO2 91 %..  No intake or output data in the 24 hours ending 06/02/17 1727.    Operative/injured extremity examined.  Distal light touch sensation intact, firing EHL/TA/GS/P.  Toes warm, well-perfused.  Splint c/d/i    Recent Labs      06/01/17   0810  06/02/17   0427   WBC  12.5*  9.4   RBC  4.06*  3.06*   HEMOGLOBIN  12.2  9.2*   HEMATOCRIT  36.8*  28.3*   MCV  90.6  92.5   MCH  30.0  30.1   MCHC  33.2*  32.5*   RDW  45.7  47.4   PLATELETCT  382  288   MPV  10.3  9.9       A/P:    POD #1 s/p R ankle ORIF    Antibiotics: None required  Activity: TTWB operative extremity.  PT today.  Diet: General  DVT: Mechanical (SCDs) + Pharmacologic (per Medicine, none required for ortho)  Dispo: D/C planning

## 2017-06-03 NOTE — CARE PLAN
Problem: Safety  Goal: Will remain free from injury  Outcome: PROGRESSING AS EXPECTED  Goal: Will remain free from falls  Outcome: PROGRESSING AS EXPECTED    Problem: Pain Management  Goal: Pain level will decrease to patient’s comfort goal  Outcome: PROGRESSING AS EXPECTED

## 2017-06-03 NOTE — PROGRESS NOTES
[facility]    Date of Service: 6/3/2017    Chief Complaint  82 y.o. female admitted 2017 s/p fall with R knee fx s/p ORIF    Interval Problem Update  - oriented x 3, denies pain  - + dysuria,   - confusion this am now resolved    Consultants/Specialty  Ortho/walker    Disposition  Rehab referral  Pt/ot        Review of Systems   Constitutional: Negative for fever and chills.   HENT: Negative for congestion and hearing loss.    Eyes: Positive for blurred vision.   Respiratory: Negative for shortness of breath.    Cardiovascular: Negative for palpitations and leg swelling.   Gastrointestinal: Positive for constipation. Negative for nausea and abdominal pain.   Genitourinary: Positive for dysuria. Negative for flank pain.   Musculoskeletal: Positive for joint pain. Negative for myalgias.   Neurological: Negative for sensory change, focal weakness, weakness and headaches.   Psychiatric/Behavioral: Negative for memory loss. The patient is not nervous/anxious.       Physical Exam  Laboratory/Imaging   Hemodynamics  Temp (24hrs), Av.4 °C (86.7 °F), Min:2.5 °C (36.5 °F), Max:37.8 °C (100.1 °F)   Temperature: (!) 2.5 °C (36.5 °F)  Pulse  Av.1  Min: 71  Max: 97    Blood Pressure : 126/67 mmHg      Respiratory      Respiration: 16, Pulse Oximetry: 93 %     Work Of Breathing / Effort: Mild       Fluids  No intake or output data in the 24 hours ending 17 1604    Nutrition  Orders Placed This Encounter   Procedures   • DIET ORDER     Standing Status: Standing      Number of Occurrences: 1      Standing Expiration Date:      Order Specific Question:  Diet:     Answer:  Regular [1]     Physical Exam   Constitutional: She is oriented to person, place, and time. She appears well-nourished. No distress.   HENT:   Head: Normocephalic and atraumatic.   Eyes: EOM are normal. Pupils are equal, round, and reactive to light.   Neck: Normal range of motion. Neck supple. No thyromegaly present.    Cardiovascular: Normal rate and intact distal pulses.    Pulmonary/Chest: Effort normal and breath sounds normal. No respiratory distress.   Abdominal: Soft. Bowel sounds are normal. She exhibits no distension. There is no tenderness.   Musculoskeletal: She exhibits no edema.   + RLE dressing, c/d/i   Neurological: She is alert and oriented to person, place, and time. No cranial nerve deficit. She exhibits normal muscle tone. Coordination normal.   Skin: Skin is warm and dry.   Psychiatric: She has a normal mood and affect. Her behavior is normal. Judgment and thought content normal.   Nursing note and vitals reviewed.      Recent Labs      06/01/17   0810  06/02/17   0427  06/03/17   0245   WBC  12.5*  9.4  16.9*   RBC  4.06*  3.06*  3.25*   HEMOGLOBIN  12.2  9.2*  9.7*   HEMATOCRIT  36.8*  28.3*  30.3*   MCV  90.6  92.5  93.2   MCH  30.0  30.1  29.8   MCHC  33.2*  32.5*  32.0*   RDW  45.7  47.4  46.5   PLATELETCT  382  288  332   MPV  10.3  9.9  10.2     Recent Labs      06/01/17   0810  06/02/17   0427  06/03/17   0245   SODIUM  140  136  139   POTASSIUM  3.5*  3.5*  3.6   CHLORIDE  105  104  104   CO2  23  25  21   GLUCOSE  124*  112*  165*   BUN  12  9  22   CREATININE  0.76  0.98  1.16   CALCIUM  9.6  8.7  9.2     Recent Labs      06/01/17   0810   APTT  27.8   INR  0.95                  Assessment/Plan     * Ankle fracture, right  Assessment & Plan  S/p ORIF, 6/1  Ortho Dr. Luna  Pt/ot  Pain control    - possible home with home health in 2-3 days    Leukocytosis (present on admission)  Assessment & Plan  Resolved  APR    HTN (present on admission)  Assessment & Plan  controlled    History of CVA (present on admission)  Assessment & Plan  Pt/ot    Normocytic anemia (present on admission)  Assessment & Plan  stable    Hypokalemia  Assessment & Plan  Cont supplements    UTI (urinary tract infection)  Assessment & Plan  With leukocytosis and ams- improving  + component of sundowning  F/u cx  Start  rocephin      Labs reviewed, Medications reviewed and Radiology images reviewed

## 2017-06-03 NOTE — CARE PLAN
Problem: Safety  Goal: Will remain free from injury  Outcome: PROGRESSING AS EXPECTED  Pt became confused last night. Bed alarm on. Treaded socks on. Reorientation. Bed in low position. Upper bedrails up.Call light within reach.     Problem: Venous Thromboembolism (VTW)/Deep Vein Thrombosis (DVT) Prevention:  Goal: Patient will participate in Venous Thrombosis (VTE)/Deep Vein Thrombosis (DVT)Prevention Measures  Outcome: PROGRESSING AS EXPECTED  SCDs on. Ambulates to the bathroom standby assist.

## 2017-06-03 NOTE — PROGRESS NOTES
"Pt was complaining of pain on ankle and difficulty sleeping. Oxy 5 was given since she has had oxy 5 the other night with no report given to this RN of any issues. Around midnight, patient became confused, thinking she was at her apartment and wanted to get up and make coffee. She kept saying how \"You need to be careful of monsters out there.\" to staff. Pt constantly challenging bed alarm and was not following wt bearing restrictions on ankle. Pt was re-oriented each time, but pt kept getting pset that she is being followed around to ensure safety. This RN is currently sitting with the patient to make sure she does not fall.  "

## 2017-06-03 NOTE — ASSESSMENT & PLAN NOTE
With leukocytosis and ams- improving  + component of sundowning  U. cx neg  Cont rocephin for full day course

## 2017-06-03 NOTE — PROGRESS NOTES
"Pt became agitated, hollering for help when tried to assist her to the bathroom. She was trying to run away from staff, calling everyone \"evil.\" Updated hospitalist on call. Labs, UA, and chest xray ordered.  "

## 2017-06-04 LAB
ANION GAP SERPL CALC-SCNC: 6 MMOL/L (ref 0–11.9)
BASOPHILS # BLD AUTO: 0.5 % (ref 0–1.8)
BASOPHILS # BLD: 0.07 K/UL (ref 0–0.12)
BUN SERPL-MCNC: 16 MG/DL (ref 8–22)
CALCIUM SERPL-MCNC: 8.4 MG/DL (ref 8.5–10.5)
CHLORIDE SERPL-SCNC: 107 MMOL/L (ref 96–112)
CO2 SERPL-SCNC: 25 MMOL/L (ref 20–33)
CREAT SERPL-MCNC: 0.88 MG/DL (ref 0.5–1.4)
EOSINOPHIL # BLD AUTO: 0.07 K/UL (ref 0–0.51)
EOSINOPHIL NFR BLD: 0.5 % (ref 0–6.9)
ERYTHROCYTE [DISTWIDTH] IN BLOOD BY AUTOMATED COUNT: 46.7 FL (ref 35.9–50)
GFR SERPL CREATININE-BSD FRML MDRD: >60 ML/MIN/1.73 M 2
GLUCOSE SERPL-MCNC: 100 MG/DL (ref 65–99)
HCT VFR BLD AUTO: 26.7 % (ref 37–47)
HGB BLD-MCNC: 8.6 G/DL (ref 12–16)
IMM GRANULOCYTES # BLD AUTO: 0.1 K/UL (ref 0–0.11)
IMM GRANULOCYTES NFR BLD AUTO: 0.7 % (ref 0–0.9)
LYMPHOCYTES # BLD AUTO: 3.15 K/UL (ref 1–4.8)
LYMPHOCYTES NFR BLD: 21.5 % (ref 22–41)
MCH RBC QN AUTO: 29.7 PG (ref 27–33)
MCHC RBC AUTO-ENTMCNC: 32.2 G/DL (ref 33.6–35)
MCV RBC AUTO: 92.1 FL (ref 81.4–97.8)
MONOCYTES # BLD AUTO: 1.62 K/UL (ref 0–0.85)
MONOCYTES NFR BLD AUTO: 11.1 % (ref 0–13.4)
NEUTROPHILS # BLD AUTO: 9.62 K/UL (ref 2–7.15)
NEUTROPHILS NFR BLD: 65.7 % (ref 44–72)
NRBC # BLD AUTO: 0 K/UL
NRBC BLD AUTO-RTO: 0 /100 WBC
PLATELET # BLD AUTO: 291 K/UL (ref 164–446)
PMV BLD AUTO: 10 FL (ref 9–12.9)
POTASSIUM SERPL-SCNC: 3.5 MMOL/L (ref 3.6–5.5)
RBC # BLD AUTO: 2.9 M/UL (ref 4.2–5.4)
SODIUM SERPL-SCNC: 138 MMOL/L (ref 135–145)
WBC # BLD AUTO: 14.6 K/UL (ref 4.8–10.8)

## 2017-06-04 PROCEDURE — A9270 NON-COVERED ITEM OR SERVICE: HCPCS | Performed by: HOSPITALIST

## 2017-06-04 PROCEDURE — 700105 HCHG RX REV CODE 258: Performed by: INTERNAL MEDICINE

## 2017-06-04 PROCEDURE — 700102 HCHG RX REV CODE 250 W/ 637 OVERRIDE(OP): Performed by: HOSPITALIST

## 2017-06-04 PROCEDURE — 700102 HCHG RX REV CODE 250 W/ 637 OVERRIDE(OP): Performed by: INTERNAL MEDICINE

## 2017-06-04 PROCEDURE — 36415 COLL VENOUS BLD VENIPUNCTURE: CPT

## 2017-06-04 PROCEDURE — A9270 NON-COVERED ITEM OR SERVICE: HCPCS | Performed by: INTERNAL MEDICINE

## 2017-06-04 PROCEDURE — 99233 SBSQ HOSP IP/OBS HIGH 50: CPT | Performed by: HOSPITALIST

## 2017-06-04 PROCEDURE — 770006 HCHG ROOM/CARE - MED/SURG/GYN SEMI*

## 2017-06-04 PROCEDURE — 85025 COMPLETE CBC W/AUTO DIFF WBC: CPT

## 2017-06-04 PROCEDURE — 700111 HCHG RX REV CODE 636 W/ 250 OVERRIDE (IP): Performed by: INTERNAL MEDICINE

## 2017-06-04 PROCEDURE — 80048 BASIC METABOLIC PNL TOTAL CA: CPT

## 2017-06-04 RX ORDER — POTASSIUM CHLORIDE 20 MEQ/1
20 TABLET, EXTENDED RELEASE ORAL 2 TIMES DAILY
Qty: 60 TAB | Refills: 0 | Status: SHIPPED | OUTPATIENT
Start: 2017-06-04 | End: 2017-06-05

## 2017-06-04 RX ORDER — DIPHENHYDRAMINE HCL 25 MG
25 TABLET ORAL EVERY 6 HOURS PRN
Status: DISCONTINUED | OUTPATIENT
Start: 2017-06-04 | End: 2017-06-05 | Stop reason: HOSPADM

## 2017-06-04 RX ORDER — AMOXICILLIN 250 MG
2 CAPSULE ORAL 2 TIMES DAILY
Qty: 30 TAB | Refills: 0 | Status: SHIPPED | OUTPATIENT
Start: 2017-06-04 | End: 2017-06-05

## 2017-06-04 RX ORDER — TRAMADOL HYDROCHLORIDE 50 MG/1
50 TABLET ORAL EVERY 6 HOURS PRN
Qty: 30 TAB | Refills: 0 | Status: SHIPPED | OUTPATIENT
Start: 2017-06-04 | End: 2017-06-05

## 2017-06-04 RX ORDER — POTASSIUM CHLORIDE 20 MEQ/1
20 TABLET, EXTENDED RELEASE ORAL 2 TIMES DAILY
Status: DISCONTINUED | OUTPATIENT
Start: 2017-06-04 | End: 2017-06-05 | Stop reason: HOSPADM

## 2017-06-04 RX ADMIN — ASPIRIN 81 MG: 81 TABLET ORAL at 10:19

## 2017-06-04 RX ADMIN — AMLODIPINE BESYLATE 10 MG: 10 TABLET ORAL at 10:19

## 2017-06-04 RX ADMIN — POTASSIUM CHLORIDE 20 MEQ: 1500 TABLET, EXTENDED RELEASE ORAL at 20:28

## 2017-06-04 RX ADMIN — ENOXAPARIN SODIUM 30 MG: 100 INJECTION SUBCUTANEOUS at 10:20

## 2017-06-04 RX ADMIN — POTASSIUM CHLORIDE 20 MEQ: 1500 TABLET, EXTENDED RELEASE ORAL at 10:19

## 2017-06-04 RX ADMIN — CITALOPRAM HYDROBROMIDE 20 MG: 20 TABLET ORAL at 10:19

## 2017-06-04 RX ADMIN — CEFTRIAXONE SODIUM 2 G: 2 INJECTION, POWDER, FOR SOLUTION INTRAMUSCULAR; INTRAVENOUS at 10:19

## 2017-06-04 RX ADMIN — ROSUVASTATIN CALCIUM 40 MG: 20 TABLET ORAL at 20:28

## 2017-06-04 RX ADMIN — DOCUSATE SODIUM AND SENNOSIDES 2 TABLET: 8.6; 5 TABLET, FILM COATED ORAL at 10:20

## 2017-06-04 ASSESSMENT — ENCOUNTER SYMPTOMS
PALPITATIONS: 0
SENSORY CHANGE: 0
NERVOUS/ANXIOUS: 0
FOCAL WEAKNESS: 0
CHILLS: 0
MYALGIAS: 0
NAUSEA: 0
CONSTIPATION: 1
DIARRHEA: 0
DIAPHORESIS: 0
SHORTNESS OF BREATH: 0
ABDOMINAL PAIN: 0
WEAKNESS: 0

## 2017-06-04 ASSESSMENT — PAIN SCALES - GENERAL
PAINLEVEL_OUTOF10: 1
PAINLEVEL_OUTOF10: 1

## 2017-06-04 NOTE — CARE PLAN
Problem: Pain Management  Goal: Pain level will decrease to patient’s comfort goal  Outcome: PROGRESSING AS EXPECTED  Pt expresses adequate pain control with medicating and positioning.

## 2017-06-04 NOTE — CARE PLAN
Problem: Safety  Goal: Will remain free from injury  Outcome: PROGRESSING AS EXPECTED  Safety maintained. Call light within reach. Pt instructed to call and bed alarm on.

## 2017-06-04 NOTE — PROGRESS NOTES
Pt is doing better this time than last night. Pt still gets confused as to where she is, but she is easy to re-orient. She has been sleeping most of the night due to lack of sleep during the day and the night before that. Denies pain at this time. Does not call and impulsive. Bed alarm on. Standby assist to the bathroom with front wheel walker.

## 2017-06-04 NOTE — CARE PLAN
Problem: Safety  Goal: Will remain free from injury  Outcome: PROGRESSING AS EXPECTED  Bed alarm on. Treaded socks on. Hourly rounding. Assisted ambulation. Upper bedrails up. Bed in lowest position.

## 2017-06-04 NOTE — CARE PLAN
Problem: Safety  Goal: Will remain free from injury  Outcome: PROGRESSING AS EXPECTED  Goal: Will remain free from falls  Outcome: PROGRESSING AS EXPECTED    Problem: Fluid Volume:  Goal: Will maintain balanced intake and output  Outcome: PROGRESSING AS EXPECTED

## 2017-06-04 NOTE — PROGRESS NOTES
[facility] Hospitalist Progress Note    Date of Service: 2017    Chief Complaint  82 y.o. female admitted 2017 s/p fall with R knee fx s/p ORIF    Interval Problem Update  - had a nightmare this am, feels better now  - dysuria resolved  Denies ankle pain    Consultants/Specialty  Ortho/walker    Disposition  Home with home health in 1-2 days with clinical improvement  Pt/ot        Review of Systems   Constitutional: Negative for chills and diaphoresis.   HENT: Negative for congestion and hearing loss.    Respiratory: Negative for shortness of breath.    Cardiovascular: Negative for palpitations and leg swelling.   Gastrointestinal: Positive for constipation. Negative for nausea, abdominal pain and diarrhea.   Genitourinary: Positive for dysuria. Negative for frequency.   Musculoskeletal: Negative for myalgias and joint pain.   Neurological: Negative for sensory change, focal weakness and weakness.   Psychiatric/Behavioral: The patient is not nervous/anxious.       Physical Exam  Laboratory/Imaging   Hemodynamics  Temp (24hrs), Av.1 °C (98.7 °F), Min:36.4 °C (97.5 °F), Max:37.7 °C (99.9 °F)   Temperature: 37 °C (98.6 °F)  Pulse  Av.9  Min: 71  Max: 97    Blood Pressure : 118/62 mmHg      Respiratory      Respiration: 17, Pulse Oximetry: 92 %     Work Of Breathing / Effort: Mild       Fluids    Intake/Output Summary (Last 24 hours) at 17 1404  Last data filed at 17 1300   Gross per 24 hour   Intake   1160 ml   Output      0 ml   Net   1160 ml       Nutrition  Orders Placed This Encounter   Procedures   • DIET ORDER     Standing Status: Standing      Number of Occurrences: 1      Standing Expiration Date:      Order Specific Question:  Diet:     Answer:  Regular [1]     Physical Exam   Constitutional: She is oriented to person, place, and time. No distress.   HENT:   Head: Normocephalic and atraumatic.   Nose: Nose normal.   Eyes: EOM are normal. Pupils are equal, round, and reactive to light.    Neck: Normal range of motion.   Cardiovascular: Normal rate and intact distal pulses.    No murmur heard.  Pulmonary/Chest: Effort normal. No respiratory distress.   Abdominal: Soft. She exhibits no distension. There is no tenderness.   Musculoskeletal: She exhibits no edema or tenderness.   + RLE dressing, c/d/i   Neurological: She is alert and oriented to person, place, and time. No cranial nerve deficit. Coordination normal.   Skin: Skin is warm and dry. She is not diaphoretic.   Psychiatric: She has a normal mood and affect. Her behavior is normal. Judgment normal.   Nursing note and vitals reviewed.      Recent Labs      06/02/17 0427 06/03/17 0245 06/04/17   0038   WBC  9.4  16.9*  14.6*   RBC  3.06*  3.25*  2.90*   HEMOGLOBIN  9.2*  9.7*  8.6*   HEMATOCRIT  28.3*  30.3*  26.7*   MCV  92.5  93.2  92.1   MCH  30.1  29.8  29.7   MCHC  32.5*  32.0*  32.2*   RDW  47.4  46.5  46.7   PLATELETCT  288  332  291   MPV  9.9  10.2  10.0     Recent Labs      06/02/17 0427 06/03/17 0245  06/04/17   0038   SODIUM  136  139  138   POTASSIUM  3.5*  3.6  3.5*   CHLORIDE  104  104  107   CO2  25  21  25   GLUCOSE  112*  165*  100*   BUN  9  22  16   CREATININE  0.98  1.16  0.88   CALCIUM  8.7  9.2  8.4*                      Assessment/Plan     * Ankle fracture, right  Assessment & Plan  S/p ORIF, 6/1  Ortho Dr. Luna  Pt/ot  Pain control    - possible home with home health in 1-2 days    UTI (urinary tract infection)  Assessment & Plan  With leukocytosis and ams- improving  + component of sundowning  U. cx neg  Cont rocephin for full day course    Leukocytosis (present on admission)  Assessment & Plan  Resolved  APR    HTN (present on admission)  Assessment & Plan  controlled    History of CVA (present on admission)  Assessment & Plan  Pt/ot    Normocytic anemia (present on admission)  Assessment & Plan  stable    Hypokalemia  Assessment & Plan  Cont supplements      Labs reviewed, Medications reviewed and  Radiology images reviewed

## 2017-06-04 NOTE — FACE TO FACE
Face to Face Note  -  Durable Medical Equipment    Deirdre Montero D.O. - NPI: [id number]  I certify that this patient is under my care and that they have had a durable medical equipment(DME)face to face encounter by myself that meets the physician DME face-to-face encounter requirements with this patient on:    Date of encounter:   Patient:                    MRN:                       YOB: 2017  Rebecca Bishop  [id number]  1935     The encounter with the patient was in whole, or in part, for the following medical condition, which is the primary reason for durable medical equipment:  Post-Op Surgery    I certify that, based on my findings, the following durable medical equipment is medically necessary:  Walkers/bedside commode    HOME O2 Saturation Measurements:(Values must be present for Home Oxygen orders)         ,     ,         My Clinical findings support the need for the above equipment due to:  Abnormal Gait    Supporting Symptoms: pain     ------------------------------------------------------------------------------------------------------------------    Face to Face Supporting Documentation - Home Health    The encounter with this patient was in whole or in part the primary reason for home health admission.    Date of encounter:   Patient:                    MRN:                       YOB: 2017  Rebecca Bishop  [id number]  1935     Home health to see patient for:  Skilled Nursing care for assessment, interventions & education, Wound Care and Occupational therapy evaluation and treatment    Skilled need for:  Surgical Aftercare R ankle surgery wound care    Skilled nursing interventions to include:  Wound Care    Homebound evidenced status by:  Need the aid of supportive devices such as crutches, canes, wheelchairs or walkers or Needs the assistance of another person in order to leave the home. Leaving home must require a considerable and taxing effort. There must  exist a normal inability to leave the home.    Community Physician to provide follow up care: Duane Patel D.O.     Optional Interventions    Wound information & treatment:    Home Infusion Therapy orders:    Line/Drain/Airway:    I certify the face to face encounter for this home care referral meets the CMS requirements and the encounter/clinical assessment with the patient was, in whole, or in part, for the medical condition(s) listed above, which is the primary reason for home health care. Based on my clinical findings: the service(s) are medically necessary, support the need for home health care, and the homebound criteria are met.  I certify that this patient has had a face to face encounter by myself.  Deirdre Montero D.O. - NPI: [id number]    *Debility, frailty and advanced age in the absence of an acute deterioration or exacerbation of a condition do not qualify a patient for home health.

## 2017-06-05 ENCOUNTER — HOME HEALTH ADMISSION (OUTPATIENT)
Dept: HOME HEALTH SERVICES | Facility: HOME HEALTHCARE | Age: 82
End: 2017-06-05
Payer: MEDICARE

## 2017-06-05 VITALS
HEIGHT: 60 IN | SYSTOLIC BLOOD PRESSURE: 113 MMHG | OXYGEN SATURATION: 95 % | BODY MASS INDEX: 19.63 KG/M2 | DIASTOLIC BLOOD PRESSURE: 68 MMHG | WEIGHT: 100 LBS | TEMPERATURE: 98 F | HEART RATE: 92 BPM | RESPIRATION RATE: 17 BRPM

## 2017-06-05 LAB
ANION GAP SERPL CALC-SCNC: 5 MMOL/L (ref 0–11.9)
BACTERIA UR CULT: NORMAL
BASOPHILS # BLD AUTO: 1 % (ref 0–1.8)
BASOPHILS # BLD: 0.13 K/UL (ref 0–0.12)
BUN SERPL-MCNC: 13 MG/DL (ref 8–22)
CALCIUM SERPL-MCNC: 8.9 MG/DL (ref 8.5–10.5)
CHLORIDE SERPL-SCNC: 106 MMOL/L (ref 96–112)
CO2 SERPL-SCNC: 27 MMOL/L (ref 20–33)
CREAT SERPL-MCNC: 0.94 MG/DL (ref 0.5–1.4)
EOSINOPHIL # BLD AUTO: 0.25 K/UL (ref 0–0.51)
EOSINOPHIL NFR BLD: 1.9 % (ref 0–6.9)
ERYTHROCYTE [DISTWIDTH] IN BLOOD BY AUTOMATED COUNT: 47.1 FL (ref 35.9–50)
GFR SERPL CREATININE-BSD FRML MDRD: 57 ML/MIN/1.73 M 2
GLUCOSE SERPL-MCNC: 88 MG/DL (ref 65–99)
HCT VFR BLD AUTO: 28.2 % (ref 37–47)
HGB BLD-MCNC: 9.1 G/DL (ref 12–16)
IMM GRANULOCYTES # BLD AUTO: 0.07 K/UL (ref 0–0.11)
IMM GRANULOCYTES NFR BLD AUTO: 0.5 % (ref 0–0.9)
LYMPHOCYTES # BLD AUTO: 3.03 K/UL (ref 1–4.8)
LYMPHOCYTES NFR BLD: 23.2 % (ref 22–41)
MCH RBC QN AUTO: 30 PG (ref 27–33)
MCHC RBC AUTO-ENTMCNC: 32.3 G/DL (ref 33.6–35)
MCV RBC AUTO: 93.1 FL (ref 81.4–97.8)
MONOCYTES # BLD AUTO: 1.35 K/UL (ref 0–0.85)
MONOCYTES NFR BLD AUTO: 10.3 % (ref 0–13.4)
NEUTROPHILS # BLD AUTO: 8.23 K/UL (ref 2–7.15)
NEUTROPHILS NFR BLD: 63.1 % (ref 44–72)
NRBC # BLD AUTO: 0 K/UL
NRBC BLD AUTO-RTO: 0 /100 WBC
PLATELET # BLD AUTO: 326 K/UL (ref 164–446)
PMV BLD AUTO: 10.1 FL (ref 9–12.9)
POTASSIUM SERPL-SCNC: 4.3 MMOL/L (ref 3.6–5.5)
RBC # BLD AUTO: 3.03 M/UL (ref 4.2–5.4)
SIGNIFICANT IND 70042: NORMAL
SITE SITE: NORMAL
SODIUM SERPL-SCNC: 138 MMOL/L (ref 135–145)
SOURCE SOURCE: NORMAL
WBC # BLD AUTO: 13.1 K/UL (ref 4.8–10.8)

## 2017-06-05 PROCEDURE — 700105 HCHG RX REV CODE 258: Performed by: INTERNAL MEDICINE

## 2017-06-05 PROCEDURE — A9270 NON-COVERED ITEM OR SERVICE: HCPCS | Performed by: HOSPITALIST

## 2017-06-05 PROCEDURE — 99239 HOSP IP/OBS DSCHRG MGMT >30: CPT | Performed by: INTERNAL MEDICINE

## 2017-06-05 PROCEDURE — 80048 BASIC METABOLIC PNL TOTAL CA: CPT

## 2017-06-05 PROCEDURE — 36415 COLL VENOUS BLD VENIPUNCTURE: CPT

## 2017-06-05 PROCEDURE — 700102 HCHG RX REV CODE 250 W/ 637 OVERRIDE(OP): Performed by: HOSPITALIST

## 2017-06-05 PROCEDURE — 85025 COMPLETE CBC W/AUTO DIFF WBC: CPT

## 2017-06-05 PROCEDURE — 700102 HCHG RX REV CODE 250 W/ 637 OVERRIDE(OP): Performed by: INTERNAL MEDICINE

## 2017-06-05 PROCEDURE — 700111 HCHG RX REV CODE 636 W/ 250 OVERRIDE (IP): Performed by: INTERNAL MEDICINE

## 2017-06-05 PROCEDURE — A9270 NON-COVERED ITEM OR SERVICE: HCPCS | Performed by: INTERNAL MEDICINE

## 2017-06-05 RX ADMIN — ENOXAPARIN SODIUM 30 MG: 100 INJECTION SUBCUTANEOUS at 07:50

## 2017-06-05 RX ADMIN — CITALOPRAM HYDROBROMIDE 20 MG: 20 TABLET ORAL at 07:50

## 2017-06-05 RX ADMIN — ASPIRIN 81 MG: 81 TABLET ORAL at 07:50

## 2017-06-05 RX ADMIN — CEFTRIAXONE SODIUM 2 G: 2 INJECTION, POWDER, FOR SOLUTION INTRAMUSCULAR; INTRAVENOUS at 10:03

## 2017-06-05 RX ADMIN — POTASSIUM CHLORIDE 20 MEQ: 1500 TABLET, EXTENDED RELEASE ORAL at 07:50

## 2017-06-05 RX ADMIN — AMLODIPINE BESYLATE 10 MG: 10 TABLET ORAL at 07:51

## 2017-06-05 ASSESSMENT — LIFESTYLE VARIABLES: EVER_SMOKED: NEVER

## 2017-06-05 NOTE — CARE PLAN
Problem: Safety  Goal: Will remain free from injury  Outcome: PROGRESSING AS EXPECTED  Call light at bedside. Bed alarm on. Upper bedrails up. Bed in lowest position.  Treaded socks on. Hourly rounding.

## 2017-06-05 NOTE — PROGRESS NOTES
Pt a & o x 4. Ambulates with 1 assistance. C/o pain 0 /10.   regualr diet.  IV intact, abx going through IV at this time . Skin assessment splint intact RLE.  Pt anticipates discharge today. rn will check with social work in rounds that home care is set up.    Call light and belongings within reach.   Assistive ambulation devices out of reach of pt.   All questions answered.  Will continue to assess.

## 2017-06-05 NOTE — PROGRESS NOTES
Discharged to home by car with relative. Discharged via wheelchair, hospital escort: Refused.    Discharge instructions discussed with patient and daughter . Prescriptions given to patient and daughter  .  IV pulled out.   All questions answered.

## 2017-06-05 NOTE — DISCHARGE PLANNING
Received choice form from [facility] at [phone].  Referral sent to [facility] at [phone] on 06-05-17.

## 2017-06-05 NOTE — PROGRESS NOTES
S:  Seen and examined.  s/p R ankle ORIF.  Doing well this morning.  No new complaints, pain controlled.    O: Blood pressure 119/72, pulse 69, temperature 36.9 °C (98.4 °F), temperature source Temporal, resp. rate 17, height 1.524 m (5'), weight 45.36 kg (100 lb), SpO2 93 %..    Intake/Output Summary (Last 24 hours) at 06/05/17 0742  Last data filed at 06/04/17 1700   Gross per 24 hour   Intake    820 ml   Output      0 ml   Net    820 ml   .    Operative/injured extremity examined.  Distal light touch sensation intact, firing EHL/TA/GS/P.  Toes warm, well-perfused.  Splint c/d/i.    Recent Labs      06/03/17   0245  06/04/17   0038 06/05/17   WBC  16.9*  14.6*  13.1*   RBC  3.25*  2.90*  3.03*   HEMOGLOBIN  9.7*  8.6*  9.1*   HEMATOCRIT  30.3*  26.7*  28.2*   MCV  93.2  92.1  93.1   MCH  29.8  29.7  30.0   MCHC  32.0*  32.2*  32.3*   RDW  46.5  46.7  47.1   PLATELETCT  332  291  326   MPV  10.2  10.0  10.1       A/P:    s/p R ankle ORIF    Antibiotics: None required  Activity: TTWB operative extremity.  PT today.  Diet: General  DVT: Mechanical (SCDs) + Pharmacologic (Per medicine, none required for ortho)  Dispo: D/C planning

## 2017-06-05 NOTE — DISCHARGE PLANNING
TCN met with patient and daughter at bedside to discuss the care teams recommendation for HH. Patient and daughter agreeable to suggestion and selected [facility]. Daughter requested [facility] contact her to set up appointments. TCN notified [facility] of request. TCN to follow as needed for DC planning.

## 2017-06-05 NOTE — DISCHARGE PLANNING
Spoke with Cynthia at [facility], they are awaiting return call from PCP to confirm they will sign order.

## 2017-06-05 NOTE — DISCHARGE INSTRUCTIONS
Discharge Instructions    Discharged to home by car with relative. Discharged via wheelchair, hospital escort: Refused.  Special equipment needed: Walker    Be sure to schedule a follow-up appointment with your primary care doctor or any specialists as instructed.     Discharge Plan:   Diet Plan: Discussed  Activity Level: Discussed  Confirmed Follow up Appointment: Patient to Call and Schedule Appointment  Confirmed Symptoms Management: Discussed  Medication Reconciliation Updated: Yes  Influenza Vaccine Indication: Patient Refuses    I understand that a diet low in cholesterol, fat, and sodium is recommended for good health. Unless I have been given specific instructions below for another diet, I accept this instruction as my diet prescription.   Other diet: regular    Special Instructions: Discharge instructions for the Orthopedic Patient    Follow up with Primary Care Physician within 2 weeks of discharge to home, regarding:  Review of medications and diagnostic testing.  Surveillance for medical complications.  Workup and treatment of osteoporosis, if appropriate.     -Is this a Joint Replacement patient? No    -Is this patient being discharged with medication to prevent blood clots?  Yes, Aspirin Aspirin, ASA oral tablets  What is this medicine?  ASPIRIN (AS pir in) is a pain reliever. It is used to treat mild pain and fever. This medicine is also used as directed by a doctor to prevent and to treat heart attacks, to prevent strokes, and to treat arthritis or inflammation.  This medicine may be used for other purposes; ask your health care provider or pharmacist if you have questions.  COMMON BRAND NAME(S): Aspir-Low, Aspir-Coleen , Aspirtab , [employer] Advanced Aspirin, [employer] Aspirin Extra Strength, [employer] Aspirin Plus, Rob Aspirin, [employer] Genuine Aspirin, [employer] Systems Womens Aspirin , Bufferin Extra Strength, Bufferin Low Dose, Bufferin  What should I tell my health care provider before I take this medicine?  They need  to know if you have any of these conditions:  -anemia  -asthma  -bleeding problems  -child with chickenpox, the flu, or other viral infection  -diabetes  -gout  -if you frequently drink alcohol containing drinks  -kidney disease  -liver disease  -low level of vitamin K  -lupus  -smoke tobacco  -stomach ulcers or other problems  -an unusual or allergic reaction to aspirin, tartrazine dye, other medicines, dyes, or preservatives  -pregnant or trying to get pregnant  -breast-feeding  How should I use this medicine?  Take this medicine by mouth with a glass of water. Follow the directions on the package or prescription label. You can take this medicine with or without food. If it upsets your stomach, take it with food. Do not take your medicine more often than directed.  Talk to your pediatrician regarding the use of this medicine in children. While this drug may be prescribed for children as young as 12 years of age for selected conditions, precautions do apply. Children and teenagers should not use this medicine to treat chicken pox or flu symptoms unless directed by a doctor.  Patients over 65 years old may have a stronger reaction and need a smaller dose.  Overdosage: If you think you have taken too much of this medicine contact a poison control center or emergency room at once.  NOTE: This medicine is only for you. Do not share this medicine with others.  What if I miss a dose?  If you are taking this medicine on a regular schedule and miss a dose, take it as soon as you can. If it is almost time for your next dose, take only that dose. Do not take double or extra doses.  What may interact with this medicine?  Do not take this medicine with any of the following medications:  -cidofovir  -ketorolac  -probenecid  This medicine may also interact with the following medications:  -alcohol  -alendronate  -bismuth subsalicylate  -flavocoxid  -herbal supplements like feverfew, garlic, sandra, ginkgo biloba, horse  chestnut  -medicines for diabetes or glaucoma like acetazolamide, methazolamide  -medicines for gout  -medicines that treat or prevent blood clots like enoxaparin, heparin, ticlopidine, warfarin  -other aspirin and aspirin-like medicines  -NSAIDs, medicines for pain and inflammation, like ibuprofen or naproxen  -pemetrexed  -sulfinpyrazone  -varicella live vaccine  This list may not describe all possible interactions. Give your health care provider a list of all the medicines, herbs, non-prescription drugs, or dietary supplements you use. Also tell them if you smoke, drink alcohol, or use illegal drugs. Some items may interact with your medicine.  What should I watch for while using this medicine?  If you are treating yourself for pain, tell your doctor or health care professional if the pain lasts more than 10 days, if it gets worse, or if there is a new or different kind of pain. Tell your doctor if you see redness or swelling. Also, check with your doctor if you have a fever that lasts for more than 3 days. Only take this medicine to prevent heart attacks or blood clotting if prescribed by your doctor or health care professional.  Do not take aspirin or aspirin-like medicines with this medicine. Too much aspirin can be dangerous. Always read the labels carefully.  This medicine can irritate your stomach or cause bleeding problems. Do not smoke cigarettes or drink alcohol while taking this medicine. Do not lie down for 30 minutes after taking this medicine to prevent irritation to your throat.  If you are scheduled for any medical or dental procedure, tell your healthcare provider that you are taking this medicine. You may need to stop taking this medicine before the procedure.  What side effects may I notice from receiving this medicine?  Side effects that you should report to your doctor or health care professional as soon as possible:  -allergic reactions like skin rash, itching or hives, swelling of the face,  lips, or tongue  -black, tarry stools  -bloody, coffee ground-like vomit  -breathing problems  -changes in hearing, ringing in the ears  -confusion  -general ill feeling or flu-like symptoms  -pain on swallowing  -redness, blistering, peeling or loosening of the skin, including inside the mouth or nose  -trouble passing urine or change in the amount of urine  -unusual bleeding or bruising  -unusually weak or tired  -yellowing of the eyes or skin  Side effects that usually do not require medical attention (report to your doctor or health care professional if they continue or are bothersome):  -diarrhea or constipation  -nausea, vomiting  -stomach gas, heartburn  This list may not describe all possible side effects. Call your doctor for medical advice about side effects. You may report side effects to FDA at 6-516-FDA-2497.  Where should I keep my medicine?  Keep out of the reach of children.  Store at room temperature between 15 and 30 degrees C (59 and 86 degrees F). Protect from heat and moisture. Do not use this medicine if it has a strong vinegar smell. Throw away any unused medicine after the expiration date.  NOTE: This sheet is a summary. It may not cover all possible information. If you have questions about this medicine, talk to your doctor, pharmacist, or health care provider.  © 2014, Elsevier/Gold Standard. (3/10/2009 10:44:17 AM)      · Is patient discharged on Warfarin / Coumadin?   No     · Is patient Post Blood Transfusion?  No    Depression / Suicide Risk    As you are discharged from this [facility] facility, it is important to learn how to keep safe from harming yourself.    Recognize the warning signs:  · Abrupt changes in personality, positive or negative- including increase in energy   · Giving away possessions  · Change in eating patterns- significant weight changes-  positive or negative  · Change in sleeping patterns- unable to sleep or sleeping all the time   · Unwillingness or inability  to communicate  · Depression  · Unusual sadness, discouragement and loneliness  · Talk of wanting to die  · Neglect of personal appearance   · Rebelliousness- reckless behavior  · Withdrawal from people/activities they love  · Confusion- inability to concentrate     If you or a loved one observes any of these behaviors or has concerns about self-harm, here's what you can do:  · Talk about it- your feelings and reasons for harming yourself  · Remove any means that you might use to hurt yourself (examples: pills, rope, extension cords, firearm)  · Get professional help from the community (Mental Health, Substance Abuse, psychological counseling)  · Do not be alone:Call your Safe Contact- someone whom you trust who will be there for you.  · Call your local CRISIS HOTLINE 053-6913 or 953-145-1349  · Call your local Children's Mobile Crisis Response Team [place] (154) 243-5580 or www.BangTango  · Call the toll free National Suicide Prevention Hotlines   · National Suicide Prevention Lifeline 641-531-FBJW (9693)  · National Hope Line Network 800-SUICIDE (997-8557)

## 2017-06-05 NOTE — DISCHARGE PLANNING
Unable to discharge pt until we have confirmation of Home Health. HH attempting to get confirmation from PCP that he will follow pt and write orders.

## 2017-06-05 NOTE — DISCHARGE PLANNING
Met with pt and daughter, daughter states that [facility] and informed her that they will see pt tomorrow or Wednesday. Pt will discharge to home with [facility] from [facility].

## 2017-06-05 NOTE — PROGRESS NOTES
AOx 3-4. Disoriented to place but easily re-oriented. Pt has been calling appropriately. Pain minimal. Declines pain meds. Ambulates to the bathroom with 1 assist. Steady. Drinking and voiding. Sleeping comfortably.

## 2017-06-06 ENCOUNTER — HOME CARE VISIT (OUTPATIENT)
Dept: HOME HEALTH SERVICES | Facility: HOME HEALTHCARE | Age: 82
End: 2017-06-06

## 2017-06-06 ENCOUNTER — HOME CARE VISIT (OUTPATIENT)
Dept: HOME HEALTH SERVICES | Facility: HOME HEALTHCARE | Age: 82
End: 2017-06-06
Payer: MEDICARE

## 2017-06-06 VITALS
DIASTOLIC BLOOD PRESSURE: 68 MMHG | HEART RATE: 78 BPM | RESPIRATION RATE: 20 BRPM | SYSTOLIC BLOOD PRESSURE: 122 MMHG | TEMPERATURE: 98.6 F | HEIGHT: 60 IN

## 2017-06-06 PROCEDURE — 665001 SOC-HOME HEALTH

## 2017-06-06 PROCEDURE — 665999 HH PPS REVENUE DEBIT

## 2017-06-06 PROCEDURE — G0162 HHC RN E&M PLAN SVS, 15 MIN: HCPCS

## 2017-06-06 PROCEDURE — 665998 HH PPS REVENUE CREDIT

## 2017-06-06 SDOH — ECONOMIC STABILITY: HOUSING INSECURITY: HOME SAFETY: DTR STAYING WITH PT DURING RECOVERY

## 2017-06-06 SDOH — ECONOMIC STABILITY: HOUSING INSECURITY: UNSAFE COOKING RANGE AREA: 0

## 2017-06-06 SDOH — ECONOMIC STABILITY: HOUSING INSECURITY: UNSAFE APPLIANCES: 0

## 2017-06-06 ASSESSMENT — PATIENT HEALTH QUESTIONNAIRE - PHQ9
1. LITTLE INTEREST OR PLEASURE IN DOING THINGS: 00
2. FEELING DOWN, DEPRESSED, IRRITABLE, OR HOPELESS: 00

## 2017-06-06 ASSESSMENT — ACTIVITIES OF DAILY LIVING (ADL)
HOME_HEALTH_OASIS: 01
OASIS_M1830: 05

## 2017-06-06 NOTE — DISCHARGE SUMMARY
DATE OF ADMISSION:  06/01/2017    DATE OF DISCHARGE:  06/05/2017    PRIMARY CARE PROVIDER:  Duane Patel DO    ORTHOPEDIC SURGEON:  Ricardo Luna MD    OUTPATIENT CARDIOLOGIST:  Dr. Whitaker.    PROCEDURE COMPLETED:  Right ankle ORIF on 06/01/2017 by Dr. Luna.    CHIEF COMPLAINT ON ADMISSION:  Status post fall with right ankle pain.    DISCHARGE DIAGNOSES:  1.  Right trimalleolar ankle fracture status post open reduction internal   fixation.  2.  Urinary tract infection.  3.  Altered mentation secondary to above, resolved.  4.  Leukocytosis, improving.  5.  Hypertension.  6.  History of cerebrovascular accident with no significant debility.  7.  Normocytic anemia.  8.  Hypokalemia, resolved.    DISCHARGE MEDICATIONS:  1.  Norvasc 10 mg p.o. daily.  2.  Aspirin 81 mg daily.  3.  Celexa 20 mg daily.  4.  Crestor 40 mg p.o. at bedtime.    HOSPITAL COURSE:  Patient is a very pleasant and functional 82-year-old female   with known history of CVA as well as hypertension, presents with complaints   of right ankle pain status post fall.  Patient did have an x-ray completed,   which revealed a right trimalleolar ankle fracture.  Patient was seen by   orthopedic surgeon, Dr. Luna.  During this admission, patient did have a   right ankle ORIF on 06/01/2017.  Patient tolerated procedure well.  Patient   has been seen by therapy with standby assist with a front wheel walker as well   as occupational therapy.  Patient's pain is under control.  During the course   of her stay, patient was noted to be altered with associated leukocytosis and   urinary tract infection.  Patient has completed 3-day course of IV Rocephin   with resolution of symptoms.  Urine cultures remained negative.  Leukocytosis   has improved.  At this point, given her significant improvement, patient will   be discharged to home with family and home health.    DISPOSITION:  To home with front wheel walker and home health with 24-hour    assistance.    CONDITION:  Fair.    FOLLOWUP INSTRUCTIONS:  Primary care provider in 1 week, orthopedic surgeon as   scheduled.    Total discharge preparation time is 45 minutes.       ____________________________________     DEN MCCALL DO    EN / ROXANA    DD:  06/05/2017 13:19:31  DT:  06/06/2017 00:22:50    D#:  [id number]  Job#:  [id number]

## 2017-06-07 PROCEDURE — 665998 HH PPS REVENUE CREDIT

## 2017-06-07 PROCEDURE — 665999 HH PPS REVENUE DEBIT

## 2017-06-08 PROCEDURE — 665999 HH PPS REVENUE DEBIT

## 2017-06-08 PROCEDURE — 665998 HH PPS REVENUE CREDIT

## 2017-06-09 PROCEDURE — 665998 HH PPS REVENUE CREDIT

## 2017-06-09 PROCEDURE — 665999 HH PPS REVENUE DEBIT

## 2017-06-10 ENCOUNTER — HOME CARE VISIT (OUTPATIENT)
Dept: HOME HEALTH SERVICES | Facility: HOME HEALTHCARE | Age: 82
End: 2017-06-10
Payer: MEDICARE

## 2017-06-10 VITALS
SYSTOLIC BLOOD PRESSURE: 124 MMHG | HEART RATE: 84 BPM | RESPIRATION RATE: 16 BRPM | DIASTOLIC BLOOD PRESSURE: 72 MMHG | TEMPERATURE: 97.4 F

## 2017-06-10 PROCEDURE — G0151 HHCP-SERV OF PT,EA 15 MIN: HCPCS

## 2017-06-10 PROCEDURE — G0300 HHS/HOSPICE OF LPN EA 15 MIN: HCPCS

## 2017-06-10 PROCEDURE — 665999 HH PPS REVENUE DEBIT

## 2017-06-10 PROCEDURE — 665998 HH PPS REVENUE CREDIT

## 2017-06-10 ASSESSMENT — ACTIVITIES OF DAILY LIVING (ADL)
ADLS_COMMENTS: <!--EPICS-->SEE OT EVAL<!--EPICE-->
IADLS_COMMENTS: <!--EPICS-->SEE OT EVAL<!--EPICE-->

## 2017-06-11 PROCEDURE — 665999 HH PPS REVENUE DEBIT

## 2017-06-11 PROCEDURE — 665998 HH PPS REVENUE CREDIT

## 2017-06-12 ENCOUNTER — HOME CARE VISIT (OUTPATIENT)
Dept: HOME HEALTH SERVICES | Facility: HOME HEALTHCARE | Age: 82
End: 2017-06-12
Payer: MEDICARE

## 2017-06-12 VITALS
DIASTOLIC BLOOD PRESSURE: 72 MMHG | TEMPERATURE: 97.4 F | SYSTOLIC BLOOD PRESSURE: 124 MMHG | RESPIRATION RATE: 16 BRPM | HEART RATE: 84 BPM

## 2017-06-12 VITALS
TEMPERATURE: 99.8 F | RESPIRATION RATE: 18 BRPM | DIASTOLIC BLOOD PRESSURE: 62 MMHG | SYSTOLIC BLOOD PRESSURE: 138 MMHG | HEART RATE: 84 BPM

## 2017-06-12 PROCEDURE — 665999 HH PPS REVENUE DEBIT

## 2017-06-12 PROCEDURE — G0493 RN CARE EA 15 MIN HH/HOSPICE: HCPCS

## 2017-06-12 PROCEDURE — 665998 HH PPS REVENUE CREDIT

## 2017-06-12 SDOH — ECONOMIC STABILITY: HOUSING INSECURITY: UNSAFE COOKING RANGE AREA: 0

## 2017-06-12 SDOH — ECONOMIC STABILITY: HOUSING INSECURITY: UNSAFE APPLIANCES: 0

## 2017-06-12 ASSESSMENT — ENCOUNTER SYMPTOMS
NAUSEA: DENIES
RESPIRATORY SYMPTOMS COMMENTS: PT LUNGS CLEAR IN ALL FIELDS, NO ADVANTAGEOUS SOUND NOTED.
VOMITING: DENIES

## 2017-06-13 ENCOUNTER — HOME CARE VISIT (OUTPATIENT)
Dept: HOME HEALTH SERVICES | Facility: HOME HEALTHCARE | Age: 82
End: 2017-06-13
Payer: MEDICARE

## 2017-06-13 PROCEDURE — 665998 HH PPS REVENUE CREDIT

## 2017-06-13 PROCEDURE — 665999 HH PPS REVENUE DEBIT

## 2017-06-13 PROCEDURE — G0151 HHCP-SERV OF PT,EA 15 MIN: HCPCS

## 2017-06-14 VITALS
SYSTOLIC BLOOD PRESSURE: 118 MMHG | HEART RATE: 79 BPM | RESPIRATION RATE: 20 BRPM | DIASTOLIC BLOOD PRESSURE: 68 MMHG | TEMPERATURE: 97 F

## 2017-06-14 PROCEDURE — 665998 HH PPS REVENUE CREDIT

## 2017-06-14 PROCEDURE — 665999 HH PPS REVENUE DEBIT

## 2017-06-15 ENCOUNTER — HOME CARE VISIT (OUTPATIENT)
Dept: HOME HEALTH SERVICES | Facility: HOME HEALTHCARE | Age: 82
End: 2017-06-15
Payer: MEDICARE

## 2017-06-15 PROCEDURE — G0299 HHS/HOSPICE OF RN EA 15 MIN: HCPCS

## 2017-06-15 PROCEDURE — 665999 HH PPS REVENUE DEBIT

## 2017-06-15 PROCEDURE — 665998 HH PPS REVENUE CREDIT

## 2017-06-15 PROCEDURE — G0152 HHCP-SERV OF OT,EA 15 MIN: HCPCS

## 2017-06-16 ENCOUNTER — HOME CARE VISIT (OUTPATIENT)
Dept: HOME HEALTH SERVICES | Facility: HOME HEALTHCARE | Age: 82
End: 2017-06-16
Payer: MEDICARE

## 2017-06-16 VITALS
RESPIRATION RATE: 18 BRPM | TEMPERATURE: 98.3 F | DIASTOLIC BLOOD PRESSURE: 70 MMHG | HEART RATE: 73 BPM | SYSTOLIC BLOOD PRESSURE: 120 MMHG

## 2017-06-16 PROCEDURE — 665999 HH PPS REVENUE DEBIT

## 2017-06-16 PROCEDURE — G0151 HHCP-SERV OF PT,EA 15 MIN: HCPCS

## 2017-06-16 PROCEDURE — 665998 HH PPS REVENUE CREDIT

## 2017-06-16 ASSESSMENT — ACTIVITIES OF DAILY LIVING (ADL)
BATHING_ASSISTIVE_EQUIPMENT_NEEDED: TUB TRANSFER BENCH
BATHING_ASSISTIVE_EQUIPMENT_USED: SHOWER CHAIR, HANDHELD SHOWER

## 2017-06-17 PROCEDURE — 665998 HH PPS REVENUE CREDIT

## 2017-06-17 PROCEDURE — 665999 HH PPS REVENUE DEBIT

## 2017-06-18 VITALS
HEART RATE: 87 BPM | SYSTOLIC BLOOD PRESSURE: 128 MMHG | TEMPERATURE: 97.5 F | RESPIRATION RATE: 18 BRPM | DIASTOLIC BLOOD PRESSURE: 54 MMHG

## 2017-06-18 PROCEDURE — 665999 HH PPS REVENUE DEBIT

## 2017-06-18 PROCEDURE — 665998 HH PPS REVENUE CREDIT

## 2017-06-19 ENCOUNTER — HOME CARE VISIT (OUTPATIENT)
Dept: HOME HEALTH SERVICES | Facility: HOME HEALTHCARE | Age: 82
End: 2017-06-19
Payer: MEDICARE

## 2017-06-19 PROCEDURE — 665998 HH PPS REVENUE CREDIT

## 2017-06-19 PROCEDURE — 665999 HH PPS REVENUE DEBIT

## 2017-06-20 ENCOUNTER — HOSPITAL ENCOUNTER (OUTPATIENT)
Dept: LAB | Facility: MEDICAL CENTER | Age: 82
End: 2017-06-20
Attending: FAMILY MEDICINE
Payer: MEDICARE

## 2017-06-20 VITALS
HEART RATE: 73 BPM | RESPIRATION RATE: 18 BRPM | DIASTOLIC BLOOD PRESSURE: 70 MMHG | TEMPERATURE: 98.3 F | SYSTOLIC BLOOD PRESSURE: 120 MMHG

## 2017-06-20 LAB
ALBUMIN SERPL BCP-MCNC: 4 G/DL (ref 3.2–4.9)
ALBUMIN/GLOB SERPL: 1.3 G/DL
ALP SERPL-CCNC: 109 U/L (ref 30–99)
ALT SERPL-CCNC: 15 U/L (ref 2–50)
ANION GAP SERPL CALC-SCNC: 10 MMOL/L (ref 0–11.9)
AST SERPL-CCNC: 22 U/L (ref 12–45)
BILIRUB SERPL-MCNC: 0.5 MG/DL (ref 0.1–1.5)
BUN SERPL-MCNC: 17 MG/DL (ref 8–22)
CALCIUM SERPL-MCNC: 9.5 MG/DL (ref 8.5–10.5)
CHLORIDE SERPL-SCNC: 107 MMOL/L (ref 96–112)
CHOLEST SERPL-MCNC: 150 MG/DL (ref 100–199)
CO2 SERPL-SCNC: 25 MMOL/L (ref 20–33)
CREAT SERPL-MCNC: 0.88 MG/DL (ref 0.5–1.4)
EST. AVERAGE GLUCOSE BLD GHB EST-MCNC: 123 MG/DL
GFR SERPL CREATININE-BSD FRML MDRD: >60 ML/MIN/1.73 M 2
GLOBULIN SER CALC-MCNC: 3.1 G/DL (ref 1.9–3.5)
GLUCOSE SERPL-MCNC: 96 MG/DL (ref 65–99)
HBA1C MFR BLD: 5.9 % (ref 0–5.6)
HDLC SERPL-MCNC: 95 MG/DL
LDLC SERPL CALC-MCNC: 40 MG/DL
POTASSIUM SERPL-SCNC: 3.8 MMOL/L (ref 3.6–5.5)
PROT SERPL-MCNC: 7.1 G/DL (ref 6–8.2)
SODIUM SERPL-SCNC: 142 MMOL/L (ref 135–145)
TRIGL SERPL-MCNC: 74 MG/DL (ref 0–149)

## 2017-06-20 PROCEDURE — 83036 HEMOGLOBIN GLYCOSYLATED A1C: CPT | Mod: GA

## 2017-06-20 PROCEDURE — 36415 COLL VENOUS BLD VENIPUNCTURE: CPT

## 2017-06-20 PROCEDURE — 665999 HH PPS REVENUE DEBIT

## 2017-06-20 PROCEDURE — 665998 HH PPS REVENUE CREDIT

## 2017-06-20 PROCEDURE — 80061 LIPID PANEL: CPT

## 2017-06-20 PROCEDURE — 80053 COMPREHEN METABOLIC PANEL: CPT

## 2017-06-20 SDOH — ECONOMIC STABILITY: HOUSING INSECURITY: UNSAFE APPLIANCES: 0

## 2017-06-20 SDOH — ECONOMIC STABILITY: HOUSING INSECURITY: UNSAFE COOKING RANGE AREA: 0

## 2017-06-21 ENCOUNTER — HOME CARE VISIT (OUTPATIENT)
Dept: HOME HEALTH SERVICES | Facility: HOME HEALTHCARE | Age: 82
End: 2017-06-21
Payer: MEDICARE

## 2017-06-21 VITALS
DIASTOLIC BLOOD PRESSURE: 58 MMHG | TEMPERATURE: 98.3 F | HEART RATE: 83 BPM | RESPIRATION RATE: 20 BRPM | SYSTOLIC BLOOD PRESSURE: 118 MMHG

## 2017-06-21 PROCEDURE — 665998 HH PPS REVENUE CREDIT

## 2017-06-21 PROCEDURE — G0151 HHCP-SERV OF PT,EA 15 MIN: HCPCS

## 2017-06-21 PROCEDURE — 665999 HH PPS REVENUE DEBIT

## 2017-06-22 PROCEDURE — 665998 HH PPS REVENUE CREDIT

## 2017-06-22 PROCEDURE — 665999 HH PPS REVENUE DEBIT

## 2017-06-23 ENCOUNTER — HOME CARE VISIT (OUTPATIENT)
Dept: HOME HEALTH SERVICES | Facility: HOME HEALTHCARE | Age: 82
End: 2017-06-23
Payer: MEDICARE

## 2017-06-23 PROCEDURE — G0152 HHCP-SERV OF OT,EA 15 MIN: HCPCS

## 2017-06-23 PROCEDURE — 665998 HH PPS REVENUE CREDIT

## 2017-06-23 PROCEDURE — 665999 HH PPS REVENUE DEBIT

## 2017-06-24 PROCEDURE — 665998 HH PPS REVENUE CREDIT

## 2017-06-24 PROCEDURE — 665999 HH PPS REVENUE DEBIT

## 2017-06-25 VITALS
RESPIRATION RATE: 16 BRPM | SYSTOLIC BLOOD PRESSURE: 120 MMHG | HEART RATE: 84 BPM | DIASTOLIC BLOOD PRESSURE: 60 MMHG | TEMPERATURE: 99.3 F

## 2017-06-25 PROCEDURE — 665998 HH PPS REVENUE CREDIT

## 2017-06-25 PROCEDURE — 665999 HH PPS REVENUE DEBIT

## 2017-06-25 NOTE — CONSULTS
DATE OF SERVICE:  2017    CHIEF COMPLAINT:  Right trimalleolar ankle fracture.    HISTORY OF PRESENT ILLNESS:  The patient is a pleasant 82-year-old female, who   sustained a ground level fall today and subsequent right trimalleolar ankle   fracture dislocation.  This was reduced in the emergency department.  She has   a normal skin envelope around the right ankle and a normal neurovascular exam   with complaints of new onset pain in her right ankle, which prevents any   weightbearing.  She denies any prior problems or injuries to that ankle.  She   indicates this is a mechanical fall.  She denies any history of syncope, chest   pain, or other symptoms.  She states this is mostly due to her right knee   giving out.    REVIEW OF SYSTEMS:  As per HPI, otherwise all systems reviewed and negative.    ALLERGIES:  No known drug allergies.    CURRENT OUTPATIENT MEDICATIONS:  1.  Norvasc.  2.  Aspirin.  3.  Celexa.  4.  Crestor.    PAST MEDICAL HISTORY:  1.  Hypertension.  2.  Dyslipidemia.  3.  Depression.  4.  History of stroke and TIA in the past with no residual deficits.    PAST SURGICAL HISTORY:  1.  Right knee arthroscopy.  2.  Total abdominal hysterectomy.    SOCIAL HISTORY:  She is , 4 pregnancies, 3 children, nonsmoker.  Does   not consume significant amounts of alcohol.  Lives independently.    FAMILY HISTORY:  Father  from heart disease, mother from emphysema,   otherwise unremarkable.    PHYSICAL EXAMINATION:  VITAL SIGNS:  Temperature 98.4, heart rate 94, respirations 16, blood pressure   120/69, O2 saturation 95% on room air, height 5 feet tall, weight 100 pounds.  GENERAL:  Well-appearing, in no distress.  HEENT:  Normocephalic, atraumatic.  Eyes, pupils are equal, round and reactive   to light.  NECK:  Pain free range of motion and midline trachea.  CHEST:  Symmetric expansion.  No respiratory distress.  HEART:  Regular rate and rhythm.  ABDOMEN:  Soft, nontender, nondistended.  LYMPHATICS:   Bilateral lower extremity lymphedema.  NEUROLOGIC:  Intact light touch sensation throughout both feet intact motor   function, tibialis anterior, gastroc soleus complex, EHL, peroneals of the   right, best form of acute right foot.  Palpable dorsalis pedal pulse.  Cap   refill less than 2 seconds.  MUSCULOSKELETAL:  Tenderness to palpation about the right ankle, both medially   and laterally and some bruising and swelling, but minimal.  SKIN:  Above the right ankle, there is moderate amount of bruising, otherwise   unremarkable.  PSYCHIATRIC:  Appropriate response to question.  Alert and oriented.  Pleasant   mood and affect.    LABORATORY DATA:  My review of the labs reveals white count of 12.5, otherwise   unremarkable.  CMP demonstrates potassium 3.5.    IMAGING:  My review of radiographs of right ankle reveal a trimalleolar   fracture dislocation, status post reduction with some residual displacement of   the medial malleolus and lateral malleolus.    IMPRESSION:  Right trimalleolar ankle fracture.    PLAN:  I have recommended operative fixation of her ankle fracture.    We discussed the risks, benefits, and alternatives including the risks of   infection, wound healing complications, neurovascular injury, blood loss, DVT,   PE, malunion, nonunion, stiffness, arthritis, and the medical risks of   anesthesia.  We have discussed alternatives including nonoperative management,   which I did not recommend given the fact that she has already dislocated.    There is residual displacement of fracture.  Benefits include improved chance   of union, acceptable alignment, improved function.  They are happy with the   plan.  She will be admitted to medicine for her hypokalemia.  She will be kept   n.p.o. in preparation for surgery.  Postoperatively, she will likely be toe   touch weightbearing.  We will plan to place her in a boot or splint.  She may   need assistance with discharge planning.  From an orthopedic  perspective,   chemical DVT prophylaxis as an outpatient is not required for ankle.       ____________________________________     MD MOIZ [place] / ROXANA    DD:  06/25/2017 09:37:54  DT:  06/25/2017 11:10:30    D#:  [id number]  Job#:  [id number]

## 2017-06-26 PROCEDURE — 665999 HH PPS REVENUE DEBIT

## 2017-06-26 PROCEDURE — 665998 HH PPS REVENUE CREDIT

## 2017-06-27 PROCEDURE — 665998 HH PPS REVENUE CREDIT

## 2017-06-27 PROCEDURE — 665999 HH PPS REVENUE DEBIT

## 2017-06-28 ENCOUNTER — HOME CARE VISIT (OUTPATIENT)
Dept: HOME HEALTH SERVICES | Facility: HOME HEALTHCARE | Age: 82
End: 2017-06-28
Payer: MEDICARE

## 2017-06-28 PROCEDURE — 665999 HH PPS REVENUE DEBIT

## 2017-06-28 PROCEDURE — G0152 HHCP-SERV OF OT,EA 15 MIN: HCPCS

## 2017-06-28 PROCEDURE — 665998 HH PPS REVENUE CREDIT

## 2017-06-29 ENCOUNTER — HOME CARE VISIT (OUTPATIENT)
Dept: HOME HEALTH SERVICES | Facility: HOME HEALTHCARE | Age: 82
End: 2017-06-29
Payer: MEDICARE

## 2017-06-29 VITALS
HEART RATE: 78 BPM | SYSTOLIC BLOOD PRESSURE: 118 MMHG | TEMPERATURE: 99.4 F | DIASTOLIC BLOOD PRESSURE: 52 MMHG | RESPIRATION RATE: 16 BRPM

## 2017-06-29 VITALS
TEMPERATURE: 97.7 F | DIASTOLIC BLOOD PRESSURE: 68 MMHG | SYSTOLIC BLOOD PRESSURE: 130 MMHG | RESPIRATION RATE: 18 BRPM | HEART RATE: 77 BPM

## 2017-06-29 PROCEDURE — 665999 HH PPS REVENUE DEBIT

## 2017-06-29 PROCEDURE — 665998 HH PPS REVENUE CREDIT

## 2017-06-29 PROCEDURE — G0151 HHCP-SERV OF PT,EA 15 MIN: HCPCS

## 2017-06-29 ASSESSMENT — ACTIVITIES OF DAILY LIVING (ADL)
TELEPHONE_ASSISTANCE: 0
BATHING_ASSISTANCE: 2
GROOMING_ASSISTANCE: 0
DRESSING_UB_ASSISTANCE: 0
HOUSEKEEPING_ASSISTANCE: 6
SHOPPING_ASSISTANCE: 6
MEAL_PREP_ASSISTANCE: 0
DRESSING_LB_ASSISTANCE: 0
TOILETING_ASSISTANCE: 0
LAUNDRY_ASSISTANCE: 6
EATING_ASSISTANCE: 0
ORAL_CARE_ASSISTANCE: 0
TRANSPORTATION_ASSISTANCE: 6

## 2017-06-30 PROCEDURE — 665999 HH PPS REVENUE DEBIT

## 2017-06-30 PROCEDURE — 665998 HH PPS REVENUE CREDIT

## 2017-07-01 PROCEDURE — 665998 HH PPS REVENUE CREDIT

## 2017-07-01 PROCEDURE — 665999 HH PPS REVENUE DEBIT

## 2017-07-02 PROCEDURE — 665999 HH PPS REVENUE DEBIT

## 2017-07-02 PROCEDURE — 665998 HH PPS REVENUE CREDIT

## 2017-07-03 PROCEDURE — 665999 HH PPS REVENUE DEBIT

## 2017-07-03 PROCEDURE — 665998 HH PPS REVENUE CREDIT

## 2017-07-04 PROCEDURE — 665998 HH PPS REVENUE CREDIT

## 2017-07-04 PROCEDURE — 665999 HH PPS REVENUE DEBIT

## 2017-07-05 PROCEDURE — 665998 HH PPS REVENUE CREDIT

## 2017-07-05 PROCEDURE — 665999 HH PPS REVENUE DEBIT

## 2017-07-06 ENCOUNTER — HOME CARE VISIT (OUTPATIENT)
Dept: HOME HEALTH SERVICES | Facility: HOME HEALTHCARE | Age: 82
End: 2017-07-06
Payer: MEDICARE

## 2017-07-06 VITALS
DIASTOLIC BLOOD PRESSURE: 60 MMHG | SYSTOLIC BLOOD PRESSURE: 128 MMHG | HEART RATE: 83 BPM | RESPIRATION RATE: 16 BRPM | TEMPERATURE: 209.1 F

## 2017-07-06 PROCEDURE — 665999 HH PPS REVENUE DEBIT

## 2017-07-06 PROCEDURE — G0151 HHCP-SERV OF PT,EA 15 MIN: HCPCS

## 2017-07-06 PROCEDURE — 665998 HH PPS REVENUE CREDIT

## 2017-07-06 ASSESSMENT — ACTIVITIES OF DAILY LIVING (ADL): OASIS_M1830: 01

## 2017-07-07 PROCEDURE — 665998 HH PPS REVENUE CREDIT

## 2017-07-07 PROCEDURE — 665999 HH PPS REVENUE DEBIT

## 2017-07-07 ASSESSMENT — ACTIVITIES OF DAILY LIVING (ADL): HOME_HEALTH_OASIS: 01

## 2017-07-08 PROCEDURE — 665999 HH PPS REVENUE DEBIT

## 2017-07-08 PROCEDURE — 665998 HH PPS REVENUE CREDIT

## 2017-07-09 PROCEDURE — 665999 HH PPS REVENUE DEBIT

## 2017-07-09 PROCEDURE — 665998 HH PPS REVENUE CREDIT

## 2017-07-10 PROCEDURE — 665998 HH PPS REVENUE CREDIT

## 2017-07-10 PROCEDURE — 665999 HH PPS REVENUE DEBIT

## 2017-07-11 PROCEDURE — 665998 HH PPS REVENUE CREDIT

## 2017-07-11 PROCEDURE — 665999 HH PPS REVENUE DEBIT

## 2017-07-12 PROCEDURE — 665999 HH PPS REVENUE DEBIT

## 2017-07-12 PROCEDURE — 665998 HH PPS REVENUE CREDIT

## 2017-07-13 PROCEDURE — 665998 HH PPS REVENUE CREDIT

## 2017-07-13 PROCEDURE — 665999 HH PPS REVENUE DEBIT

## 2017-07-14 PROCEDURE — 665998 HH PPS REVENUE CREDIT

## 2017-07-14 PROCEDURE — 665999 HH PPS REVENUE DEBIT

## 2017-07-15 PROCEDURE — 665998 HH PPS REVENUE CREDIT

## 2017-07-15 PROCEDURE — 665999 HH PPS REVENUE DEBIT

## 2017-07-16 PROCEDURE — 665998 HH PPS REVENUE CREDIT

## 2017-07-16 PROCEDURE — 665999 HH PPS REVENUE DEBIT

## 2017-07-17 PROCEDURE — 665999 HH PPS REVENUE DEBIT

## 2017-07-17 PROCEDURE — 665998 HH PPS REVENUE CREDIT

## 2017-07-18 PROCEDURE — 665998 HH PPS REVENUE CREDIT

## 2017-07-18 PROCEDURE — 665999 HH PPS REVENUE DEBIT

## 2017-07-19 PROCEDURE — 665998 HH PPS REVENUE CREDIT

## 2017-07-19 PROCEDURE — 665999 HH PPS REVENUE DEBIT

## 2017-07-20 PROCEDURE — 665998 HH PPS REVENUE CREDIT

## 2017-07-20 PROCEDURE — 665999 HH PPS REVENUE DEBIT

## 2017-07-21 PROCEDURE — 665999 HH PPS REVENUE DEBIT

## 2017-07-21 PROCEDURE — 665998 HH PPS REVENUE CREDIT

## 2017-07-22 PROCEDURE — 665999 HH PPS REVENUE DEBIT

## 2017-07-22 PROCEDURE — 665998 HH PPS REVENUE CREDIT

## 2017-07-23 PROCEDURE — 665998 HH PPS REVENUE CREDIT

## 2017-07-23 PROCEDURE — 665999 HH PPS REVENUE DEBIT

## 2018-04-19 ENCOUNTER — HOSPITAL ENCOUNTER (OUTPATIENT)
Dept: LAB | Facility: MEDICAL CENTER | Age: 83
End: 2018-04-19
Attending: FAMILY MEDICINE
Payer: MEDICARE

## 2018-04-19 LAB
ALBUMIN SERPL BCP-MCNC: 4.2 G/DL (ref 3.2–4.9)
ALBUMIN/GLOB SERPL: 1.1 G/DL
ALP SERPL-CCNC: 90 U/L (ref 30–99)
ALT SERPL-CCNC: 17 U/L (ref 2–50)
ANION GAP SERPL CALC-SCNC: 12 MMOL/L (ref 0–11.9)
AST SERPL-CCNC: 27 U/L (ref 12–45)
BILIRUB SERPL-MCNC: 0.6 MG/DL (ref 0.1–1.5)
BUN SERPL-MCNC: 13 MG/DL (ref 8–22)
CALCIUM SERPL-MCNC: 9.5 MG/DL (ref 8.5–10.5)
CHLORIDE SERPL-SCNC: 105 MMOL/L (ref 96–112)
CHOLEST SERPL-MCNC: 165 MG/DL (ref 100–199)
CO2 SERPL-SCNC: 22 MMOL/L (ref 20–33)
CREAT SERPL-MCNC: 0.86 MG/DL (ref 0.5–1.4)
GLOBULIN SER CALC-MCNC: 3.7 G/DL (ref 1.9–3.5)
GLUCOSE SERPL-MCNC: 129 MG/DL (ref 65–99)
HDLC SERPL-MCNC: 119 MG/DL
LDLC SERPL CALC-MCNC: 24 MG/DL
POTASSIUM SERPL-SCNC: 4.1 MMOL/L (ref 3.6–5.5)
PROT SERPL-MCNC: 7.9 G/DL (ref 6–8.2)
SODIUM SERPL-SCNC: 139 MMOL/L (ref 135–145)
TRIGL SERPL-MCNC: 110 MG/DL (ref 0–149)

## 2018-04-19 PROCEDURE — 36415 COLL VENOUS BLD VENIPUNCTURE: CPT

## 2018-04-19 PROCEDURE — 80053 COMPREHEN METABOLIC PANEL: CPT

## 2018-04-19 PROCEDURE — 80061 LIPID PANEL: CPT

## 2018-10-09 ENCOUNTER — HOSPITAL ENCOUNTER (INPATIENT)
Facility: MEDICAL CENTER | Age: 83
LOS: 6 days | DRG: 481 | End: 2018-10-15
Attending: EMERGENCY MEDICINE | Admitting: HOSPITALIST
Payer: MEDICARE

## 2018-10-09 ENCOUNTER — APPOINTMENT (OUTPATIENT)
Dept: RADIOLOGY | Facility: MEDICAL CENTER | Age: 83
DRG: 481 | End: 2018-10-09
Attending: EMERGENCY MEDICINE
Payer: MEDICARE

## 2018-10-09 DIAGNOSIS — D64.9 ANEMIA, UNSPECIFIED TYPE: ICD-10-CM
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
## 2018-10-09 DIAGNOSIS — S72.001A CLOSED FRACTURE OF RIGHT HIP, INITIAL ENCOUNTER (HCC): ICD-10-CM

## 2018-10-09 DIAGNOSIS — S72.8X1A OTHER CLOSED FRACTURE OF RIGHT FEMUR, UNSPECIFIED PORTION OF FEMUR, INITIAL ENCOUNTER (HCC): ICD-10-CM

## 2018-10-09 DIAGNOSIS — R79.89 ELEVATED TROPONIN: ICD-10-CM

## 2018-10-09 PROBLEM — M62.82 RHABDOMYOLYSIS: Status: ACTIVE | Noted: 2018-10-09

## 2018-10-09 PROBLEM — R79.9 ELEVATED BUN: Status: ACTIVE | Noted: 2018-10-09

## 2018-10-09 PROBLEM — S72.90XA FEMUR FRACTURE (HCC): Status: ACTIVE | Noted: 2018-10-09

## 2018-10-09 LAB
ABO GROUP BLD: NORMAL
ABO GROUP BLD: NORMAL
ALBUMIN SERPL BCP-MCNC: 4.1 G/DL (ref 3.2–4.9)
ALBUMIN/GLOB SERPL: 1.2 G/DL
ALP SERPL-CCNC: 90 U/L (ref 30–99)
ALT SERPL-CCNC: 29 U/L (ref 2–50)
ANION GAP SERPL CALC-SCNC: 15 MMOL/L (ref 0–11.9)
ANISOCYTOSIS BLD QL SMEAR: ABNORMAL
APPEARANCE UR: CLEAR
APTT PPP: 27.2 SEC (ref 24.7–36)
AST SERPL-CCNC: 50 U/L (ref 12–45)
BACTERIA #/AREA URNS HPF: ABNORMAL /HPF
BARCODED ABORH UBTYP: 6200
BARCODED ABORH UBTYP: 6200
BARCODED PRD CODE UBPRD: NORMAL
BARCODED PRD CODE UBPRD: NORMAL
BARCODED UNIT NUM UBUNT: NORMAL
BARCODED UNIT NUM UBUNT: NORMAL
BASOPHILS # BLD AUTO: 0 % (ref 0–1.8)
BASOPHILS # BLD: 0 K/UL (ref 0–0.12)
BILIRUB SERPL-MCNC: 0.8 MG/DL (ref 0.1–1.5)
BILIRUB UR QL STRIP.AUTO: NEGATIVE
BLD GP AB SCN SERPL QL: NORMAL
BNP SERPL-MCNC: 89 PG/ML (ref 0–100)
BUN SERPL-MCNC: 31 MG/DL (ref 8–22)
CALCIUM SERPL-MCNC: 9.8 MG/DL (ref 8.5–10.5)
CHLORIDE SERPL-SCNC: 110 MMOL/L (ref 96–112)
CK SERPL-CCNC: 972 U/L (ref 0–154)
CO2 SERPL-SCNC: 20 MMOL/L (ref 20–33)
COLOR UR: YELLOW
COMPONENT R 8504R: NORMAL
COMPONENT R 8504R: NORMAL
CREAT SERPL-MCNC: 0.98 MG/DL (ref 0.5–1.4)
EOSINOPHIL # BLD AUTO: 0 K/UL (ref 0–0.51)
EOSINOPHIL NFR BLD: 0 % (ref 0–6.9)
EPI CELLS #/AREA URNS HPF: ABNORMAL /HPF
ERYTHROCYTE [DISTWIDTH] IN BLOOD BY AUTOMATED COUNT: 47.2 FL (ref 35.9–50)
GLOBULIN SER CALC-MCNC: 3.3 G/DL (ref 1.9–3.5)
GLUCOSE SERPL-MCNC: 159 MG/DL (ref 65–99)
GLUCOSE UR STRIP.AUTO-MCNC: NEGATIVE MG/DL
HCT VFR BLD AUTO: 24.4 % (ref 37–47)
HGB BLD-MCNC: 7.2 G/DL (ref 12–16)
HGB RETIC QN AUTO: 18.2 PG/CELL (ref 29–35)
HYALINE CASTS #/AREA URNS LPF: ABNORMAL /LPF
HYPOCHROMIA BLD QL SMEAR: ABNORMAL
IMM RETICS NFR: 25.3 % (ref 9.3–17.4)
INR PPP: 1.23 (ref 0.87–1.13)
IRON SATN MFR SERPL: ABNORMAL % (ref 15–55)
IRON SERPL-MCNC: <10 UG/DL (ref 40–170)
KETONES UR STRIP.AUTO-MCNC: ABNORMAL MG/DL
LEUKOCYTE ESTERASE UR QL STRIP.AUTO: NEGATIVE
LIPASE SERPL-CCNC: 15 U/L (ref 11–82)
LYMPHOCYTES # BLD AUTO: 1.15 K/UL (ref 1–4.8)
LYMPHOCYTES NFR BLD: 6.9 % (ref 22–41)
MANUAL DIFF BLD: NORMAL
MCH RBC QN AUTO: 20.9 PG (ref 27–33)
MCHC RBC AUTO-ENTMCNC: 29.5 G/DL (ref 33.6–35)
MCV RBC AUTO: 70.9 FL (ref 81.4–97.8)
MICRO URNS: ABNORMAL
MICROCYTES BLD QL SMEAR: ABNORMAL
MONOCYTES # BLD AUTO: 1.01 K/UL (ref 0–0.85)
MONOCYTES NFR BLD AUTO: 6.1 % (ref 0–13.4)
MORPHOLOGY BLD-IMP: NORMAL
NEUTROPHILS # BLD AUTO: 14.44 K/UL (ref 2–7.15)
NEUTROPHILS NFR BLD: 87 % (ref 44–72)
NITRITE UR QL STRIP.AUTO: NEGATIVE
NRBC # BLD AUTO: 0 K/UL
NRBC BLD-RTO: 0 /100 WBC
PH UR STRIP.AUTO: 5 [PH]
PLATELET # BLD AUTO: 565 K/UL (ref 164–446)
PLATELET BLD QL SMEAR: NORMAL
PMV BLD AUTO: 9.5 FL (ref 9–12.9)
POTASSIUM SERPL-SCNC: 3.3 MMOL/L (ref 3.6–5.5)
PRODUCT TYPE UPROD: NORMAL
PRODUCT TYPE UPROD: NORMAL
PROT SERPL-MCNC: 7.4 G/DL (ref 6–8.2)
PROT UR QL STRIP: 100 MG/DL
PROTHROMBIN TIME: 15.6 SEC (ref 12–14.6)
RBC # BLD AUTO: 3.44 M/UL (ref 4.2–5.4)
RBC # URNS HPF: ABNORMAL /HPF
RBC BLD AUTO: PRESENT
RBC UR QL AUTO: ABNORMAL
RETICS # AUTO: 0.04 M/UL (ref 0.04–0.06)
RETICS/RBC NFR: 1.3 % (ref 0.8–2.1)
RH BLD: NORMAL
RH BLD: NORMAL
SODIUM SERPL-SCNC: 145 MMOL/L (ref 135–145)
SP GR UR STRIP.AUTO: 1.02
TIBC SERPL-MCNC: 462 UG/DL (ref 250–450)
TROPONIN I SERPL-MCNC: 0.05 NG/ML (ref 0–0.04)
UNIT STATUS USTAT: NORMAL
UNIT STATUS USTAT: NORMAL
UROBILINOGEN UR STRIP.AUTO-MCNC: 0.2 MG/DL
WBC # BLD AUTO: 16.6 K/UL (ref 4.8–10.8)
WBC #/AREA URNS HPF: ABNORMAL /HPF

## 2018-10-09 PROCEDURE — 81001 URINALYSIS AUTO W/SCOPE: CPT

## 2018-10-09 PROCEDURE — 96374 THER/PROPH/DIAG INJ IV PUSH: CPT

## 2018-10-09 PROCEDURE — 83540 ASSAY OF IRON: CPT

## 2018-10-09 PROCEDURE — 86850 RBC ANTIBODY SCREEN: CPT

## 2018-10-09 PROCEDURE — 36415 COLL VENOUS BLD VENIPUNCTURE: CPT

## 2018-10-09 PROCEDURE — 84484 ASSAY OF TROPONIN QUANT: CPT

## 2018-10-09 PROCEDURE — 84443 ASSAY THYROID STIM HORMONE: CPT

## 2018-10-09 PROCEDURE — 86900 BLOOD TYPING SEROLOGIC ABO: CPT

## 2018-10-09 PROCEDURE — 82728 ASSAY OF FERRITIN: CPT

## 2018-10-09 PROCEDURE — 71045 X-RAY EXAM CHEST 1 VIEW: CPT

## 2018-10-09 PROCEDURE — 80053 COMPREHEN METABOLIC PANEL: CPT

## 2018-10-09 PROCEDURE — 85730 THROMBOPLASTIN TIME PARTIAL: CPT

## 2018-10-09 PROCEDURE — 51702 INSERT TEMP BLADDER CATH: CPT

## 2018-10-09 PROCEDURE — 83880 ASSAY OF NATRIURETIC PEPTIDE: CPT

## 2018-10-09 PROCEDURE — 94760 N-INVAS EAR/PLS OXIMETRY 1: CPT

## 2018-10-09 PROCEDURE — 700105 HCHG RX REV CODE 258: Performed by: EMERGENCY MEDICINE

## 2018-10-09 PROCEDURE — 83036 HEMOGLOBIN GLYCOSYLATED A1C: CPT

## 2018-10-09 PROCEDURE — 85046 RETICYTE/HGB CONCENTRATE: CPT

## 2018-10-09 PROCEDURE — 85027 COMPLETE CBC AUTOMATED: CPT

## 2018-10-09 PROCEDURE — 73552 X-RAY EXAM OF FEMUR 2/>: CPT | Mod: RT

## 2018-10-09 PROCEDURE — 770006 HCHG ROOM/CARE - MED/SURG/GYN SEMI*

## 2018-10-09 PROCEDURE — 86901 BLOOD TYPING SEROLOGIC RH(D): CPT

## 2018-10-09 PROCEDURE — 96375 TX/PRO/DX INJ NEW DRUG ADDON: CPT

## 2018-10-09 PROCEDURE — 82550 ASSAY OF CK (CPK): CPT

## 2018-10-09 PROCEDURE — 303105 HCHG CATHETER EXTRA

## 2018-10-09 PROCEDURE — 72170 X-RAY EXAM OF PELVIS: CPT

## 2018-10-09 PROCEDURE — 82746 ASSAY OF FOLIC ACID SERUM: CPT

## 2018-10-09 PROCEDURE — 85610 PROTHROMBIN TIME: CPT

## 2018-10-09 PROCEDURE — 99285 EMERGENCY DEPT VISIT HI MDM: CPT

## 2018-10-09 PROCEDURE — 700111 HCHG RX REV CODE 636 W/ 250 OVERRIDE (IP): Performed by: EMERGENCY MEDICINE

## 2018-10-09 PROCEDURE — 85007 BL SMEAR W/DIFF WBC COUNT: CPT

## 2018-10-09 PROCEDURE — 82607 VITAMIN B-12: CPT

## 2018-10-09 PROCEDURE — 93005 ELECTROCARDIOGRAM TRACING: CPT | Performed by: EMERGENCY MEDICINE

## 2018-10-09 PROCEDURE — 83690 ASSAY OF LIPASE: CPT

## 2018-10-09 PROCEDURE — 83550 IRON BINDING TEST: CPT

## 2018-10-09 PROCEDURE — 99222 1ST HOSP IP/OBS MODERATE 55: CPT | Mod: AI | Performed by: HOSPITALIST

## 2018-10-09 RX ORDER — HYDROMORPHONE HYDROCHLORIDE 2 MG/ML
0.5 INJECTION, SOLUTION INTRAMUSCULAR; INTRAVENOUS; SUBCUTANEOUS
Status: DISCONTINUED | OUTPATIENT
Start: 2018-10-09 | End: 2018-10-15 | Stop reason: HOSPADM

## 2018-10-09 RX ORDER — MORPHINE SULFATE 4 MG/ML
4 INJECTION, SOLUTION INTRAMUSCULAR; INTRAVENOUS ONCE
Status: COMPLETED | OUTPATIENT
Start: 2018-10-09 | End: 2018-10-09

## 2018-10-09 RX ORDER — POLYETHYLENE GLYCOL 3350 17 G/17G
1 POWDER, FOR SOLUTION ORAL
Status: DISCONTINUED | OUTPATIENT
Start: 2018-10-09 | End: 2018-10-15 | Stop reason: HOSPADM

## 2018-10-09 RX ORDER — OXYCODONE HYDROCHLORIDE 10 MG/1
10 TABLET ORAL
Status: DISCONTINUED | OUTPATIENT
Start: 2018-10-09 | End: 2018-10-15 | Stop reason: HOSPADM

## 2018-10-09 RX ORDER — ONDANSETRON 2 MG/ML
4 INJECTION INTRAMUSCULAR; INTRAVENOUS EVERY 4 HOURS PRN
Status: DISCONTINUED | OUTPATIENT
Start: 2018-10-09 | End: 2018-10-15 | Stop reason: HOSPADM

## 2018-10-09 RX ORDER — AMLODIPINE BESYLATE 10 MG/1
10 TABLET ORAL DAILY
Status: DISCONTINUED | OUTPATIENT
Start: 2018-10-10 | End: 2018-10-12

## 2018-10-09 RX ORDER — ONDANSETRON 4 MG/1
4 TABLET, ORALLY DISINTEGRATING ORAL EVERY 4 HOURS PRN
Status: DISCONTINUED | OUTPATIENT
Start: 2018-10-09 | End: 2018-10-15 | Stop reason: HOSPADM

## 2018-10-09 RX ORDER — SODIUM CHLORIDE 9 MG/ML
INJECTION, SOLUTION INTRAVENOUS CONTINUOUS
Status: DISCONTINUED | OUTPATIENT
Start: 2018-10-09 | End: 2018-10-10

## 2018-10-09 RX ORDER — OXYCODONE HYDROCHLORIDE 5 MG/1
5 TABLET ORAL
Status: DISCONTINUED | OUTPATIENT
Start: 2018-10-09 | End: 2018-10-15 | Stop reason: HOSPADM

## 2018-10-09 RX ORDER — CITALOPRAM 20 MG/1
20 TABLET ORAL DAILY
COMMUNITY
End: 2019-05-07

## 2018-10-09 RX ORDER — SODIUM CHLORIDE 9 MG/ML
1000 INJECTION, SOLUTION INTRAVENOUS ONCE
Status: COMPLETED | OUTPATIENT
Start: 2018-10-09 | End: 2018-10-09

## 2018-10-09 RX ORDER — AMOXICILLIN 250 MG
2 CAPSULE ORAL 2 TIMES DAILY
Status: DISCONTINUED | OUTPATIENT
Start: 2018-10-10 | End: 2018-10-15 | Stop reason: HOSPADM

## 2018-10-09 RX ORDER — BISACODYL 10 MG
10 SUPPOSITORY, RECTAL RECTAL
Status: DISCONTINUED | OUTPATIENT
Start: 2018-10-09 | End: 2018-10-15 | Stop reason: HOSPADM

## 2018-10-09 RX ORDER — ROSUVASTATIN CALCIUM 20 MG/1
40 TABLET, COATED ORAL EVERY EVENING
Status: DISCONTINUED | OUTPATIENT
Start: 2018-10-09 | End: 2018-10-15 | Stop reason: HOSPADM

## 2018-10-09 RX ADMIN — SODIUM CHLORIDE 1000 ML: 9 INJECTION, SOLUTION INTRAVENOUS at 19:06

## 2018-10-09 RX ADMIN — MORPHINE SULFATE 4 MG: 4 INJECTION INTRAVENOUS at 21:30

## 2018-10-09 RX ADMIN — FENTANYL CITRATE 100 MCG: 50 INJECTION INTRAMUSCULAR; INTRAVENOUS at 19:44

## 2018-10-09 ASSESSMENT — PAIN SCALES - GENERAL: PAINLEVEL_OUTOF10: 1

## 2018-10-09 ASSESSMENT — ENCOUNTER SYMPTOMS
HALLUCINATIONS: 0
CHILLS: 0
SHORTNESS OF BREATH: 0
HEARTBURN: 0
FALLS: 1
BLURRED VISION: 0
NAUSEA: 0
COUGH: 0
SENSORY CHANGE: 0
LOSS OF CONSCIOUSNESS: 0
DOUBLE VISION: 0
MYALGIAS: 0
PALPITATIONS: 0
VOMITING: 0
EYE DISCHARGE: 0
HEADACHES: 0
HEMOPTYSIS: 0
FEVER: 0
DEPRESSION: 0
BRUISES/BLEEDS EASILY: 0
WEAKNESS: 0
DIZZINESS: 0
ABDOMINAL PAIN: 0
FLANK PAIN: 0
SPEECH CHANGE: 0
FOCAL WEAKNESS: 0

## 2018-10-09 ASSESSMENT — LIFESTYLE VARIABLES
SUBSTANCE_ABUSE: 0
DO YOU DRINK ALCOHOL: NO

## 2018-10-10 ENCOUNTER — APPOINTMENT (OUTPATIENT)
Dept: RADIOLOGY | Facility: MEDICAL CENTER | Age: 83
DRG: 481 | End: 2018-10-10
Attending: ORTHOPAEDIC SURGERY
Payer: MEDICARE

## 2018-10-10 ENCOUNTER — APPOINTMENT (OUTPATIENT)
Dept: RADIOLOGY | Facility: MEDICAL CENTER | Age: 83
DRG: 481 | End: 2018-10-10
Attending: EMERGENCY MEDICINE
Payer: MEDICARE

## 2018-10-10 PROBLEM — R79.89 ABNORMAL LFTS: Status: RESOLVED | Noted: 2018-10-09 | Resolved: 2018-10-10

## 2018-10-10 LAB
ALBUMIN SERPL BCP-MCNC: 3.5 G/DL (ref 3.2–4.9)
ALBUMIN/GLOB SERPL: 1.3 G/DL
ALP SERPL-CCNC: 75 U/L (ref 30–99)
ALT SERPL-CCNC: 25 U/L (ref 2–50)
ANION GAP SERPL CALC-SCNC: 12 MMOL/L (ref 0–11.9)
AST SERPL-CCNC: 41 U/L (ref 12–45)
BASOPHILS # BLD AUTO: 0.6 % (ref 0–1.8)
BASOPHILS # BLD: 0.07 K/UL (ref 0–0.12)
BILIRUB SERPL-MCNC: 0.7 MG/DL (ref 0.1–1.5)
BUN SERPL-MCNC: 32 MG/DL (ref 8–22)
CALCIUM SERPL-MCNC: 9.1 MG/DL (ref 8.5–10.5)
CHLORIDE SERPL-SCNC: 115 MMOL/L (ref 96–112)
CHOLEST SERPL-MCNC: 134 MG/DL (ref 100–199)
CK SERPL-CCNC: 788 U/L (ref 0–154)
CO2 SERPL-SCNC: 22 MMOL/L (ref 20–33)
CREAT SERPL-MCNC: 0.88 MG/DL (ref 0.5–1.4)
EOSINOPHIL # BLD AUTO: 0.02 K/UL (ref 0–0.51)
EOSINOPHIL NFR BLD: 0.2 % (ref 0–6.9)
ERYTHROCYTE [DISTWIDTH] IN BLOOD BY AUTOMATED COUNT: 48.9 FL (ref 35.9–50)
EST. AVERAGE GLUCOSE BLD GHB EST-MCNC: 128 MG/DL
FERRITIN SERPL-MCNC: 10.5 NG/ML (ref 10–291)
FOLATE SERPL-MCNC: >24 NG/ML
GLOBULIN SER CALC-MCNC: 2.8 G/DL (ref 1.9–3.5)
GLUCOSE SERPL-MCNC: 123 MG/DL (ref 65–99)
HBA1C MFR BLD: 6.1 % (ref 0–5.6)
HCT VFR BLD AUTO: 21.6 % (ref 37–47)
HCT VFR BLD AUTO: 23.6 % (ref 37–47)
HDLC SERPL-MCNC: 80 MG/DL
HGB BLD-MCNC: 6 G/DL (ref 12–16)
HGB BLD-MCNC: 7 G/DL (ref 12–16)
IMM GRANULOCYTES # BLD AUTO: 0.08 K/UL (ref 0–0.11)
IMM GRANULOCYTES NFR BLD AUTO: 0.6 % (ref 0–0.9)
LDLC SERPL CALC-MCNC: 39 MG/DL
LYMPHOCYTES # BLD AUTO: 1.08 K/UL (ref 1–4.8)
LYMPHOCYTES NFR BLD: 8.7 % (ref 22–41)
MCH RBC QN AUTO: 20 PG (ref 27–33)
MCHC RBC AUTO-ENTMCNC: 27.8 G/DL (ref 33.6–35)
MCV RBC AUTO: 72 FL (ref 81.4–97.8)
MONOCYTES # BLD AUTO: 1.45 K/UL (ref 0–0.85)
MONOCYTES NFR BLD AUTO: 11.7 % (ref 0–13.4)
NEUTROPHILS # BLD AUTO: 9.7 K/UL (ref 2–7.15)
NEUTROPHILS NFR BLD: 78.2 % (ref 44–72)
NRBC # BLD AUTO: 0 K/UL
NRBC BLD-RTO: 0 /100 WBC
PLATELET # BLD AUTO: 439 K/UL (ref 164–446)
PMV BLD AUTO: 8.9 FL (ref 9–12.9)
POTASSIUM SERPL-SCNC: 3.3 MMOL/L (ref 3.6–5.5)
PROT SERPL-MCNC: 6.3 G/DL (ref 6–8.2)
RBC # BLD AUTO: 3 M/UL (ref 4.2–5.4)
SODIUM SERPL-SCNC: 149 MMOL/L (ref 135–145)
TRIGL SERPL-MCNC: 75 MG/DL (ref 0–149)
TSH SERPL DL<=0.005 MIU/L-ACNC: 2.61 UIU/ML (ref 0.38–5.33)
VIT B12 SERPL-MCNC: 525 PG/ML (ref 211–911)
WBC # BLD AUTO: 12.4 K/UL (ref 4.8–10.8)

## 2018-10-10 PROCEDURE — 700111 HCHG RX REV CODE 636 W/ 250 OVERRIDE (IP)

## 2018-10-10 PROCEDURE — 85018 HEMOGLOBIN: CPT

## 2018-10-10 PROCEDURE — BQ101ZZ FLUOROSCOPY OF RIGHT HIP USING LOW OSMOLAR CONTRAST: ICD-10-PCS | Performed by: ORTHOPAEDIC SURGERY

## 2018-10-10 PROCEDURE — 30233N1 TRANSFUSION OF NONAUTOLOGOUS RED BLOOD CELLS INTO PERIPHERAL VEIN, PERCUTANEOUS APPROACH: ICD-10-PCS | Performed by: INTERNAL MEDICINE

## 2018-10-10 PROCEDURE — 93971 EXTREMITY STUDY: CPT | Mod: RT

## 2018-10-10 PROCEDURE — 0QS606Z REPOSITION RIGHT UPPER FEMUR WITH INTRAMEDULLARY INTERNAL FIXATION DEVICE, OPEN APPROACH: ICD-10-PCS | Performed by: ORTHOPAEDIC SURGERY

## 2018-10-10 PROCEDURE — 73502 X-RAY EXAM HIP UNI 2-3 VIEWS: CPT | Mod: RT

## 2018-10-10 PROCEDURE — 700105 HCHG RX REV CODE 258: Performed by: HOSPITALIST

## 2018-10-10 PROCEDURE — 80053 COMPREHEN METABOLIC PANEL: CPT

## 2018-10-10 PROCEDURE — 160041 HCHG SURGERY MINUTES - EA ADDL 1 MIN LEVEL 4: Performed by: ORTHOPAEDIC SURGERY

## 2018-10-10 PROCEDURE — 36415 COLL VENOUS BLD VENIPUNCTURE: CPT

## 2018-10-10 PROCEDURE — 36430 TRANSFUSION BLD/BLD COMPNT: CPT

## 2018-10-10 PROCEDURE — 700102 HCHG RX REV CODE 250 W/ 637 OVERRIDE(OP): Performed by: HOSPITALIST

## 2018-10-10 PROCEDURE — 99232 SBSQ HOSP IP/OBS MODERATE 35: CPT | Performed by: INTERNAL MEDICINE

## 2018-10-10 PROCEDURE — 85025 COMPLETE CBC W/AUTO DIFF WBC: CPT

## 2018-10-10 PROCEDURE — 700101 HCHG RX REV CODE 250: Performed by: INTERNAL MEDICINE

## 2018-10-10 PROCEDURE — 501838 HCHG SUTURE GENERAL: Performed by: ORTHOPAEDIC SURGERY

## 2018-10-10 PROCEDURE — 80061 LIPID PANEL: CPT

## 2018-10-10 PROCEDURE — 302128 INFUSION PUMP: Performed by: ORTHOPAEDIC SURGERY

## 2018-10-10 PROCEDURE — 700111 HCHG RX REV CODE 636 W/ 250 OVERRIDE (IP): Performed by: HOSPITALIST

## 2018-10-10 PROCEDURE — 160029 HCHG SURGERY MINUTES - 1ST 30 MINS LEVEL 4: Performed by: ORTHOPAEDIC SURGERY

## 2018-10-10 PROCEDURE — 500891 HCHG PACK, ORTHO MAJOR: Performed by: ORTHOPAEDIC SURGERY

## 2018-10-10 PROCEDURE — 700105 HCHG RX REV CODE 258

## 2018-10-10 PROCEDURE — 160009 HCHG ANES TIME/MIN: Performed by: ORTHOPAEDIC SURGERY

## 2018-10-10 PROCEDURE — 302135 SEQUENTIAL COMPRESSION MACHINE: Performed by: ORTHOPAEDIC SURGERY

## 2018-10-10 PROCEDURE — 160048 HCHG OR STATISTICAL LEVEL 1-5: Performed by: ORTHOPAEDIC SURGERY

## 2018-10-10 PROCEDURE — 82550 ASSAY OF CK (CPK): CPT

## 2018-10-10 PROCEDURE — 160036 HCHG PACU - EA ADDL 30 MINS PHASE I: Performed by: ORTHOPAEDIC SURGERY

## 2018-10-10 PROCEDURE — 85014 HEMATOCRIT: CPT

## 2018-10-10 PROCEDURE — 160002 HCHG RECOVERY MINUTES (STAT): Performed by: ORTHOPAEDIC SURGERY

## 2018-10-10 PROCEDURE — C1713 ANCHOR/SCREW BN/BN,TIS/BN: HCPCS | Performed by: ORTHOPAEDIC SURGERY

## 2018-10-10 PROCEDURE — 700101 HCHG RX REV CODE 250: Mod: JW

## 2018-10-10 PROCEDURE — 770006 HCHG ROOM/CARE - MED/SURG/GYN SEMI*

## 2018-10-10 PROCEDURE — 160035 HCHG PACU - 1ST 60 MINS PHASE I: Performed by: ORTHOPAEDIC SURGERY

## 2018-10-10 PROCEDURE — A9270 NON-COVERED ITEM OR SERVICE: HCPCS | Performed by: HOSPITALIST

## 2018-10-10 PROCEDURE — P9016 RBC LEUKOCYTES REDUCED: HCPCS | Mod: 91

## 2018-10-10 PROCEDURE — 86923 COMPATIBILITY TEST ELECTRIC: CPT | Mod: 91

## 2018-10-10 DEVICE — SCREW CROSS LOCK 5MM X 32.5MM (4TX5=20): Type: IMPLANTABLE DEVICE | Site: HIP | Status: FUNCTIONAL

## 2018-10-10 DEVICE — SCREW LAG 10.5MM X 85MM (4TX2=8): Type: IMPLANTABLE DEVICE | Site: HIP | Status: FUNCTIONAL

## 2018-10-10 DEVICE — NAIL HIP 127.5 DEGREE 10MM X 180MM (4TX2=8): Type: IMPLANTABLE DEVICE | Site: HIP | Status: FUNCTIONAL

## 2018-10-10 RX ORDER — MORPHINE SULFATE 4 MG/ML
1 INJECTION, SOLUTION INTRAMUSCULAR; INTRAVENOUS
Status: DISCONTINUED | OUTPATIENT
Start: 2018-10-10 | End: 2018-10-10 | Stop reason: HOSPADM

## 2018-10-10 RX ORDER — OXYCODONE HCL 5 MG/5 ML
5 SOLUTION, ORAL ORAL
Status: DISCONTINUED | OUTPATIENT
Start: 2018-10-10 | End: 2018-10-10 | Stop reason: HOSPADM

## 2018-10-10 RX ORDER — ONDANSETRON 2 MG/ML
4 INJECTION INTRAMUSCULAR; INTRAVENOUS
Status: DISCONTINUED | OUTPATIENT
Start: 2018-10-10 | End: 2018-10-10 | Stop reason: HOSPADM

## 2018-10-10 RX ORDER — OXYCODONE HYDROCHLORIDE 5 MG/1
10 TABLET ORAL
Status: DISCONTINUED | OUTPATIENT
Start: 2018-10-10 | End: 2018-10-10 | Stop reason: ALTCHOICE

## 2018-10-10 RX ORDER — SODIUM CHLORIDE 9 MG/ML
INJECTION, SOLUTION INTRAVENOUS
Status: COMPLETED
Start: 2018-10-10 | End: 2018-10-10

## 2018-10-10 RX ORDER — MEPERIDINE HYDROCHLORIDE 25 MG/ML
6.25 INJECTION INTRAMUSCULAR; INTRAVENOUS; SUBCUTANEOUS
Status: DISCONTINUED | OUTPATIENT
Start: 2018-10-10 | End: 2018-10-10 | Stop reason: HOSPADM

## 2018-10-10 RX ORDER — MORPHINE SULFATE 4 MG/ML
2 INJECTION, SOLUTION INTRAMUSCULAR; INTRAVENOUS
Status: DISCONTINUED | OUTPATIENT
Start: 2018-10-10 | End: 2018-10-10 | Stop reason: HOSPADM

## 2018-10-10 RX ORDER — DEXTROSE MONOHYDRATE, SODIUM CHLORIDE, AND POTASSIUM CHLORIDE 50; 1.49; 4.5 G/1000ML; G/1000ML; G/1000ML
INJECTION, SOLUTION INTRAVENOUS CONTINUOUS
Status: DISCONTINUED | OUTPATIENT
Start: 2018-10-10 | End: 2018-10-13

## 2018-10-10 RX ORDER — SODIUM CHLORIDE, SODIUM LACTATE, POTASSIUM CHLORIDE, CALCIUM CHLORIDE 600; 310; 30; 20 MG/100ML; MG/100ML; MG/100ML; MG/100ML
INJECTION, SOLUTION INTRAVENOUS CONTINUOUS
Status: DISCONTINUED | OUTPATIENT
Start: 2018-10-10 | End: 2018-10-10 | Stop reason: HOSPADM

## 2018-10-10 RX ORDER — MAGNESIUM HYDROXIDE 1200 MG/15ML
LIQUID ORAL
Status: COMPLETED | OUTPATIENT
Start: 2018-10-10 | End: 2018-10-10

## 2018-10-10 RX ORDER — MIDAZOLAM HYDROCHLORIDE 1 MG/ML
INJECTION INTRAMUSCULAR; INTRAVENOUS
Status: DISPENSED
Start: 2018-10-10 | End: 2018-10-10

## 2018-10-10 RX ORDER — HALOPERIDOL 5 MG/ML
1 INJECTION INTRAMUSCULAR
Status: DISCONTINUED | OUTPATIENT
Start: 2018-10-10 | End: 2018-10-10 | Stop reason: HOSPADM

## 2018-10-10 RX ORDER — MORPHINE SULFATE 10 MG/ML
5 INJECTION, SOLUTION INTRAMUSCULAR; INTRAVENOUS
Status: DISCONTINUED | OUTPATIENT
Start: 2018-10-10 | End: 2018-10-10 | Stop reason: HOSPADM

## 2018-10-10 RX ORDER — OXYCODONE HYDROCHLORIDE 5 MG/1
5 TABLET ORAL
Status: DISCONTINUED | OUTPATIENT
Start: 2018-10-10 | End: 2018-10-10 | Stop reason: HOSPADM

## 2018-10-10 RX ORDER — CITALOPRAM 20 MG/1
20 TABLET ORAL DAILY
Status: DISCONTINUED | OUTPATIENT
Start: 2018-10-11 | End: 2018-10-15 | Stop reason: HOSPADM

## 2018-10-10 RX ORDER — OXYCODONE HYDROCHLORIDE 5 MG/1
10 TABLET ORAL
Status: DISCONTINUED | OUTPATIENT
Start: 2018-10-10 | End: 2018-10-10 | Stop reason: HOSPADM

## 2018-10-10 RX ORDER — OXYCODONE HCL 5 MG/5 ML
10 SOLUTION, ORAL ORAL
Status: DISCONTINUED | OUTPATIENT
Start: 2018-10-10 | End: 2018-10-10 | Stop reason: HOSPADM

## 2018-10-10 RX ADMIN — SODIUM CHLORIDE: 9 INJECTION, SOLUTION INTRAVENOUS at 08:45

## 2018-10-10 RX ADMIN — ROSUVASTATIN CALCIUM 40 MG: 20 TABLET, FILM COATED ORAL at 18:09

## 2018-10-10 RX ADMIN — HYDROMORPHONE HYDROCHLORIDE 0.5 MG: 2 INJECTION INTRAMUSCULAR; INTRAVENOUS; SUBCUTANEOUS at 01:51

## 2018-10-10 RX ADMIN — SODIUM CHLORIDE: 9 INJECTION, SOLUTION INTRAVENOUS at 18:15

## 2018-10-10 RX ADMIN — SODIUM CHLORIDE: 9 INJECTION, SOLUTION INTRAVENOUS at 04:50

## 2018-10-10 RX ADMIN — AMLODIPINE BESYLATE 10 MG: 10 TABLET ORAL at 04:50

## 2018-10-10 RX ADMIN — SENNOSIDES AND DOCUSATE SODIUM 2 TABLET: 8.6; 5 TABLET ORAL at 18:09

## 2018-10-10 RX ADMIN — POTASSIUM CHLORIDE, DEXTROSE MONOHYDRATE AND SODIUM CHLORIDE: 150; 5; 450 INJECTION, SOLUTION INTRAVENOUS at 13:27

## 2018-10-10 ASSESSMENT — COGNITIVE AND FUNCTIONAL STATUS - GENERAL
TURNING FROM BACK TO SIDE WHILE IN FLAT BAD: A LOT
CLIMB 3 TO 5 STEPS WITH RAILING: TOTAL
TOILETING: A LOT
MOVING TO AND FROM BED TO CHAIR: UNABLE
MOBILITY SCORE: 7
MOVING FROM LYING ON BACK TO SITTING ON SIDE OF FLAT BED: UNABLE
DRESSING REGULAR UPPER BODY CLOTHING: A LOT
EATING MEALS: A LITTLE
PERSONAL GROOMING: A LOT
STANDING UP FROM CHAIR USING ARMS: TOTAL
SUGGESTED CMS G CODE MODIFIER MOBILITY: CM
SUGGESTED CMS G CODE MODIFIER DAILY ACTIVITY: CL
HELP NEEDED FOR BATHING: A LOT
WALKING IN HOSPITAL ROOM: TOTAL
DRESSING REGULAR LOWER BODY CLOTHING: TOTAL
DAILY ACTIVITIY SCORE: 12

## 2018-10-10 ASSESSMENT — ENCOUNTER SYMPTOMS
FEVER: 0
DEPRESSION: 0
NAUSEA: 0
DIZZINESS: 0
BLURRED VISION: 0
HEARTBURN: 0
SHORTNESS OF BREATH: 0
COUGH: 0
ABDOMINAL PAIN: 0
BLOOD IN STOOL: 0
VOMITING: 0

## 2018-10-10 ASSESSMENT — PAIN SCALES - GENERAL
PAINLEVEL_OUTOF10: 0
PAINLEVEL_OUTOF10: 2
PAINLEVEL_OUTOF10: 2
PAINLEVEL_OUTOF10: 0
PAINLEVEL_OUTOF10: 2
PAINLEVEL_OUTOF10: 0
PAINLEVEL_OUTOF10: 0
PAINLEVEL_OUTOF10: 2
PAINLEVEL_OUTOF10: 7
PAINLEVEL_OUTOF10: 0

## 2018-10-10 ASSESSMENT — PATIENT HEALTH QUESTIONNAIRE - PHQ9
SUM OF ALL RESPONSES TO PHQ9 QUESTIONS 1 AND 2: 0
1. LITTLE INTEREST OR PLEASURE IN DOING THINGS: NOT AT ALL
2. FEELING DOWN, DEPRESSED, IRRITABLE, OR HOPELESS: NOT AT ALL

## 2018-10-10 ASSESSMENT — LIFESTYLE VARIABLES: EVER_SMOKED: NEVER

## 2018-10-10 NOTE — PROGRESS NOTES
Seen and examined, assuming care for Dr. Almonte.  Right intertrochanteric femur fracture.  Low Hgb on admission, transfusing.    Blood pressure 144/46, pulse (!) 105, temperature 38 °C (100.4 °F), resp. rate 15, height 1.524 m (5'), weight 45.4 kg (100 lb), SpO2 96 %, not currently breastfeeding.      Exam:  RLE:  Shortened, pain with movement, intact light touch sensation in the right foot, 2+ DP, +DF/PF, skin ok.    #1 Right intertrochanteric femur fracture    Plan:  - To OR today for hip nail  - NPO  - WBAT post-op

## 2018-10-10 NOTE — CARE PLAN
Problem: Safety  Goal: Will remain free from falls  Outcome: PROGRESSING AS EXPECTED  Bed alarm in place. Pt educated on need to call before getting out of bed. Pt verbalizes understanding. Treaded socks on pt. Bed locked. DME in bathroom. Room safety check. Bed in lowest position. Pt calls for help appropriately on call light. Call light with in reach. Hourly rounding in place.        Problem: Knowledge Deficit  Goal: Knowledge of disease process/condition, treatment plan, diagnostic tests, and medications will improve  Outcome: PROGRESSING AS EXPECTED  Reviewed POC including safety, mobility, pain management, medication administration, DVT prophylaxis, and discharge. Pt verbalizes understanding.

## 2018-10-10 NOTE — THERAPY
PT contact note:     PT consult received, pt scheduled for sx this afternoon. Will see post op as appropriate.    Shelbie Lozano. PT, DPT. / [place]. [phone]

## 2018-10-10 NOTE — PROGRESS NOTES
Pt off the unit with transport for surgery.  1 unit of RBC left with pt, about 20ml left. Ok per OR RN and anesthesiologist.  Will order labs once pt is back on unit.

## 2018-10-10 NOTE — ASSESSMENT & PLAN NOTE
With severe iron deficiency; patient denies known GI/internal bleed however.  guiac negative here.  H/H a little better today; on FeSo4  Hold ASA; empiric PPI; will need eventual GI evaluation, likely as outpatient unless has active bleeding here.

## 2018-10-10 NOTE — ED PROVIDER NOTES
ED Provider Note    Scribed for Dominique Baldwin M.D. by Lindy Tripp. 10/9/2018, 6:44 PM.    Primary care provider: Duane Patel D.O.  Means of arrival: Ambulance  History obtained from: Patient  History limited by: None    CHIEF COMPLAINT  Chief Complaint   Patient presents with   • Hip Injury     R hip     HPI  Rebecca Bishop is a 83 y.o. female who presents to the Emergency Department brought in by ambulance from home with right hip pain onset yesterday morning. Per nursing note, EMS reports the patient has been on the ground since yesterday morning secondary to GLF and was found by her daughter.  Patient states that she slipped out of bed and landed on her right hip and was unable to get up.  She did not hit her head or lose consciousness.  She reports throbbing right-sided hip pain that is moderate in severity.  She is given fentanyl by EMS with some improvement in her pain.  Confirms a past medical history of hypertension and hyperlipidemia. Denies a past cardiac history.  Denies numbness, tingling, shortness of breath and chest pain.  She denies any bleeding rectally or hematemesis.    REVIEW OF SYSTEMS  Review of Systems   Constitutional: Negative for fever.   Respiratory: Negative for shortness of breath.    Cardiovascular: Negative for chest pain.   Gastrointestinal: Negative for abdominal pain, nausea and vomiting.   Musculoskeletal: Positive for falls.        Positive for hip pain   Neurological: Negative for loss of consciousness and headaches.   All other systems reviewed and are negative.      PAST MEDICAL HISTORY   has a past medical history of Hypertension; Stroke (HCC); and TIA (transient ischemic attack) (1/2015).    SURGICAL HISTORY   has a past surgical history that includes ankle orif (Right, 6/1/2017).    SOCIAL HISTORY  Social History   Substance Use Topics   • Smoking status: Never Smoker   • Alcohol use Yes      Comment: SOCIAL      History   Drug Use No       FAMILY HISTORY  Family  History   Problem Relation Age of Onset   • Cancer Mother         breast cancer   • Cancer Sister         breast cancer       CURRENT MEDICATIONS  Home Medications     Reviewed by Feliciano PollardD (Pharmacist) on 10/09/18 at 2227  Med List Status: Partial   Medication Last Dose Status   amlodipine (NORVASC) 10 MG Tab 10/8/2018 Active   aspirin EC (ECOTRIN) 81 MG Tablet Delayed Response 10/8/2018 Active   CITALOPRAM HYDROBROMIDE PO 10/7/2018 Active   rosuvastatin (CRESTOR) 40 MG tablet 10/7/2018 Active                ALLERGIES  No Known Allergies    PHYSICAL EXAM  VITAL SIGNS: /60   Pulse (!) 113   Temp 37 °C (98.6 °F)   Resp 16   Ht 1.524 m (5')   Wt 45.4 kg (100 lb)   BMI 19.53 kg/m²   Vitals reviewed by myself.  Physical Exam  Nursing note and vitals reviewed.  Constitutional: Seems uncomfortable. Well-developed and well-nourished. No acute distress.   HENT: Head is normocephalic and atraumatic.  Dry mucous membranes  Eyes: extra-ocular movements intact  Cardiovascular: Tachycardic rate and regular rhythm. No murmur heard.  2+ bilateral distal pedal pulses  Pulmonary/Chest: Breath sounds normal. No wheezes or rales.   Abdominal: Soft and non-tender. No distention.    Rectal: Stool was brown. No obvious blood however it was trace positive guaiac.   Musculoskeletal: Patient holds her right hip at internal rotation and is tender to palpation of the right lateral hip with obvious deformity.  She cannot range the hip or movement at all secondary to pain.  She is able to wiggle her toes in her right lower extremity.  No deformities noted to the right knee or right leg.  Normal range of motion of her left lower extremity.  No midline spinal tenderness.  Neurological: Awake and alert.  Sensation intact in bilateral lower extremity's.  Skin: Skin is warm and dry. No rash.       DIAGNOSTIC STUDIES /  LABS  Labs Reviewed   CBC WITH DIFFERENTIAL - Abnormal; Notable for the following:        Result Value     WBC 16.6 (*)     RBC 3.44 (*)     Hemoglobin 7.2 (*)     Hematocrit 24.4 (*)     MCV 70.9 (*)     MCH 20.9 (*)     MCHC 29.5 (*)     Platelet Count 565 (*)     Neutrophils-Polys 87.00 (*)     Lymphocytes 6.90 (*)     Neutrophils (Absolute) 14.44 (*)     Monos (Absolute) 1.01 (*)     All other components within normal limits    Narrative:     Indicate which anticoagulants the patient is on:->UNKNOWN   COMP METABOLIC PANEL - Abnormal; Notable for the following:     Potassium 3.3 (*)     Anion Gap 15.0 (*)     Glucose 159 (*)     Bun 31 (*)     AST(SGOT) 50 (*)     All other components within normal limits    Narrative:     Indicate which anticoagulants the patient is on:->UNKNOWN   PROTHROMBIN TIME - Abnormal; Notable for the following:     PT 15.6 (*)     INR 1.23 (*)     All other components within normal limits    Narrative:     Indicate which anticoagulants the patient is on:->UNKNOWN   TROPONIN - Abnormal; Notable for the following:     Troponin I 0.05 (*)     All other components within normal limits    Narrative:     Indicate which anticoagulants the patient is on:->UNKNOWN   CREATINE KINASE - Abnormal; Notable for the following:     CPK Total 972 (*)     All other components within normal limits    Narrative:     Indicate which anticoagulants the patient is on:->UNKNOWN   ESTIMATED GFR - Abnormal; Notable for the following:     GFR If Non  54 (*)     All other components within normal limits    Narrative:     Indicate which anticoagulants the patient is on:->UNKNOWN   URINALYSIS,CULTURE IF INDICATED - Abnormal; Notable for the following:     Ketones Trace (*)     Protein 100 (*)     Occult Blood Small (*)     All other components within normal limits   RETICULOCYTES COUNT - Abnormal; Notable for the following:     Imm. Reticulocyte Fraction 25.3 (*)     Retic Hgb Equivalent 18.2 (*)     All other components within normal limits   URINE MICROSCOPIC (W/UA) - Abnormal; Notable for the following:      Bacteria Few (*)     All other components within normal limits   IRON/TOTAL IRON BIND - Abnormal; Notable for the following:     Iron <10 (*)     Total Iron Binding 462 (*)     All other components within normal limits    Narrative:     Indicate which anticoagulants the patient is on:->UNKNOWN   BTYPE NATRIURETIC PEPTIDE    Narrative:     Indicate which anticoagulants the patient is on:->UNKNOWN   APTT    Narrative:     Indicate which anticoagulants the patient is on:->UNKNOWN   LIPASE    Narrative:     Indicate which anticoagulants the patient is on:->UNKNOWN   COD (ADULT)   PLATELET ESTIMATE    Narrative:     Indicate which anticoagulants the patient is on:->UNKNOWN   MORPHOLOGY    Narrative:     Indicate which anticoagulants the patient is on:->UNKNOWN   PERIPHERAL SMEAR REVIEW    Narrative:     Indicate which anticoagulants the patient is on:->UNKNOWN   DIFFERENTIAL MANUAL    Narrative:     Indicate which anticoagulants the patient is on:->UNKNOWN   ABO AND RH CONFIRMATION   FERRITIN   VITAMIN B12   FOLATE   TSH   HEMOGLOBIN A1C   OCCULT BLOOD STOOL   CBC WITH DIFFERENTIAL   COMP METABOLIC PANEL   LIPID PROFILE   CREATINE KINASE     All labs reviewed by me.    EKG Interpretation:  Interpreted by myself    12 Lead EKG interpreted by me to show:  Time: 1909  Sinus tachycardia  Rate 119  Axis: Normal  Intervals: Normal  No acute ST T segment changes.   My impression of this EKG: Patient is noted to have multiple PVC's and left ventricular hypertrophy is present. Does not indicate ischemia or arrhythmia at this time.    RADIOLOGY  DX-FEMUR-2+ RIGHT   Final Result         1.  Intertrochanteric femoral neck fracture.   2.  Atherosclerosis      DX-CHEST-PORTABLE (1 VIEW)   Final Result         1.  No acute cardiopulmonary disease.   2.  Atherosclerosis   3.  Hyperexpansion of lungs favors changes of COPD.      DX-PELVIS-1 OR 2 VIEWS   Final Result         1.  Intertrochanteric right femoral neck fracture.   2.   Atherosclerosis      US-EXTREMITY VENOUS LOWER BILAT    (Results Pending)     The radiologist's interpretation of all radiological studies have been reviewed by me.      REASSESSMENT  7:00 PM - Evaluated patient at bedside. Plan of care was discussed which is to order imaging to rule out fracture. She verbalized her understanding and agrees to the treatment care plan.     The patient will be resuscitated with 1L NS IV for tachycardia and clinical evidence of dehydration, due to possible surgery she is NPO and cannot drink PO fluids, threfore IV fluids will be given. Patient is tachycardic and appears clinically dehydrated.      - Response to IV fluids: improved, less tachcyardic    9:07 PM - Consulted with ortho who agrees to the plan and to admit. They will follow up with the patient for management.     9:24 PM - Recheck. Updated patient and daughter on her radiology results indicating fracture. She will be admitted. She verbalized her understanding and agrees to discharge.     9:38 PM - Consulted with hospitalist who will admit the patient.     9:45 PM  - Performed rectal exam at this time.       COURSE & MEDICAL DECISION MAKING  Nursing notes, VS, PMSFHx reviewed in chart.    Patient is a 83-year-old female who comes in with right hip pain after being on the ground for a day.  Differential diagnosis includes fracture, dislocation, musculoskeletal strain, sprain, rhabdomyolysis, electrolyte abnormality, acute kidney injury, dehydration, arrhythmia, acute coronary syndrome.  Diagnostic workup includes labs, EKG, chest x-ray, pelvic x-ray, right femur x-ray.    Patient's initial vitals are notable for tachycardia.  This is likely secondary to pain and clinical dehydration.  She is treated with IV fluids and fentanyl, pain is further managed with morphine.  After treatment patient is feeling improved and tachycardia resolves.  EKG returns demonstrates no evidence of arrhythmia or ischemia.  Labs returned and are  remarkable for hemoglobin of 7.2, patient is noted to be chronically anemic, however this is below her baseline.  It is a microcytic anemia.  I did perform a rectal exam which was trace positive for blood, no evidence of gross blood.  Patient's troponin is also noted to be mildly elevated at 0.05.  Patient's creatinine kinase is also elevated at 972, likely secondary to being down for a day.  Urinalysis returns demonstrates no signs of infection, there are ketones and protein in her urine consistent with dehydration.  Chest x-ray returns demonstrates no acute cardia pulmonary processes.  Pelvic x-ray is consistent with a right femoral neck intertrochanteric fracture.  I discussed the case with orthopedic surgeon Dr. Almonte who will further manage the fracture.  I will admit the patient to hospitalist for further management of anemia, elevated troponin, and elevated CPK.  I discussed the case with Dr. Solitario who has accepted the admission.  At time of admission patient is in guarded condition.    DISPOSITION:  Patient will be admitted to Dr. Solitario (Hospitalist) in guarded condition.        FINAL IMPRESSION  1. Fall, initial encounter    2. Anemia, unspecified type    3. Closed fracture of right hip, initial encounter ([facility])    4. Elevated troponin          I, Lindy Tripp (Scribe), am scribing for, and in the presence of, Dominique Baldwin M.D..    Electronically signed by: Lindy Tripp (Scribe), 10/9/2018    IDominique M.D. personally performed the services described in this documentation, as scribed by Lindy Tripp in my presence, and it is both accurate and complete. C    The note accurately reflects work and decisions made by me.  Dominique Baldwin  10/10/2018  12:06 AM

## 2018-10-10 NOTE — OR NURSING
Pt A&OX2, oriented to self and event. Pt requiring frequent reorientation in regards to place. VSS. Pt on 10 L oxymask per ERAS order. Afebrile. R hip surgical dressing CDI and ice pack in place. Pt states some numbness to RLE and unable to fully lift RLE off bed, 2/5 strength. Pt able to plantar/dorsi flex without difficulty. Pt denied pain or nausea in PACU, declined pain medication. Tolerated sips of water, coffee, and apple juice. IV maintenance fluids initiated per MAR. Report to SHERRELL Gutiérrez. Awaiting transport.

## 2018-10-10 NOTE — ASSESSMENT & PLAN NOTE
Mild no chest pain, likely due to severe dehydration and demand ischemia  No chest pain, no shortness of breath, non specific ekg changes  Trending down.  Holding ASA for now due to severe anemia.  Echo ok with ef 70 %,

## 2018-10-10 NOTE — ED NOTES
Huizar cath inserted in ED with Sadie WYNNE at BS. Pt tolerated procedure well. Urine output present. Pt resting in [facility], updated on POC, needs met.

## 2018-10-10 NOTE — THERAPY
OT consult received, pt scheduled for sx today, will see post op for evaluation as appropriate.

## 2018-10-10 NOTE — ED NOTES
Assumed care of patient. Pt assessed . Patient's concerns addressed.  Fall precautions in place.  Pt repositioned and comfortable.  Call light within reach. Will continue to monitor.  EKG in progress.

## 2018-10-10 NOTE — CONSULTS
ORTHOPEDIC SURGERY CONSULT NOTE  10-9-18    Reason for Consult  R hip fracture    Consulting Physician  Dr. Baldwin    HPI  83 y.o. female with PMH of HTN and stroke who presents to the ER after falling off her bed in the morning of 10-8-18 with right hip pain.  The patient states that she was sitting at the edge of her bed on Monday morning (10-8-18) when she slipped off the bed and landed onto her R hip.  She was unable to get off the floor and was found on the ground by her daughter this evening (10-9-18).  She states that her entire right leg is hurting her.  Prior to this fall, the patient was ambulatory with a walker for balance.  She lives alone and does all of her own shopping/housework.  She denies any previous hip pain.  She did undergo a R ankle ORIF over a year ago with Dr. Luna.  She denies any numbness or tingling.  She did have a sarmiento placed in the ER.    PMH/PSH  Past Medical History:   Diagnosis Date   • Hypertension    • Stroke (HCC)    • TIA (transient ischemic attack) 1/2015      Past Surgical History:   Procedure Laterality Date   • ANKLE ORIF Right 6/1/2017    Procedure: ANKLE ORIF;  Surgeon: Ricardo Luna M.D.;  Location: SURGERY [facility];  Service:         Meds  Patient's Medications   New Prescriptions    No medications on file   Previous Medications    AMLODIPINE (NORVASC) 10 MG TAB    Take 10 mg by mouth every day. Indications: High Blood Pressure    AMLODIPINE (NORVASC) 10 MG TAB    Take 10 mg by mouth every day. bp meds    ASPIRIN EC (ECOTRIN) 81 MG TABLET DELAYED RESPONSE    Take 81 mg by mouth every day. Indications: Temporary Stroke    POTASSIUM CHLORIDE ER (K-TAB) 20 MEQ TAB CR TABLET    Take 20 mEq by mouth every day.    POTASSIUM CHLORIDE PO    Take 20 mEq by mouth two times a day may change times on alt/days. Indications: potassium replacement    ROSUVASTATIN (CRESTOR) 40 MG TABLET    Take 40 mg by mouth every bedtime. Indications: High Amount of Fats in the Blood     SENNA-DOCUSATE (DOC-Q-LAX) 8.6-50 MG TAB    Take 2 Tabs by mouth every day.    SENNOSIDES-DOCUSATE SODIUM (SENOKOT-S) 8.6-50 MG TABLET    Take 2 Tabs by mouth 2 times a day. Indications: Constipation    TRAMADOL (ULTRAM) 50 MG TAB    Take 50 mg by mouth every 6 hours as needed for Moderate Pain. Indications: Moderate to Moderately Severe Pain    TRAMADOL (ULTRAM) 50 MG TABLET DISPERSIBLE DISPERSABLE TABLET    Take 1 tablet by mouth 4 times a day as needed (pain management). Indications: Moderate to Moderately Severe Pain   Modified Medications    No medications on file   Discontinued Medications    No medications on file        Allergies  NKDA    Social History  Smoking: denies  Lives alone, ambulates with walker    ROS  Constitutional: Denies fevers, chills, weight loss   ENT: negative for eye changes, oral lesions, difficulty swallowing   Cardiac: Denies chest pain or palpitations   Lungs: Denies cough or shortness of breath   GI: Negative except noted in the HPI   : Denies dysuria, hematuria   Skin: Denies rashes or other skin lesions   MSK: R hip/leg pain  Lymph: Denies enlarged nodes, night sweats, abnormal bleeding   Psych: negative for depressed mood, anxiety   Neuro: negative for confusion, weakness, sensory changes      Physical Exam  /60   Pulse (!) 106   Temp 37 °C (98.6 °F)   Resp 20   Ht 1.524 m (5')   Wt 45.4 kg (100 lb)   SpO2 94%   BMI 19.53 kg/m²    General - alert, oriented, NAD  HEENT - AT/NC  CV - RRR  Respiratory - no increased work of breath    RLE - skin intact, ecchymoses about the groin, shortened and internally rotated right leg, TTP over hip, compartments soft and compressible, 5/5 ehl/fhl/g/s/ta, SILT s/s/sp/dp/t, 2+ dp/pt pulse, toes wwp, <2 sec cap refill    Labs  Results for RIGOBERTO TAYLOR (MRN [id number]) as of 10/9/2018 22:14   Ref. Range 10/9/2018 18:40   WBC Latest Ref Range: 4.8 - 10.8 K/uL 16.6 (H)   RBC Latest Ref Range: 4.20 - 5.40 M/uL 3.44 (L)   Hemoglobin Latest  Ref Range: 12.0 - 16.0 g/dL 7.2 (L)   Hematocrit Latest Ref Range: 37.0 - 47.0 % 24.4 (L)   MCV Latest Ref Range: 81.4 - 97.8 fL 70.9 (L)   MCH Latest Ref Range: 27.0 - 33.0 pg 20.9 (L)   MCHC Latest Ref Range: 33.6 - 35.0 g/dL 29.5 (L)   RDW Latest Ref Range: 35.9 - 50.0 fL 47.2   Platelet Count Latest Ref Range: 164 - 446 K/uL 565 (H)   MPV Latest Ref Range: 9.0 - 12.9 fL 9.5   Neutrophils-Polys Latest Ref Range: 44.00 - 72.00 % 87.00 (H)   Neutrophils (Absolute) Latest Ref Range: 2.00 - 7.15 K/uL 14.44 (H)   Lymphocytes Latest Ref Range: 22.00 - 41.00 % 6.90 (L)   Lymphs (Absolute) Latest Ref Range: 1.00 - 4.80 K/uL 1.15   Monocytes Latest Ref Range: 0.00 - 13.40 % 6.10   Monos (Absolute) Latest Ref Range: 0.00 - 0.85 K/uL 1.01 (H)   Eosinophils Latest Ref Range: 0.00 - 6.90 % 0.00   Eos (Absolute) Latest Ref Range: 0.00 - 0.51 K/uL 0.00   Basophils Latest Ref Range: 0.00 - 1.80 % 0.00   Baso (Absolute) Latest Ref Range: 0.00 - 0.12 K/uL 0.00     Results for RIGOBERTO TAYLOR (MRN [id number]) as of 10/9/2018 22:14   Ref. Range 10/9/2018 18:40   WBC Latest Ref Range: 4.8 - 10.8 K/uL 16.6 (H)   RBC Latest Ref Range: 4.20 - 5.40 M/uL 3.44 (L)   Hemoglobin Latest Ref Range: 12.0 - 16.0 g/dL 7.2 (L)   Hematocrit Latest Ref Range: 37.0 - 47.0 % 24.4 (L)   MCV Latest Ref Range: 81.4 - 97.8 fL 70.9 (L)   MCH Latest Ref Range: 27.0 - 33.0 pg 20.9 (L)   MCHC Latest Ref Range: 33.6 - 35.0 g/dL 29.5 (L)   RDW Latest Ref Range: 35.9 - 50.0 fL 47.2   Platelet Count Latest Ref Range: 164 - 446 K/uL 565 (H)   MPV Latest Ref Range: 9.0 - 12.9 fL 9.5   Neutrophils-Polys Latest Ref Range: 44.00 - 72.00 % 87.00 (H)   Neutrophils (Absolute) Latest Ref Range: 2.00 - 7.15 K/uL 14.44 (H)   Lymphocytes Latest Ref Range: 22.00 - 41.00 % 6.90 (L)   Lymphs (Absolute) Latest Ref Range: 1.00 - 4.80 K/uL 1.15   Monocytes Latest Ref Range: 0.00 - 13.40 % 6.10   Monos (Absolute) Latest Ref Range: 0.00 - 0.85 K/uL 1.01 (H)   Eosinophils Latest Ref Range:  0.00 - 6.90 % 0.00   Eos (Absolute) Latest Ref Range: 0.00 - 0.51 K/uL 0.00   Basophils Latest Ref Range: 0.00 - 1.80 % 0.00   Baso (Absolute) Latest Ref Range: 0.00 - 0.12 K/uL 0.00     UA:  Results for RIGOBERTO TAYLOR (MRN [id number]) as of 10/9/2018 22:14   Ref. Range 10/9/2018 21:21   Urobilinogen, Urine Latest Ref Range: Negative  0.2   Color Unknown Yellow   Character Unknown Clear   Specific Gravity Latest Ref Range: <1.035  1.019   Ph Latest Ref Range: 5.0 - 8.0  5.0   Glucose Latest Ref Range: Negative mg/dL Negative   Ketones Latest Ref Range: Negative mg/dL Trace (A)   Bilirubin Latest Ref Range: Negative  Negative   Occult Blood Latest Ref Range: Negative  Small (A)   Protein Latest Ref Range: Negative mg/dL 100 (A)   Nitrite Latest Ref Range: Negative  Negative   Leukocyte Esterase Latest Ref Range: Negative  Negative   Micro Urine Req Unknown Microscopic     Imaging  XRAY pelvis/R hip/R femur - normal obliquity intertrochanteric fracture with intact posteromedial cortex    Procedures  none    Assessment  83 y.o. female 36 hours s/p fall with R hip intertroch fracture, anemic with elevated CPK likely secondary to rhabdo    Plan  - appreciate medical optimization prior to surgery (discussed with internal medicine team), consider transfusion if indicated, fluids for rhabdo  - NPO for surgery tomorrow  - added to surgery for Dr. Luna tomorrow: IMN R hip vs. DHS R hip  - pain control per primary team  - NWB RLE  - please obtain stat BLE Duplex to rule out DVT (relayed to Dr. Baldwin)      Beulah Almonte MD

## 2018-10-10 NOTE — ED TRIAGE NOTES
Chief Complaint   Patient presents with   • Hip Injury     R hip     /60   Pulse (!) 113   Temp 37 °C (98.6 °F)   Resp 16   Ht 1.524 m (5')   Wt 45.4 kg (100 lb)   BMI 19.53 kg/m²     BIB EMS from home for glf/hip pain that happened yesterday morning. Pt has been on the ground since. C/o R hip pain. Received 200mcg fentanyl PTA. Pt cleaned and placed on new sheets. Connected to monitor, labs drawn.     Chart up for eval.

## 2018-10-10 NOTE — ASSESSMENT & PLAN NOTE
Right sided fracture, intertrochanteric   S/p intramedullary mikel 10/10;  Post op care; since anemia is stable, start lovenox for DVT prophylaxis and monitor  PT/OT.  SNF referral

## 2018-10-10 NOTE — ED NOTES
This RN at BS to chaperone rectal exam with Dr. Baldwin, pt requesting po fluids, pt educated she is NPO

## 2018-10-10 NOTE — H&P
Hospital Medicine History & Physical Note    Date of Service  10/9/2018    Primary Care Physician  Duane Patel D.O.    Consultants  ortho    Code Status  full    Chief Complaint  Fall/ right hip pain    History of Presenting Illness  83 y.o. female who presented 10/9/2018 with right hip pain secondary to fall.  Patient arrives via EMS.  Patient had a ground-level fall was found by her daughter today.  Ground-level fall was yesterday she states that she was on the side of her bowel bed and fell and hit the right side of her hip.  Now she has severe hip pain and is unable to bear any weight.  She does not remember hitting her head or no loss of consciousness.  She was lying on the floor for over 24 hours secondary to the pain and inability to walk.  Was also found to have anemia but she was denying any blood in the stool or any bleeding.  She was also found to have a femur fracture and orthopedic surgery has been consulted for evaluation.    Review of Systems  Review of Systems   Constitutional: Negative for chills and fever.   HENT: Negative for congestion, hearing loss and tinnitus.    Eyes: Negative for blurred vision, double vision and discharge.   Respiratory: Negative for cough, hemoptysis and shortness of breath.    Cardiovascular: Negative for chest pain, palpitations and leg swelling.   Gastrointestinal: Negative for abdominal pain, heartburn, nausea and vomiting.   Genitourinary: Negative for dysuria and flank pain.   Musculoskeletal: Positive for falls and joint pain. Negative for myalgias.   Skin: Negative for rash.   Neurological: Negative for dizziness, sensory change, speech change, focal weakness and weakness.   Endo/Heme/Allergies: Negative for environmental allergies. Does not bruise/bleed easily.   Psychiatric/Behavioral: Negative for depression, hallucinations and substance abuse.       Past Medical History   has a past medical history of Hypertension; Stroke (HCC); and TIA (transient  ischemic attack) (1/2015). She also has no past medical history of Breast cancer (HCC).    Surgical History   has a past surgical history that includes ankle orif (Right, 6/1/2017).     Family History  family history includes Cancer in her mother and sister.     Social History   reports that she has never smoked. She does not have any smokeless tobacco history on file. She reports that she drinks alcohol. She reports that she does not use drugs.    Allergies  No Known Allergies    Medications  Prior to Admission Medications   Prescriptions Last Dose Informant Patient Reported? Taking?   POTASSIUM CHLORIDE PO   Yes No   Sig: Take 20 mEq by mouth two times a day may change times on alt/days. Indications: potassium replacement   amlodipine (NORVASC) 10 MG Tab 5/31/2017 at am  Yes No   Sig: Take 10 mg by mouth every day. Indications: High Blood Pressure   amlodipine (NORVASC) 10 MG Tab   Yes No   Sig: Take 10 mg by mouth every day. bp meds   aspirin EC (ECOTRIN) 81 MG Tablet Delayed Response 5/31/2017 at am  Yes No   Sig: Take 81 mg by mouth every day. Indications: Temporary Stroke   potassium Chloride ER (K-TAB) 20 MEQ Tab CR tablet   Yes No   Sig: Take 20 mEq by mouth every day.   rosuvastatin (CRESTOR) 40 MG tablet 5/31/2017 at pm  Yes No   Sig: Take 40 mg by mouth every bedtime. Indications: High Amount of Fats in the Blood   senna-docusate (DOC-Q-LAX) 8.6-50 MG Tab   Yes No   Sig: Take 2 Tabs by mouth every day.   sennosides-docusate sodium (SENOKOT-S) 8.6-50 MG tablet   Yes No   Sig: Take 2 Tabs by mouth 2 times a day. Indications: Constipation   traMADol (ULTRAM) 50 MG TABLET DISPERSIBLE dispersable tablet   Yes No   Sig: Take 1 tablet by mouth 4 times a day as needed (pain management). Indications: Moderate to Moderately Severe Pain   tramadol (ULTRAM) 50 MG Tab   Yes No   Sig: Take 50 mg by mouth every 6 hours as needed for Moderate Pain. Indications: Moderate to Moderately Severe Pain       Facility-Administered Medications: None       Physical Exam  Temp:  [37 °C (98.6 °F)] 37 °C (98.6 °F)  Pulse:  [101-113] 106  Resp:  [16-20] 20  BP: (127)/(60) 127/60    Physical Exam   Constitutional: She is oriented to person, place, and time. She appears well-developed and well-nourished. No distress.   HENT:   Head: Normocephalic and atraumatic.   Eyes: Pupils are equal, round, and reactive to light. Conjunctivae and EOM are normal.   Neck: Normal range of motion. Neck supple. No JVD present.   Cardiovascular: Regular rhythm, normal heart sounds and intact distal pulses.    No murmur heard.  tachycardia   Pulmonary/Chest: Effort normal and breath sounds normal. No respiratory distress. She has no wheezes.   Abdominal: Soft. Bowel sounds are normal. She exhibits no distension. There is no tenderness.   Musculoskeletal: Normal range of motion. She exhibits edema.   Right leg shortened and internally rotated   Neurological: She is alert and oriented to person, place, and time. She exhibits normal muscle tone.   Skin: Skin is warm and dry.   Psychiatric: She has a normal mood and affect. Her behavior is normal. Judgment and thought content normal.   Nursing note and vitals reviewed.      Laboratory:  Recent Labs      10/09/18   1840   WBC  16.6*   RBC  3.44*   HEMOGLOBIN  7.2*   HEMATOCRIT  24.4*   MCV  70.9*   MCH  20.9*   MCHC  29.5*   RDW  47.2   PLATELETCT  565*   MPV  9.5     Recent Labs      10/09/18   1840   SODIUM  145   POTASSIUM  3.3*   CHLORIDE  110   CO2  20   GLUCOSE  159*   BUN  31*   CREATININE  0.98   CALCIUM  9.8     Recent Labs      10/09/18   1840   ALTSGPT  29   ASTSGOT  50*   ALKPHOSPHAT  90   TBILIRUBIN  0.8   LIPASE  15   GLUCOSE  159*     Recent Labs      10/09/18   1840   APTT  27.2   INR  1.23*     Recent Labs      10/09/18   1840   BNPBTYPENAT  89         Recent Labs      10/09/18   1840   TROPONINI  0.05*       Urinalysis:    No results found     Imaging:  DX-FEMUR-2+ RIGHT   Final  Result         1.  Intertrochanteric femoral neck fracture.   2.  Atherosclerosis      DX-CHEST-PORTABLE (1 VIEW)   Final Result         1.  No acute cardiopulmonary disease.   2.  Atherosclerosis   3.  Hyperexpansion of lungs favors changes of COPD.      DX-PELVIS-1 OR 2 VIEWS   Final Result         1.  Intertrochanteric right femoral neck fracture.   2.  Atherosclerosis            Assessment/Plan:  I anticipate this patient will require at least two midnights for appropriate medical management, necessitating inpatient admission.    * Femur fracture (HCC)   Assessment & Plan    Right sided fracture, intertrochanteric   NPO at midnight  Pain control, anti emetics  Pt/ot eval when able   Ortho consulted likely surgical eval in am        Elevated BUN   Assessment & Plan    Due to dehydration   Continue with IVF for now        Abnormal LFTs   Assessment & Plan    Repeat labs after IVF  Consider stoping statin        Elevated troponin   Assessment & Plan    Mild no chest pain, likely due to severe dehydration and demand ischemia  No chest pain, no shortness of breath, non specific ekg changes        Anemia   Assessment & Plan    No signs of acute bleeding   Will recheck cbc in am may need PRBC transfusion prior to surgical intervention  Lab workup and occult blood have been ordered  May need GI consult in am        Rhabdomyolysis   Assessment & Plan    Due to being down for 1 day   Will continue with IVF   Repeat labs in am         Hypokalemia- (present on admission)   Assessment & Plan    Replace and recheck         HTN- (present on admission)   Assessment & Plan    Resume home amlodipine        Leukocytosis- (present on admission)   Assessment & Plan    Likely reactive from acute fracture cont to monitor   No signs of infection            VTE prophylaxis: scd

## 2018-10-10 NOTE — CARE PLAN
Problem: Fluid Volume:  Goal: Will maintain balanced intake and output  Will page MD regarding low Hgb and closely monitor.     Problem: Respiratory:  Goal: Respiratory status will improve  Will wean off O2. IS education given, will ensure pt uses frequently and correctly.

## 2018-10-10 NOTE — PROGRESS NOTES
[facility] Hospitalist Progress Note    Date of Service: 10/10/2018    Chief Complaint  83 y.o. female admitted 10/9/2018 with R hip fracture after GLF at home.    Interval Problem Update  Feels better; no significant pain at rest; denies sob/chest pain; admits to fatigue/JOVEL over last 2 months but denies GI bleed sx's; also has unintentional wt loss of 15 lbs over last few months.    Consultants/Specialty  ortho    Disposition  TBD        Review of Systems   Constitutional: Negative for fever.   HENT: Negative for hearing loss.    Eyes: Negative for blurred vision.   Respiratory: Negative for cough.    Cardiovascular: Negative for chest pain.   Gastrointestinal: Negative for blood in stool, heartburn and melena.   Genitourinary: Negative for dysuria.   Musculoskeletal: Positive for joint pain.   Skin: Negative for rash.   Neurological: Negative for dizziness.   Psychiatric/Behavioral: Negative for depression.      Physical Exam  Laboratory/Imaging   Hemodynamics  Temp (24hrs), Av.8 °C (98.3 °F), Min:36.3 °C (97.4 °F), Max:37.2 °C (98.9 °F)   Temperature: 36.3 °C (97.4 °F)  Pulse  Av.1  Min: 79  Max: 113 Heart Rate (Monitored): 94  Blood Pressure : 115/90, NIBP: 136/54      Respiratory      Respiration: 17, Pulse Oximetry: 97 %        RUL Breath Sounds: Diminished, RML Breath Sounds: Diminished, RLL Breath Sounds: Diminished, INGRID Breath Sounds: Diminished, LLL Breath Sounds: Diminished    Fluids    Intake/Output Summary (Last 24 hours) at 10/10/18 0842  Last data filed at 10/09/18 2100   Gross per 24 hour   Intake             1900 ml   Output                0 ml   Net             1900 ml       Nutrition  Orders Placed This Encounter   Procedures   • Diet NPO     Standing Status:   Standing     Number of Occurrences:   8     Order Specific Question:   Restrict to:     Answer:   Strict [1]     Physical Exam   Constitutional: She is oriented to person, place, and time. No distress.   Thin, pale, elderly female    HENT:   Head: Normocephalic and atraumatic.   Eyes: Pupils are equal, round, and reactive to light. EOM are normal.   Neck: Neck supple.   Cardiovascular: Normal rate and regular rhythm.    Murmur heard.  3/6 syst murmur at apex   Pulmonary/Chest: Effort normal and breath sounds normal. No respiratory distress.   Abdominal: Soft. Bowel sounds are normal. She exhibits no distension. There is no tenderness.   Musculoskeletal: She exhibits no edema.   RLE internally rotated and shortened   Neurological: She is alert and oriented to person, place, and time.   Skin: Skin is warm.   Psychiatric: Her behavior is normal.       Recent Labs      10/09/18   1840  10/10/18   0619   WBC  16.6*  12.4*   RBC  3.44*  3.00*   HEMOGLOBIN  7.2*  6.0*   HEMATOCRIT  24.4*  21.6*   MCV  70.9*  72.0*   MCH  20.9*  20.0*   MCHC  29.5*  27.8*   RDW  47.2  48.9   PLATELETCT  565*  439   MPV  9.5  8.9*     Recent Labs      10/09/18   1840  10/10/18   0619   SODIUM  145  149*   POTASSIUM  3.3*  3.3*   CHLORIDE  110  115*   CO2  20  22   GLUCOSE  159*  123*   BUN  31*  32*   CREATININE  0.98  0.88   CALCIUM  9.8  9.1     Recent Labs      10/09/18   1840   APTT  27.2   INR  1.23*     Recent Labs      10/09/18   1840   BNPBTYPENAT  89     Recent Labs      10/10/18   0619   TRIGLYCERIDE  75   HDL  80   LDL  39          Assessment/Plan     * Femur fracture (HCC)   Assessment & Plan    Right sided fracture, intertrochanteric   NPO at midnight  Pain control, anti emetics  Await surgery this afternoon        Anemia- (present on admission)   Assessment & Plan    With severe iron deficiency; patient denies known GI/internal bleed however.  H/H lower after IVF today; transfuse with 2 units PRBC and f/u cbc's; check stool guiacs  Hold ASA; empiric PPI; will need eventual GI evaluation.        Elevated BUN- (present on admission)   Assessment & Plan    Due to dehydration   Continue with IVF for now        Elevated troponin- (present on admission)    Assessment & Plan    Mild no chest pain, likely due to severe dehydration and demand ischemia  No chest pain, no shortness of breath, non specific ekg changes  F/u level.  Holding ASA for now due to severe anemia.        Rhabdomyolysis- (present on admission)   Assessment & Plan    Mild, Due to being down for 1 day; improving  Will continue with IVF   Repeat labs in am         Hypokalemia- (present on admission)   Assessment & Plan    Replace and recheck         HTN- (present on admission)   Assessment & Plan    Resume home amlodipine        Leukocytosis- (present on admission)   Assessment & Plan    Likely reactive from acute fracture cont to monitor   No signs of infection; better today          Quality-Core Measures

## 2018-10-10 NOTE — PROGRESS NOTES
Two RN skin check with Maggi RN. Sacrum intact, pink, and blanching. Bruise to right lower extremity. Red scratches to left lower extremity. No other skin abnormalities noted at this time.

## 2018-10-10 NOTE — OP REPORT
DATE OF OPERATION: 10/10/2018     PREOPERATIVE DIAGNOSIS: right intertrochanteric femur fracture    POSTOPERATIVE DIAGNOSIS: Same    PROCEDURE PERFORMED: Surgical treatment of right intertrochanteric femur fracture with intramedullary device    SURGEON: Ricardo Luna M.D.     ASSISTANT:  Layton Calderon DO    ANESTHESIOLOGIST: Nichelle Ramirez MD.    ANESTHESIA: General    ASA CLASSIFICATION: III    INDICATIONS: The patient is a 83 y.o. female with a right intertrochanteric femur fracture resulting from a ground level fall.  The patient denies antecedent pain, and was found to have a normal neurovascular exam and skin envelope.  Radiographs demonstrated the intertrochanteric femur fracture.  Given these findings, the patient is an appropriately indicated candidate for surgical treatment of the intertrochanteric fracture with an intramedullary device.  I discussed the risks and benefits of the procedure, including the risks of infection, wound healing complication, neurovascular injury, persistent hip pain, malunion, non-union, malrotation, and the medical risks of anesthesia including MI, stroke, and death.  Benefits include early mobilization, improved chance of union, and reduction in the medical risks of hip fractures.  Alternatives to surgery were also discussed, including non-operative management, which I did not recommend.  The patient was in agreement with the plan to proceed, and the informed consent was signed and documented.  I met with the patient pre-operatively and marked the operative extremity with their agreement.  We proceeded to the operating room.     WOUND CLASSIFICATION: Class I    SPECIMEN: None    ESTIMATED BLOOD LOSS: 50 mL    PROCEDURE:  The patient underwent anesthesia, and was positioned supine on the fracture table with all bony prominences were well padded.  Sequential compression devices were employed.  Preoperative imaging was obtained, demonstrated partial reduction of the fracture  using the table. The correct operative site was prepped and draped into a sterile field, and the surgical team scrubbed in.  A procedural pause was conducted to verify correct patient, correct extremity, presence of the surgeons initials on the operative extremity, and administration of IV antibiotics, in this case, Ancef.    A lateral thigh incision was marked and sharply incised.  We then further reduced the fracture using a bone hook and Person elevator.  The starting guide pin for the OIC hip nail was advanced down to a start point on the greater trochanter, and its position was confirmed on fluoroscopy.  This was advanced into the femur on power.  An incision was made around the pin, and the entry reamer was then used to gain access to the femoral canal.  The guide pin was then removed.    A long ball-tip guide wire was advanced down the femur to the knee, its position confirmed on fluoroscopy.  We then measured for, and selected a 11 mm short 127.5 degree OIC hip nail.  We then advanced the nail over the guide pin to an appropriate depth, confirmed by fluoroscopy.    The guide for the lag screw was then advanced down to bone through a lateral thigh incision.  The starting guide pin was advanced to a central position within the femoral neck/head, confirmed by fluoroscopy.  We measured our lag screw, and reamed for our lag screw.  We then placed the lag screw by hand.  The fracture was compressed, and the set screw was locked proximally.  All instruments were removed from the proximal femur, and final fluoroscopic images obtained.    We then placed one statically interlocked distal screw through the jig, obtaining good bicortical purchase.    We then irrigated all wounds with normal saline, and closed in layers, with 2-0 Vicryl in the subcutaneous tissue, and staples in the skin.  Sterile dressings were applied.       The patient tolerated the procedure well. There were no apparent complications. All sponge,  needle, and instrument counts were correct on two separate occasions. She was awakened, extubated, and transferred to the recovery room in satisfactory condition.     Post-Operative Plan:    1.  The patient should bear weight as tolerated on their operative extremity.  Gait aids (crutch or crutches, cane, walker) may be used as needed, and may be discontinued when no longer required.  2.  IV antibiotics - may be continued for 24 hours, but are not required.  3.  DVT prophylaxis - SCD's and Lovenox 40 mg SQ daily while inpatient.  The patient may transition to Aspirin 325 mg PO BID as an outpatient  4.  Discharge planning  ____________________________________   Ricardo Luna M.D.   DD: 10/10/2018  12:28 PM

## 2018-10-10 NOTE — ED NOTES
Med rec PARTIALLY completed by interview with patient at bedside with visitor at bedside (approved by patient)  Patient does not know the exact dose of her citalopram (previously was on 20 mg but not positive if that is the same dose she is on now)  Will follow up in AM with pharmacy  NKDA confirmed  No abx in the past 30 days

## 2018-10-10 NOTE — PROGRESS NOTES
Pt is AAO x4.  Pt reports a 2/10 R hip pain level.  Ice pack placed, repositioned.  VS WNL.  .5L O2 running via NC.  L PIV patent, running fluids.  Hgb 6.0 this AM, will page MD.  POC discussed.  All needs met at this time.  Bed in low position.  Call light within reach.  Rounding in place.

## 2018-10-10 NOTE — PROGRESS NOTES
Pt arrived back on unit.  AAOx4.  Declines pain or discomfort at this time.  Dressing CDI.  VS per protocol in place.

## 2018-10-11 PROBLEM — R41.0 DELIRIUM: Status: ACTIVE | Noted: 2018-10-11

## 2018-10-11 LAB
ALBUMIN SERPL BCP-MCNC: 3.2 G/DL (ref 3.2–4.9)
ALBUMIN/GLOB SERPL: 1.3 G/DL
ALP SERPL-CCNC: 64 U/L (ref 30–99)
ALT SERPL-CCNC: 26 U/L (ref 2–50)
ANION GAP SERPL CALC-SCNC: 10 MMOL/L (ref 0–11.9)
AST SERPL-CCNC: 44 U/L (ref 12–45)
BASOPHILS # BLD AUTO: 0.1 % (ref 0–1.8)
BASOPHILS # BLD: 0.01 K/UL (ref 0–0.12)
BILIRUB SERPL-MCNC: 1.3 MG/DL (ref 0.1–1.5)
BUN SERPL-MCNC: 24 MG/DL (ref 8–22)
CALCIUM SERPL-MCNC: 8.6 MG/DL (ref 8.5–10.5)
CHLORIDE SERPL-SCNC: 111 MMOL/L (ref 96–112)
CK SERPL-CCNC: 768 U/L (ref 0–154)
CO2 SERPL-SCNC: 20 MMOL/L (ref 20–33)
CREAT SERPL-MCNC: 0.74 MG/DL (ref 0.5–1.4)
EOSINOPHIL # BLD AUTO: 0 K/UL (ref 0–0.51)
EOSINOPHIL NFR BLD: 0 % (ref 0–6.9)
ERYTHROCYTE [DISTWIDTH] IN BLOOD BY AUTOMATED COUNT: 53.1 FL (ref 35.9–50)
GLOBULIN SER CALC-MCNC: 2.4 G/DL (ref 1.9–3.5)
GLUCOSE SERPL-MCNC: 179 MG/DL (ref 65–99)
HCT VFR BLD AUTO: 24.1 % (ref 37–47)
HEMOCCULT STL QL: NEGATIVE
HGB BLD-MCNC: 7.6 G/DL (ref 12–16)
IMM GRANULOCYTES # BLD AUTO: 0.14 K/UL (ref 0–0.11)
IMM GRANULOCYTES NFR BLD AUTO: 1.2 % (ref 0–0.9)
LYMPHOCYTES # BLD AUTO: 0.66 K/UL (ref 1–4.8)
LYMPHOCYTES NFR BLD: 5.6 % (ref 22–41)
MCH RBC QN AUTO: 23.5 PG (ref 27–33)
MCHC RBC AUTO-ENTMCNC: 31.5 G/DL (ref 33.6–35)
MCV RBC AUTO: 74.4 FL (ref 81.4–97.8)
MONOCYTES # BLD AUTO: 1.16 K/UL (ref 0–0.85)
MONOCYTES NFR BLD AUTO: 9.9 % (ref 0–13.4)
NEUTROPHILS # BLD AUTO: 9.72 K/UL (ref 2–7.15)
NEUTROPHILS NFR BLD: 83.2 % (ref 44–72)
NRBC # BLD AUTO: 0.04 K/UL
NRBC BLD-RTO: 0.3 /100 WBC
PLATELET # BLD AUTO: 320 K/UL (ref 164–446)
PMV BLD AUTO: 9 FL (ref 9–12.9)
POTASSIUM SERPL-SCNC: 3.4 MMOL/L (ref 3.6–5.5)
PROT SERPL-MCNC: 5.6 G/DL (ref 6–8.2)
RBC # BLD AUTO: 3.24 M/UL (ref 4.2–5.4)
SODIUM SERPL-SCNC: 141 MMOL/L (ref 135–145)
TROPONIN I SERPL-MCNC: 0.03 NG/ML (ref 0–0.04)
WBC # BLD AUTO: 11.7 K/UL (ref 4.8–10.8)

## 2018-10-11 PROCEDURE — 99232 SBSQ HOSP IP/OBS MODERATE 35: CPT | Performed by: INTERNAL MEDICINE

## 2018-10-11 PROCEDURE — 97166 OT EVAL MOD COMPLEX 45 MIN: CPT

## 2018-10-11 PROCEDURE — 82550 ASSAY OF CK (CPK): CPT

## 2018-10-11 PROCEDURE — 84484 ASSAY OF TROPONIN QUANT: CPT

## 2018-10-11 PROCEDURE — 85025 COMPLETE CBC W/AUTO DIFF WBC: CPT

## 2018-10-11 PROCEDURE — 770006 HCHG ROOM/CARE - MED/SURG/GYN SEMI*

## 2018-10-11 PROCEDURE — 700102 HCHG RX REV CODE 250 W/ 637 OVERRIDE(OP): Performed by: INTERNAL MEDICINE

## 2018-10-11 PROCEDURE — 700101 HCHG RX REV CODE 250: Performed by: INTERNAL MEDICINE

## 2018-10-11 PROCEDURE — G8979 MOBILITY GOAL STATUS: HCPCS | Mod: CI

## 2018-10-11 PROCEDURE — G8978 MOBILITY CURRENT STATUS: HCPCS | Mod: CK

## 2018-10-11 PROCEDURE — G8988 SELF CARE GOAL STATUS: HCPCS | Mod: CI

## 2018-10-11 PROCEDURE — A9270 NON-COVERED ITEM OR SERVICE: HCPCS | Performed by: HOSPITALIST

## 2018-10-11 PROCEDURE — 700102 HCHG RX REV CODE 250 W/ 637 OVERRIDE(OP): Performed by: HOSPITALIST

## 2018-10-11 PROCEDURE — 80053 COMPREHEN METABOLIC PANEL: CPT

## 2018-10-11 PROCEDURE — 97162 PT EVAL MOD COMPLEX 30 MIN: CPT

## 2018-10-11 PROCEDURE — A9270 NON-COVERED ITEM OR SERVICE: HCPCS | Performed by: INTERNAL MEDICINE

## 2018-10-11 PROCEDURE — 82272 OCCULT BLD FECES 1-3 TESTS: CPT

## 2018-10-11 PROCEDURE — G8987 SELF CARE CURRENT STATUS: HCPCS | Mod: CK

## 2018-10-11 PROCEDURE — 36415 COLL VENOUS BLD VENIPUNCTURE: CPT

## 2018-10-11 RX ORDER — OMEPRAZOLE 20 MG/1
20 CAPSULE, DELAYED RELEASE ORAL DAILY
Status: DISCONTINUED | OUTPATIENT
Start: 2018-10-11 | End: 2018-10-15 | Stop reason: HOSPADM

## 2018-10-11 RX ORDER — FERROUS SULFATE 325(65) MG
325 TABLET ORAL 2 TIMES DAILY WITH MEALS
Status: DISCONTINUED | OUTPATIENT
Start: 2018-10-11 | End: 2018-10-15 | Stop reason: HOSPADM

## 2018-10-11 RX ORDER — POTASSIUM CHLORIDE 20 MEQ/1
20 TABLET, EXTENDED RELEASE ORAL DAILY
Status: DISCONTINUED | OUTPATIENT
Start: 2018-10-11 | End: 2018-10-12

## 2018-10-11 RX ADMIN — OMEPRAZOLE 20 MG: 20 CAPSULE, DELAYED RELEASE ORAL at 12:49

## 2018-10-11 RX ADMIN — SENNOSIDES AND DOCUSATE SODIUM 2 TABLET: 8.6; 5 TABLET ORAL at 17:20

## 2018-10-11 RX ADMIN — AMLODIPINE BESYLATE 10 MG: 10 TABLET ORAL at 05:27

## 2018-10-11 RX ADMIN — POTASSIUM CHLORIDE, DEXTROSE MONOHYDRATE AND SODIUM CHLORIDE: 150; 5; 450 INJECTION, SOLUTION INTRAVENOUS at 10:24

## 2018-10-11 RX ADMIN — FERROUS SULFATE TAB 325 MG (65 MG ELEMENTAL FE) 325 MG: 325 (65 FE) TAB at 12:49

## 2018-10-11 RX ADMIN — ROSUVASTATIN CALCIUM 40 MG: 20 TABLET, FILM COATED ORAL at 17:20

## 2018-10-11 RX ADMIN — POTASSIUM CHLORIDE 20 MEQ: 1500 TABLET, EXTENDED RELEASE ORAL at 12:49

## 2018-10-11 RX ADMIN — FERROUS SULFATE TAB 325 MG (65 MG ELEMENTAL FE) 325 MG: 325 (65 FE) TAB at 17:20

## 2018-10-11 RX ADMIN — SENNOSIDES AND DOCUSATE SODIUM 2 TABLET: 8.6; 5 TABLET ORAL at 05:27

## 2018-10-11 RX ADMIN — CITALOPRAM HYDROBROMIDE 20 MG: 20 TABLET ORAL at 05:27

## 2018-10-11 ASSESSMENT — COGNITIVE AND FUNCTIONAL STATUS - GENERAL
MOVING TO AND FROM BED TO CHAIR: A LITTLE
PERSONAL GROOMING: A LITTLE
TURNING FROM BACK TO SIDE WHILE IN FLAT BAD: A LITTLE
SUGGESTED CMS G CODE MODIFIER DAILY ACTIVITY: CK
HELP NEEDED FOR BATHING: A LOT
MOBILITY SCORE: 15
CLIMB 3 TO 5 STEPS WITH RAILING: TOTAL
TOILETING: A LOT
STANDING UP FROM CHAIR USING ARMS: A LITTLE
WALKING IN HOSPITAL ROOM: A LOT
DRESSING REGULAR LOWER BODY CLOTHING: A LOT
DRESSING REGULAR UPPER BODY CLOTHING: A LITTLE
DAILY ACTIVITIY SCORE: 16
MOVING FROM LYING ON BACK TO SITTING ON SIDE OF FLAT BED: A LITTLE
SUGGESTED CMS G CODE MODIFIER MOBILITY: CK

## 2018-10-11 ASSESSMENT — ENCOUNTER SYMPTOMS
DEPRESSION: 0
COUGH: 0
HEARTBURN: 0
DIZZINESS: 0
BLOOD IN STOOL: 0
BLURRED VISION: 0
FEVER: 0

## 2018-10-11 ASSESSMENT — GAIT ASSESSMENTS
ASSISTIVE DEVICE: FRONT WHEEL WALKER
GAIT LEVEL OF ASSIST: CONTACT GUARD ASSIST
DISTANCE (FEET): 10
DEVIATION: ANTALGIC;STEP TO;DECREASED BASE OF SUPPORT;SHUFFLED GAIT;DECREASED HEEL STRIKE;DECREASED TOE OFF

## 2018-10-11 ASSESSMENT — PAIN SCALES - GENERAL
PAINLEVEL_OUTOF10: 0

## 2018-10-11 ASSESSMENT — ACTIVITIES OF DAILY LIVING (ADL): TOILETING: INDEPENDENT

## 2018-10-11 NOTE — THERAPY
"Physical Therapy Evaluation completed.   Bed Mobility:  Supine to Sit:  (received sitting up in a chair)  Transfers: Sit to Stand: Minimal Assist  Gait: Level Of Assist: Contact Guard Assist with Front-Wheel Walker       Plan of Care: Will benefit from Physical Therapy 3 times per week  Discharge Recommendations: Equipment: Will Continue to Assess for Equipment Needs.     See \"Rehab Therapy-Acute\" Patient Summary Report for complete documentation.     Pt was recently admitted for a GLF and presented with R hip fracture. Pt is now s/p IMN of R hip with WBAT preacutions in place. Pt presented to PT with imparied balance, impaired gait, weakness, pain, and dec activity tolerance. Pt was primarily limtied by pain. Pt was able to ambulate about 10 ft at this time with FWW use and demonstrates with an antalgic gait, step to, and poor WB tolerance on RLE. Pt attempted to weight shift however, is unable to tolerate due to pain. Pt educated on supine and seated ther-ex and was able to demonstrate good mechanics. Pt will benefit from continued skilled PT while in house, with recommendation for post acute therapy serviecs prior to d/c home given current objective findings.   "

## 2018-10-11 NOTE — PROGRESS NOTES
Pt is AAO x4.  Pt reports a 2/10 R hip pain level.  Ice pack placed, repositioned.  R hip dressing in place, CDI.  VS WNL.  L PIV patent, running fluids.  Pt up in chair for bfast.   POC discussed.  All needs met at this time.  Bed in low position.  Call light within reach.  Rounding in place.

## 2018-10-11 NOTE — PROGRESS NOTES
ORTHOPAEDIC SURGERY PROGRESS NOTE     Patient seen and examined. Pain generally well-controlled. CNAs working with patient. Bhupendra still in. No acute events overnight.    Blood pressure 105/61, pulse 88, temperature 36.8 °C (98.3 °F), resp. rate 17, height 1.524 m (5'), weight 51.3 kg (113 lb 1.5 oz), SpO2 92 %, not currently breastfeeding.    Laboratory Values  Recent Labs      10/09/18   1840  10/10/18   0619  10/10/18   1619  10/11/18   0119   WBC  16.6*  12.4*   --   11.7*   RBC  3.44*  3.00*   --   3.24*   HEMOGLOBIN  7.2*  6.0*  7.0*  7.6*   HEMATOCRIT  24.4*  21.6*  23.6*  24.1*   MCV  70.9*  72.0*   --   74.4*   MCH  20.9*  20.0*   --   23.5*   MCHC  29.5*  27.8*   --   31.5*   RDW  47.2  48.9   --   53.1*   PLATELETCT  565*  439   --   320   MPV  9.5  8.9*   --   9.0     Recent Labs      10/09/18   1840  10/10/18   0619  10/11/18   0119   SODIUM  145  149*  141   POTASSIUM  3.3*  3.3*  3.4*   CHLORIDE  110  115*  111   CO2  20  22  20   GLUCOSE  159*  123*  179*   BUN  31*  32*  24*   CPKTOTAL  972*  788*  768*     Recent Labs      10/09/18   1840   APTT  27.2   INR  1.23*         Physical Exam  Gen: no acute distress, nontoxic  Extremities:  RLE:   -post op dressings clean and dry, no strikethrough   -SILT DP/SP/saph/sural nerves   -+TA/EHL/GCS/Peroneals   -all compartments soft and compressible, no pain with PROM    -skin warm and well-perfused, brisk capillary refill      Assessment  83F status post fall  -R IT fx s/p IMN 10/10/18  -ABLA requiring transfusions, Hb 7.6 this am  -lyte replacement     Plan  Admission: inpatient  Antibiotics: periop  Analgesia: multimodal   Activity/WB: WBAT  Anticoagulation: held for bleeding risk, mechanical with SCDs while in bed   Diet: advance as tolerated  Additional surgery: none planned   Disposition: monitor progress with therapy, will likely require SNF      Layton Tolbert DO, MSc

## 2018-10-11 NOTE — ASSESSMENT & PLAN NOTE
Post op, likely due to narcotic and acute illness. No h/o dementia per daughter.  Resolving, supportive care.

## 2018-10-11 NOTE — CARE PLAN
Problem: Venous Thromboembolism (VTW)/Deep Vein Thrombosis (DVT) Prevention:  Goal: Patient will participate in Venous Thrombosis (VTE)/Deep Vein Thrombosis (DVT)Prevention Measures    Intervention: Ensure patient wears graduated elastic stockings (KATE hose) and/or SCDs, if ordered, when in bed or chair (Remove at least once per shift for skin check)  SCD in place. Pt tolerating well.  Will try and ambulate pt once during shift      Problem: Fluid Volume:  Goal: Will maintain balanced intake and output  Pt tolerating oral and IV fluids.  Pt has a sarmiento inserted.  Will monitor output

## 2018-10-11 NOTE — PROGRESS NOTES
[facility]ist Progress Note    Date of Service: 10/11/2018    Chief Complaint  83 y.o. female admitted 10/9/2018 with R hip fracture after GLF at home.    Interval Problem Update  Feels ok; no pain at this time; developed confusion this am per daughter; currently mildly confused, oriented to name/place/month but not year.  Pleasant and cooperative however.    Consultants/Specialty  ortho    Disposition  TBD        Review of Systems   Constitutional: Negative for fever.   HENT: Negative for hearing loss.    Eyes: Negative for blurred vision.   Respiratory: Negative for cough.    Cardiovascular: Negative for chest pain.   Gastrointestinal: Negative for blood in stool, heartburn and melena.   Genitourinary: Negative for dysuria.   Musculoskeletal: Positive for joint pain.   Skin: Negative for rash.   Neurological: Negative for dizziness.   Psychiatric/Behavioral: Negative for depression.      Physical Exam  Laboratory/Imaging   Hemodynamics  Temp (24hrs), Av.9 °C (98.4 °F), Min:36.4 °C (97.5 °F), Max:37.6 °C (99.6 °F)   Temperature: 36.4 °C (97.5 °F)  Pulse  Av.1  Min: 79  Max: 113 Heart Rate (Monitored): (!) 102  Blood Pressure : 103/61, NIBP: 124/57      Respiratory      Respiration: 14, Pulse Oximetry: 94 %        RUL Breath Sounds: Diminished, RML Breath Sounds: Diminished, RLL Breath Sounds: Diminished, INGRID Breath Sounds: Diminished, LLL Breath Sounds: Diminished    Fluids    Intake/Output Summary (Last 24 hours) at 10/11/18 1054  Last data filed at 10/11/18 0452   Gross per 24 hour   Intake          3380.83 ml   Output             3190 ml   Net           190.83 ml       Nutrition  Orders Placed This Encounter   Procedures   • Diet Order Regular     Standing Status:   Standing     Number of Occurrences:   1     Order Specific Question:   Diet:     Answer:   Regular [1]     Physical Exam   Constitutional: No distress.   Thin, pale, elderly female   HENT:   Head: Normocephalic and atraumatic.   Eyes:  Pupils are equal, round, and reactive to light. EOM are normal.   Neck: Neck supple.   Cardiovascular: Normal rate and regular rhythm.    Murmur heard.  3/6 syst murmur at apex   Pulmonary/Chest: Effort normal and breath sounds normal. No respiratory distress.   Abdominal: Soft. Bowel sounds are normal. She exhibits no distension. There is no tenderness.   Musculoskeletal: She exhibits no edema.   RLE internally rotated and shortened   Neurological: She is alert. She exhibits normal muscle tone.   Mild confusion, alert and oriented to name/place/month but not year.     Skin: Skin is warm.   Psychiatric: Her behavior is normal.       Recent Labs      10/09/18   1840  10/10/18   0619  10/10/18   1619  10/11/18   0119   WBC  16.6*  12.4*   --   11.7*   RBC  3.44*  3.00*   --   3.24*   HEMOGLOBIN  7.2*  6.0*  7.0*  7.6*   HEMATOCRIT  24.4*  21.6*  23.6*  24.1*   MCV  70.9*  72.0*   --   74.4*   MCH  20.9*  20.0*   --   23.5*   MCHC  29.5*  27.8*   --   31.5*   RDW  47.2  48.9   --   53.1*   PLATELETCT  565*  439   --   320   MPV  9.5  8.9*   --   9.0     Recent Labs      10/09/18   1840  10/10/18   0619  10/11/18   0119   SODIUM  145  149*  141   POTASSIUM  3.3*  3.3*  3.4*   CHLORIDE  110  115*  111   CO2  20  22  20   GLUCOSE  159*  123*  179*   BUN  31*  32*  24*   CREATININE  0.98  0.88  0.74   CALCIUM  9.8  9.1  8.6     Recent Labs      10/09/18   1840   APTT  27.2   INR  1.23*     Recent Labs      10/09/18   1840   BNPBTYPENAT  89     Recent Labs      10/10/18   0619   TRIGLYCERIDE  75   HDL  80   LDL  39          Assessment/Plan     * Femur fracture (HCC)- (present on admission)   Assessment & Plan    Right sided fracture, intertrochanteric   S/p intramedullary mikel 10/10;  Post op care; SCD for DVT proph, no blood thinner due to severe anemia;  PT/OT.  SNF referral  Spoke with daughter at bedside re care plan.        Anemia- (present on admission)   Assessment & Plan    With severe iron deficiency; patient denies  known GI/internal bleed however.  H/H lower after IVF today; transfuse with 2 units PRBC and f/u cbc's; check stool guiacs  Hold ASA; empiric PPI; will need eventual GI evaluation, likely as outpatient unless has active bleeding here.  Start iron supplement.        Delirium   Assessment & Plan    Post op, likely due to narcotic and acute illness. No h/o dementia per daughter.  Minimize narcotic, re orient, supportive care and monitor.  Non focal exam at this time.        Elevated troponin- (present on admission)   Assessment & Plan    Mild no chest pain, likely due to severe dehydration and demand ischemia  No chest pain, no shortness of breath, non specific ekg changes  Trending down.  Holding ASA for now due to severe anemia.        Elevated BUN- (present on admission)   Assessment & Plan    Due to dehydration, improved.  Continue with IVF for now        Rhabdomyolysis- (present on admission)   Assessment & Plan    Mild, Due to being down for 1 day; improving  Will continue with IVF   Repeat labs in am         Hypokalemia- (present on admission)   Assessment & Plan    Replace and recheck         HTN- (present on admission)   Assessment & Plan    Resume home amlodipine          Quality-Core Measures

## 2018-10-11 NOTE — CARE PLAN
Problem: Pain Management  Goal: Pain level will decrease to patient's comfort goal  Will manage pain with PRN pain meds and ice packs.     Problem: Mobility  Goal: Risk for activity intolerance will decrease  Will ensure pt is up for meals.

## 2018-10-11 NOTE — THERAPY
"Occupational Therapy Evaluation completed.   Functional Status:  Pt presents to skilled OT services following fall resulting in R IT fx s/p IMN, WBAT. Pt performed LB dressing with max a, UB dressing sba, STS from eob with min a initially with fatigue mod a required, tolerated 4-5STE forward/backwards using FWW and min a with mod vc's for body mechanics and encouragement to wb through RLE. Pt demonstrates limited knowledge of post op precautions resulting in hesitancy and guarding during functional activities. Pt demonstrates impaired activity tolerance, balance, pain and generalized weakness post op. Pt would benefit from acute skilled services and post acute recommended.    Plan of Care: Will benefit from Occupational Therapy 3 times per week  Discharge Recommendations:  Equipment: Will Continue to Assess for Equipment Needs. Post-acute therapy Discharge to a transitional care facility for continued skilled therapy services.    See \"Rehab Therapy-Acute\" Patient Summary Report for complete documentation.    "

## 2018-10-11 NOTE — DISCHARGE PLANNING
Received Choice form at 3005  Agency/Facility Name: [facility]  Referral sent per Choice form @ 0752

## 2018-10-12 ENCOUNTER — APPOINTMENT (OUTPATIENT)
Dept: CARDIOLOGY | Facility: MEDICAL CENTER | Age: 83
DRG: 481 | End: 2018-10-12
Attending: INTERNAL MEDICINE
Payer: MEDICARE

## 2018-10-12 PROBLEM — M62.82 RHABDOMYOLYSIS: Status: RESOLVED | Noted: 2018-10-09 | Resolved: 2018-10-12

## 2018-10-12 PROBLEM — R79.9 ELEVATED BUN: Status: RESOLVED | Noted: 2018-10-09 | Resolved: 2018-10-12

## 2018-10-12 LAB
ANION GAP SERPL CALC-SCNC: 10 MMOL/L (ref 0–11.9)
BASOPHILS # BLD AUTO: 0.2 % (ref 0–1.8)
BASOPHILS # BLD: 0.02 K/UL (ref 0–0.12)
BUN SERPL-MCNC: 15 MG/DL (ref 8–22)
CALCIUM SERPL-MCNC: 8.6 MG/DL (ref 8.5–10.5)
CHLORIDE SERPL-SCNC: 110 MMOL/L (ref 96–112)
CO2 SERPL-SCNC: 22 MMOL/L (ref 20–33)
CREAT SERPL-MCNC: 0.74 MG/DL (ref 0.5–1.4)
EOSINOPHIL # BLD AUTO: 0.14 K/UL (ref 0–0.51)
EOSINOPHIL NFR BLD: 1.1 % (ref 0–6.9)
ERYTHROCYTE [DISTWIDTH] IN BLOOD BY AUTOMATED COUNT: 54.6 FL (ref 35.9–50)
GLUCOSE SERPL-MCNC: 95 MG/DL (ref 65–99)
HCT VFR BLD AUTO: 26.2 % (ref 37–47)
HGB BLD-MCNC: 8 G/DL (ref 12–16)
IMM GRANULOCYTES # BLD AUTO: 0.29 K/UL (ref 0–0.11)
IMM GRANULOCYTES NFR BLD AUTO: 2.2 % (ref 0–0.9)
LV EJECT FRACT  99904: 70
LV EJECT FRACT MOD 2C 99903: 76.27
LV EJECT FRACT MOD 4C 99902: 75.71
LV EJECT FRACT MOD BP 99901: 75.53
LYMPHOCYTES # BLD AUTO: 2.8 K/UL (ref 1–4.8)
LYMPHOCYTES NFR BLD: 21.2 % (ref 22–41)
MAGNESIUM SERPL-MCNC: 1.9 MG/DL (ref 1.5–2.5)
MCH RBC QN AUTO: 23.1 PG (ref 27–33)
MCHC RBC AUTO-ENTMCNC: 30.5 G/DL (ref 33.6–35)
MCV RBC AUTO: 75.7 FL (ref 81.4–97.8)
MONOCYTES # BLD AUTO: 1.05 K/UL (ref 0–0.85)
MONOCYTES NFR BLD AUTO: 8 % (ref 0–13.4)
NEUTROPHILS # BLD AUTO: 8.89 K/UL (ref 2–7.15)
NEUTROPHILS NFR BLD: 67.3 % (ref 44–72)
NRBC # BLD AUTO: 0.05 K/UL
NRBC BLD-RTO: 0.4 /100 WBC
PLATELET # BLD AUTO: 344 K/UL (ref 164–446)
PMV BLD AUTO: 9.5 FL (ref 9–12.9)
POTASSIUM SERPL-SCNC: 3.2 MMOL/L (ref 3.6–5.5)
RBC # BLD AUTO: 3.46 M/UL (ref 4.2–5.4)
SODIUM SERPL-SCNC: 142 MMOL/L (ref 135–145)
WBC # BLD AUTO: 13.2 K/UL (ref 4.8–10.8)

## 2018-10-12 PROCEDURE — A9270 NON-COVERED ITEM OR SERVICE: HCPCS | Performed by: INTERNAL MEDICINE

## 2018-10-12 PROCEDURE — 97116 GAIT TRAINING THERAPY: CPT

## 2018-10-12 PROCEDURE — 93306 TTE W/DOPPLER COMPLETE: CPT

## 2018-10-12 PROCEDURE — 85025 COMPLETE CBC W/AUTO DIFF WBC: CPT

## 2018-10-12 PROCEDURE — 99232 SBSQ HOSP IP/OBS MODERATE 35: CPT | Performed by: INTERNAL MEDICINE

## 2018-10-12 PROCEDURE — 36415 COLL VENOUS BLD VENIPUNCTURE: CPT

## 2018-10-12 PROCEDURE — 80048 BASIC METABOLIC PNL TOTAL CA: CPT

## 2018-10-12 PROCEDURE — 700102 HCHG RX REV CODE 250 W/ 637 OVERRIDE(OP): Performed by: INTERNAL MEDICINE

## 2018-10-12 PROCEDURE — 51798 US URINE CAPACITY MEASURE: CPT

## 2018-10-12 PROCEDURE — 97530 THERAPEUTIC ACTIVITIES: CPT

## 2018-10-12 PROCEDURE — 770006 HCHG ROOM/CARE - MED/SURG/GYN SEMI*

## 2018-10-12 PROCEDURE — A9270 NON-COVERED ITEM OR SERVICE: HCPCS | Performed by: HOSPITALIST

## 2018-10-12 PROCEDURE — 83735 ASSAY OF MAGNESIUM: CPT

## 2018-10-12 PROCEDURE — 93306 TTE W/DOPPLER COMPLETE: CPT | Mod: 26 | Performed by: INTERNAL MEDICINE

## 2018-10-12 PROCEDURE — 700102 HCHG RX REV CODE 250 W/ 637 OVERRIDE(OP): Performed by: HOSPITALIST

## 2018-10-12 RX ORDER — AMLODIPINE BESYLATE 5 MG/1
5 TABLET ORAL DAILY
Status: DISCONTINUED | OUTPATIENT
Start: 2018-10-13 | End: 2018-10-15 | Stop reason: HOSPADM

## 2018-10-12 RX ORDER — POTASSIUM CHLORIDE 20 MEQ/1
20 TABLET, EXTENDED RELEASE ORAL 2 TIMES DAILY
Status: DISCONTINUED | OUTPATIENT
Start: 2018-10-12 | End: 2018-10-15 | Stop reason: HOSPADM

## 2018-10-12 RX ADMIN — CITALOPRAM HYDROBROMIDE 20 MG: 20 TABLET ORAL at 05:45

## 2018-10-12 RX ADMIN — OMEPRAZOLE 20 MG: 20 CAPSULE, DELAYED RELEASE ORAL at 05:45

## 2018-10-12 RX ADMIN — POTASSIUM CHLORIDE 20 MEQ: 1500 TABLET, EXTENDED RELEASE ORAL at 17:01

## 2018-10-12 RX ADMIN — FERROUS SULFATE TAB 325 MG (65 MG ELEMENTAL FE) 325 MG: 325 (65 FE) TAB at 07:59

## 2018-10-12 RX ADMIN — ROSUVASTATIN CALCIUM 40 MG: 20 TABLET, FILM COATED ORAL at 17:01

## 2018-10-12 RX ADMIN — POTASSIUM CHLORIDE 20 MEQ: 1500 TABLET, EXTENDED RELEASE ORAL at 05:45

## 2018-10-12 RX ADMIN — AMLODIPINE BESYLATE 10 MG: 10 TABLET ORAL at 05:45

## 2018-10-12 RX ADMIN — FERROUS SULFATE TAB 325 MG (65 MG ELEMENTAL FE) 325 MG: 325 (65 FE) TAB at 17:01

## 2018-10-12 ASSESSMENT — ENCOUNTER SYMPTOMS
BLOOD IN STOOL: 0
HEARTBURN: 0
FEVER: 0
DEPRESSION: 0
DIZZINESS: 0
COUGH: 0
BLURRED VISION: 0

## 2018-10-12 ASSESSMENT — COGNITIVE AND FUNCTIONAL STATUS - GENERAL
STANDING UP FROM CHAIR USING ARMS: A LITTLE
TURNING FROM BACK TO SIDE WHILE IN FLAT BAD: A LOT
SUGGESTED CMS G CODE MODIFIER MOBILITY: CL
WALKING IN HOSPITAL ROOM: A LITTLE
MOVING FROM LYING ON BACK TO SITTING ON SIDE OF FLAT BED: A LOT
MOBILITY SCORE: 14
MOVING TO AND FROM BED TO CHAIR: A LOT
CLIMB 3 TO 5 STEPS WITH RAILING: A LOT

## 2018-10-12 ASSESSMENT — PAIN SCALES - GENERAL
PAINLEVEL_OUTOF10: 0

## 2018-10-12 ASSESSMENT — GAIT ASSESSMENTS
DEVIATION: ANTALGIC;STEP TO;DECREASED BASE OF SUPPORT;SHUFFLED GAIT;DECREASED HEEL STRIKE;DECREASED TOE OFF
ASSISTIVE DEVICE: FRONT WHEEL WALKER
DISTANCE (FEET): 10
GAIT LEVEL OF ASSIST: CONTACT GUARD ASSIST

## 2018-10-12 NOTE — PROGRESS NOTES
S:  Seen and examined.  POD #1 s/p R hip nail.  Doing well this evening.  No new complaints, pain controlled.    O: Blood pressure 125/71, pulse 79, temperature 36.2 °C (97.1 °F), resp. rate 16, height 1.524 m (5'), weight 51.3 kg (113 lb 1.5 oz), SpO2 96 %, not currently breastfeeding..    Intake/Output Summary (Last 24 hours) at 10/11/18 2006  Last data filed at 10/11/18 1300   Gross per 24 hour   Intake          1403.33 ml   Output              940 ml   Net           463.33 ml   .    Operative/injured extremity examined.  Compartments soft, distal light touch sensation intact, firing EHL/TA/GS/P.  Toes warm, well-perfused.  Wound dressing c/d/i    Recent Labs      10/09/18   1840  10/10/18   0619  10/10/18   1619  10/11/18   0119   WBC  16.6*  12.4*   --   11.7*   RBC  3.44*  3.00*   --   3.24*   HEMOGLOBIN  7.2*  6.0*  7.0*  7.6*   HEMATOCRIT  24.4*  21.6*  23.6*  24.1*   MCV  70.9*  72.0*   --   74.4*   MCH  20.9*  20.0*   --   23.5*   MCHC  29.5*  27.8*   --   31.5*   RDW  47.2  48.9   --   53.1*   PLATELETCT  565*  439   --   320   MPV  9.5  8.9*   --   9.0       A/P:    POD #1 s/p R hip nail    Antibiotics: None required  Activity: WBAT operative extremity.  PT today.  Diet: General  DVT: Mechanical (SCDs) + Pharmacologic (Lovenox)  Dispo: D/C planning

## 2018-10-12 NOTE — PROGRESS NOTES
[facility]ist Progress Note    Date of Service: 10/12/2018    Chief Complaint  83 y.o. female admitted 10/9/2018 with R hip fracture after GLF at home.    Interval Problem Update  Still mildly confused but pleasant; some surgical pain but not severe; no new problems.  Had dark stool yesterday, on iron, but guiac negative.    Consultants/Specialty  ortho    Disposition  snf        Review of Systems   Constitutional: Negative for fever.   HENT: Negative for hearing loss.    Eyes: Negative for blurred vision.   Respiratory: Negative for cough.    Cardiovascular: Negative for chest pain.   Gastrointestinal: Negative for blood in stool, heartburn and melena.   Genitourinary: Negative for dysuria.   Musculoskeletal: Positive for joint pain.   Skin: Negative for rash.   Neurological: Negative for dizziness.   Psychiatric/Behavioral: Negative for depression.      Physical Exam  Laboratory/Imaging   Hemodynamics  Temp (24hrs), Av.4 °C (97.5 °F), Min:36.2 °C (97.1 °F), Max:36.8 °C (98.3 °F)   Temperature: 36.4 °C (97.5 °F)  Pulse  Av.6  Min: 79  Max: 113   Blood Pressure : (!) 97/59      Respiratory      Respiration: 14, Pulse Oximetry: 95 %        RUL Breath Sounds: Diminished, RML Breath Sounds: Diminished, RLL Breath Sounds: Diminished, INGRID Breath Sounds: Diminished, LLL Breath Sounds: Diminished    Fluids    Intake/Output Summary (Last 24 hours) at 10/12/18 1024  Last data filed at 10/12/18 0808   Gross per 24 hour   Intake              240 ml   Output                0 ml   Net              240 ml       Nutrition  Orders Placed This Encounter   Procedures   • Diet Order Regular     Standing Status:   Standing     Number of Occurrences:   1     Order Specific Question:   Diet:     Answer:   Regular [1]     Physical Exam   Constitutional: No distress.   Thin, pale, elderly female   HENT:   Head: Normocephalic and atraumatic.   Eyes: Pupils are equal, round, and reactive to light. EOM are normal.   Neck: Neck  supple.   Cardiovascular: Normal rate and regular rhythm.    Murmur heard.  3/6 syst murmur at apex   Pulmonary/Chest: Effort normal and breath sounds normal. No respiratory distress.   Abdominal: Soft. Bowel sounds are normal. She exhibits no distension. There is no tenderness.   Musculoskeletal: She exhibits no edema.   RLE internally rotated and shortened   Neurological: She is alert. She exhibits normal muscle tone.   Mild confusion, alert and oriented to name/place/month but not year.     Skin: Skin is warm.   Psychiatric: Her behavior is normal.       Recent Labs      10/10/18   0619  10/10/18   1619  10/11/18   0119  10/12/18   0430   WBC  12.4*   --   11.7*  13.2*   RBC  3.00*   --   3.24*  3.46*   HEMOGLOBIN  6.0*  7.0*  7.6*  8.0*   HEMATOCRIT  21.6*  23.6*  24.1*  26.2*   MCV  72.0*   --   74.4*  75.7*   MCH  20.0*   --   23.5*  23.1*   MCHC  27.8*   --   31.5*  30.5*   RDW  48.9   --   53.1*  54.6*   PLATELETCT  439   --   320  344   MPV  8.9*   --   9.0  9.5     Recent Labs      10/10/18   0619  10/11/18   0119  10/12/18   0430   SODIUM  149*  141  142   POTASSIUM  3.3*  3.4*  3.2*   CHLORIDE  115*  111  110   CO2  22  20  22   GLUCOSE  123*  179*  95   BUN  32*  24*  15   CREATININE  0.88  0.74  0.74   CALCIUM  9.1  8.6  8.6     Recent Labs      10/09/18   1840   APTT  27.2   INR  1.23*     Recent Labs      10/09/18   1840   BNPBTYPENAT  89     Recent Labs      10/10/18   0619   TRIGLYCERIDE  75   HDL  80   LDL  39          Assessment/Plan     * Femur fracture (HCC)- (present on admission)   Assessment & Plan    Right sided fracture, intertrochanteric   S/p intramedullary mikel 10/10;  Post op care; SCD for DVT proph, no blood thinner due to severe anemia;  PT/OT.  SNF referral          Anemia- (present on admission)   Assessment & Plan    With severe iron deficiency; patient denies known GI/internal bleed however.  guiac negative here.  H/H a little better today; on FeSo4  Hold ASA; empiric PPI; will  need eventual GI evaluation, likely as outpatient unless has active bleeding here.          Delirium   Assessment & Plan    Post op, likely due to narcotic and acute illness. No h/o dementia per daughter.  Minimize narcotic, re orient, supportive care and monitor.  Non focal exam at this time.        Elevated troponin- (present on admission)   Assessment & Plan    Mild no chest pain, likely due to severe dehydration and demand ischemia  No chest pain, no shortness of breath, non specific ekg changes  Trending down.  Holding ASA for now due to severe anemia.        Hypokalemia- (present on admission)   Assessment & Plan    Replace and recheck; Mg ok        HTN- (present on admission)   Assessment & Plan    Resume home amlodipine          Quality-Core Measures

## 2018-10-12 NOTE — CARE PLAN
Problem: Bowel/Gastric:  Goal: Normal bowel function is maintained or improved  Black stool per CNA and night RN. Stool sample sent, Hgb at 8, VS WNL. No sign of active bleed from wound site. Will inform MD of black stool.     Problem: Fluid Volume:  Goal: Will maintain balanced intake and output  Outcome: PROGRESSING AS EXPECTED  Urine output hesitancy during the night. Some output this AM, will bladder scan once back in bed.

## 2018-10-12 NOTE — PROGRESS NOTES
Pt's bladder scan was at 466.  Patient refused to have straight catheter placed.  Pt also became agitated when education was provided along with SHERRELL Jamil.  Will attempt during the am.

## 2018-10-12 NOTE — PROGRESS NOTES
[facility] Progress Note    Interval changes:nonadherent with nursing, concern for delirium vs dementia    ROS - Patient denies any new issues. No chest pain, dyspnea, or fever.  Pain well controlled.    Blood pressure 111/64, pulse 89, temperature 36.8 °C (98.3 °F), resp. rate 18, height 1.524 m (5'), weight 51.3 kg (113 lb 1.5 oz), SpO2 90 %, not currently breastfeeding.    Patient seen and examined  No acute distress  Breathing non labored  RRR  Surgical dressing is clean, dry, and intact. Patient clearly fires tibialis anterior, EHL, and gastrocnemius/soleus. Sensation is intact to light touch throughout superficial peroneal, deep peroneal, tibial, saphenous, and sural nerve distributions. Strong and palpable 2+ dorsalis pedis and posterior tibial pulses with capillary refill less than 2 seconds. No lower leg tenderness or discomfort.    Recent Labs      10/10/18   0619  10/10/18   1619  10/11/18   0119  10/12/18   0430   WBC  12.4*   --   11.7*  13.2*   RBC  3.00*   --   3.24*  3.46*   HEMOGLOBIN  6.0*  7.0*  7.6*  8.0*   HEMATOCRIT  21.6*  23.6*  24.1*  26.2*   MCV  72.0*   --   74.4*  75.7*   MCH  20.0*   --   23.5*  23.1*   MCHC  27.8*   --   31.5*  30.5*   RDW  48.9   --   53.1*  54.6*   PLATELETCT  439   --   320  344   MPV  8.9*   --   9.0  9.5       Active Hospital Problems    Diagnosis   • Anemia [D64.9]     Priority: High   • Delirium [R41.0]     Priority: Medium   • Elevated troponin [R74.8]     Priority: Medium   • Rhabdomyolysis [M62.82]   • Femur fracture (HCC) [S72.90XA]   • Elevated BUN [R79.9]   • Hypokalemia [E87.6]   • HTN [I10]       Assessment/Plan:  POD#2 S/P R hip nail  Wt bearing status - AT  PT/OT-initiated  Wound care:dressing change later today  Drains - out  Huizar-no  Sutures/Staples out- 10-14 days post operatively  Antibiotics: complete  DVT Prophylaxis- TEDS/SCDs/Foot pumps.   Lovenox: Start 30 mg daily, Duration-until ambulatory > 150'  Future Procedures - not  anticipated  Case Coordination for Discharge Planning - Disposition may need  consult

## 2018-10-12 NOTE — THERAPY
"Pt w/impaired functional mobility. Pt appears slightly confused, affecting her mobility at times. Pt demonstrated poor wt shift through RLE duirng standing mobility, requiring verbal cuing. Pt requiring extra time to process information. Pt would benefit from further acute PT txs to progress towards goals and independence. Recommend a post acute stay at an inpatient transitional care facility to address deficits.    Physical Therapy Treatment completed.   Bed Mobility:  Supine to Sit: Minimal Assist  Transfers: Sit to Stand: Contact Guard Assist  Gait: Level Of Assist: Contact Guard Assist with Front-Wheel Walker 10ft      Plan of Care: Will benefit from Physical Therapy 3 times per week    See \"Rehab Therapy-Acute\" Patient Summary Report for complete documentation.       "

## 2018-10-12 NOTE — DISCHARGE PLANNING
Agency/Facility Name: [facility] for status of referral    Agency/Facility Name: [facility]  LM with Tg for status of referral

## 2018-10-12 NOTE — PROGRESS NOTES
Physical Therapy Treatment Note       07/16/18 15:20 to 15:50   Pain Assessment   Pain Assessment No/denies pain at rest  Reported 4/10 right lateral thigh pain which radiated to groin and knee   Pain Score See Above -> a cold pack was placed on right lateral thigh at end of PT tx session  Pt reports she was already given pain meds   Precautions   Total Hip Replacement ADduction; Internal rotation; Flexion  (Pt Indepenedent with recall of Right THP's)   Restrictions/Precautions   Weight Bearing Precautions Per Order Yes   RLE Weight Bearing Per Order WBAT  (RLE)   General   Chart Reviewed Yes   Additional Pertinent History (Pt owns a RW and NBQC)   Response to Previous Treatment Patient with no complaints from previous session  Family/Caregiver Present No   Cognition   Overall Cognitive Status WFL   Arousal/Participation Alert; Cooperative   Attention Within functional limits   Memory Within functional limits   Following Commands Follows all commands and directions without difficulty   Bed Mobility   Supine to Sit 6  Modified independent   Additional items (Practice on left and right sides of bed ( HOB flat, no rail))   Sit to Supine 6  Modified independent  (Practice on left and right sides of bed ( HOB flat, no rail))   Additional Comments (Used leg  to assit RLE onot and off of bed)   Transfers   Sit to Stand 6  Modified independent  (EOB, recliner, unsecured chair)   Additional items (Good hand placement)   Stand to Sit 6  Modified independent  (Controlled descent onto EOB, recliner, unsecured chair,)   Additional items (EOB, recliner, unsecured chair, controlled descent)   Stand pivot 6  Modified independent  (SPT with RW (  EOB, recliner, unsecured chair))   Ambulation/Elevation   Gait pattern Narrow JOSE EDUARDO; Inconsistent natacha; Short stride  (Adequate bialteral foot clearance)   Gait Assistance 6  Modified independent   Additional items (Amb with a RW for 304 feet, 30 feet, 187 feet, with MOD I)   Stair Pt is AAO x4.  Denies pain or discomfort at this time.  R hip dressing in place, CDI.  VS WNL.  L PIV patent, running fluids.  Pt up in chair for bfast.   POC discussed.  All needs met at this time.  Bed in low position.  Call light within reach.  Rounding in place.    Management Technique One rail R;Step to pattern  (NBQC)   Number of Stairs (A full flight of 12 steps and cues for sequencing)   Balance   Static Sitting (Good+)   Dynamic Sitting (Fair ( to maintain right THP's))   Static Standing (Fair+ with RW)   Dynamic Standing (Fair with RW)   Ambulatory ([address] with RW)   Endurance Deficit   Endurance Deficit Description (Slightly SOB post elevation tng and increased amb distance)   Activity Tolerance   Activity Tolerance Patient tolerated treatment well;Patient limited by fatigue   Nurse Made Aware (RN aware of pt's medical status)   Assessment   Prognosis Excellent   Problem List Decreased strength;Decreased range of motion;Decreased endurance; Impaired balance;Decreased mobility; Impaired vision;Decreased skin integrity;Orthopedic restrictions;Pain   Assessment Improvement assessed with activity tolerance ( ambulated 304 feet this PM), sit baalnce and stand balance  Pt able to get in and OOB on either side at home  Therefore practiced bed mobility on both sides of bed with MOD I ( easier to get into bed on right side and OOB on left side using right  leg  for all transfers)  Pt requested to practice a full flight of steps  Pt automatically ascended 1st 4 steps with RLE ( affected side)  This PT recommended pt ascend with LLE ( unaffected side)  Pt laughed and couldn't believe she  ascended " with the wrong leg"  Despite this, pt appeared very safe and steady on steps  Pt sequenced perfectly with descention  Vitals stable t/o entire PT tx session: Seated at rest: /57 ( no c/o dizziness with lower BP), HR 82, SPO2 (RA) 99%, after ambulating with a RW for 304 feet ( seated): SPO2 (RA) 99%, HR 84, after elevation training on full flight of 12 steps (seated): SPO2 (RA) 98%, HR 90  Pt maintained Right THP's  s 100% of time t/o entire PT tx session     Barriers to Discharge (Environment deficits, lives alone, decreased social support)   Goals   Patient Goals (" Get stronger")   STG Expiration Date 07/20/18   LTG Expiration Date 07/24/18   Treatment Day (2nd day  ( PM PT tx session))   Plan   Treatment/Interventions Functional transfer training;LE strengthening/ROM; Elevations; Therapeutic exercise; Endurance training;Patient/family training;Equipment eval/education;Gait training;Bed mobility; Compensatory technique education;Spoke to nursing   Progress Progressing toward goals   PT Frequency (6x/week ( BID ))   Recommendation   Equipment Recommended (Another RW for 2nd level of the home ( current RW is 10 y/o) )     Karin Garcia, PT

## 2018-10-13 LAB
ANION GAP SERPL CALC-SCNC: 9 MMOL/L (ref 0–11.9)
BASOPHILS # BLD AUTO: 0.4 % (ref 0–1.8)
BASOPHILS # BLD: 0.05 K/UL (ref 0–0.12)
BUN SERPL-MCNC: 15 MG/DL (ref 8–22)
CALCIUM SERPL-MCNC: 8.1 MG/DL (ref 8.5–10.5)
CHLORIDE SERPL-SCNC: 108 MMOL/L (ref 96–112)
CO2 SERPL-SCNC: 22 MMOL/L (ref 20–33)
CREAT SERPL-MCNC: 0.76 MG/DL (ref 0.5–1.4)
EOSINOPHIL # BLD AUTO: 0.23 K/UL (ref 0–0.51)
EOSINOPHIL NFR BLD: 1.9 % (ref 0–6.9)
ERYTHROCYTE [DISTWIDTH] IN BLOOD BY AUTOMATED COUNT: 56.3 FL (ref 35.9–50)
GLUCOSE SERPL-MCNC: 111 MG/DL (ref 65–99)
HCT VFR BLD AUTO: 27.1 % (ref 37–47)
HGB BLD-MCNC: 8.4 G/DL (ref 12–16)
IMM GRANULOCYTES # BLD AUTO: 0.41 K/UL (ref 0–0.11)
IMM GRANULOCYTES NFR BLD AUTO: 3.4 % (ref 0–0.9)
LYMPHOCYTES # BLD AUTO: 2.83 K/UL (ref 1–4.8)
LYMPHOCYTES NFR BLD: 23.5 % (ref 22–41)
MCH RBC QN AUTO: 23.6 PG (ref 27–33)
MCHC RBC AUTO-ENTMCNC: 31 G/DL (ref 33.6–35)
MCV RBC AUTO: 76.1 FL (ref 81.4–97.8)
MONOCYTES # BLD AUTO: 0.97 K/UL (ref 0–0.85)
MONOCYTES NFR BLD AUTO: 8 % (ref 0–13.4)
NEUTROPHILS # BLD AUTO: 7.56 K/UL (ref 2–7.15)
NEUTROPHILS NFR BLD: 62.8 % (ref 44–72)
NRBC # BLD AUTO: 0.04 K/UL
NRBC BLD-RTO: 0.3 /100 WBC
PLATELET # BLD AUTO: 322 K/UL (ref 164–446)
PMV BLD AUTO: 9.4 FL (ref 9–12.9)
POTASSIUM SERPL-SCNC: 3.5 MMOL/L (ref 3.6–5.5)
RBC # BLD AUTO: 3.56 M/UL (ref 4.2–5.4)
SODIUM SERPL-SCNC: 139 MMOL/L (ref 135–145)
WBC # BLD AUTO: 12.1 K/UL (ref 4.8–10.8)

## 2018-10-13 PROCEDURE — A9270 NON-COVERED ITEM OR SERVICE: HCPCS | Performed by: HOSPITALIST

## 2018-10-13 PROCEDURE — 700102 HCHG RX REV CODE 250 W/ 637 OVERRIDE(OP): Performed by: INTERNAL MEDICINE

## 2018-10-13 PROCEDURE — 700102 HCHG RX REV CODE 250 W/ 637 OVERRIDE(OP): Performed by: HOSPITALIST

## 2018-10-13 PROCEDURE — 99232 SBSQ HOSP IP/OBS MODERATE 35: CPT | Performed by: INTERNAL MEDICINE

## 2018-10-13 PROCEDURE — 770006 HCHG ROOM/CARE - MED/SURG/GYN SEMI*

## 2018-10-13 PROCEDURE — 85025 COMPLETE CBC W/AUTO DIFF WBC: CPT

## 2018-10-13 PROCEDURE — A9270 NON-COVERED ITEM OR SERVICE: HCPCS | Performed by: INTERNAL MEDICINE

## 2018-10-13 PROCEDURE — 36415 COLL VENOUS BLD VENIPUNCTURE: CPT

## 2018-10-13 PROCEDURE — 80048 BASIC METABOLIC PNL TOTAL CA: CPT

## 2018-10-13 RX ADMIN — ROSUVASTATIN CALCIUM 40 MG: 20 TABLET, FILM COATED ORAL at 17:16

## 2018-10-13 RX ADMIN — FERROUS SULFATE TAB 325 MG (65 MG ELEMENTAL FE) 325 MG: 325 (65 FE) TAB at 08:29

## 2018-10-13 RX ADMIN — OMEPRAZOLE 20 MG: 20 CAPSULE, DELAYED RELEASE ORAL at 06:30

## 2018-10-13 RX ADMIN — FERROUS SULFATE TAB 325 MG (65 MG ELEMENTAL FE) 325 MG: 325 (65 FE) TAB at 17:16

## 2018-10-13 RX ADMIN — POTASSIUM CHLORIDE 20 MEQ: 1500 TABLET, EXTENDED RELEASE ORAL at 06:31

## 2018-10-13 RX ADMIN — CITALOPRAM HYDROBROMIDE 20 MG: 20 TABLET ORAL at 06:31

## 2018-10-13 RX ADMIN — AMLODIPINE BESYLATE 5 MG: 5 TABLET ORAL at 06:31

## 2018-10-13 RX ADMIN — POTASSIUM CHLORIDE 20 MEQ: 1500 TABLET, EXTENDED RELEASE ORAL at 17:16

## 2018-10-13 ASSESSMENT — PAIN SCALES - GENERAL
PAINLEVEL_OUTOF10: 0

## 2018-10-13 ASSESSMENT — ENCOUNTER SYMPTOMS
COUGH: 0
BLOOD IN STOOL: 0
DIZZINESS: 0
BLURRED VISION: 0
HEARTBURN: 0
DEPRESSION: 0
FEVER: 0

## 2018-10-13 NOTE — CARE PLAN
Problem: Safety  Goal: Will remain free from falls  Outcome: PROGRESSING AS EXPECTED  Treaded socks in place, bed in the lowest position, bed alarm on, call light and belongings within reach, pt call for assistance appropriately    Problem: Skin Integrity  Goal: Risk for impaired skin integrity will decrease  Outcome: PROGRESSING AS EXPECTED  q2 turns, cherry risk assessment, applied barrier cream

## 2018-10-13 NOTE — PROGRESS NOTES
[facility]ist Progress Note    Date of Service: 10/13/2018    Chief Complaint  83 y.o. female admitted 10/9/2018 with R hip fracture after GLF at home.    Interval Problem Update  Mental status much better, near baseline; no pain; sitting up in chair; eating ok; denies new problems.    Consultants/Specialty  ortho    Disposition  snf        Review of Systems   Constitutional: Negative for fever.   HENT: Negative for hearing loss.    Eyes: Negative for blurred vision.   Respiratory: Negative for cough.    Cardiovascular: Negative for chest pain.   Gastrointestinal: Negative for blood in stool, heartburn and melena.   Genitourinary: Negative for dysuria.   Musculoskeletal: Positive for joint pain.   Skin: Negative for rash.   Neurological: Negative for dizziness.   Psychiatric/Behavioral: Negative for depression.      Physical Exam  Laboratory/Imaging   Hemodynamics  Temp (24hrs), Av.7 °C (98.1 °F), Min:36.4 °C (97.6 °F), Max:36.8 °C (98.3 °F)   Temperature: 36.8 °C (98.3 °F)  Pulse  Av.4  Min: 71  Max: 113   Blood Pressure : (!) 93/45      Respiratory      Respiration: 18, Pulse Oximetry: 95 %        RUL Breath Sounds: Diminished, RML Breath Sounds: Diminished, RLL Breath Sounds: Diminished, INGRID Breath Sounds: Diminished, LLL Breath Sounds: Diminished    Fluids    Intake/Output Summary (Last 24 hours) at 10/13/18 1100  Last data filed at 10/12/18 1800   Gross per 24 hour   Intake             1558 ml   Output                0 ml   Net             1558 ml       Nutrition  Orders Placed This Encounter   Procedures   • Diet Order Regular     Standing Status:   Standing     Number of Occurrences:   1     Order Specific Question:   Diet:     Answer:   Regular [1]     Physical Exam   Constitutional: She is oriented to person, place, and time. No distress.   Thin, pale, elderly female   HENT:   Head: Normocephalic and atraumatic.   Eyes: Pupils are equal, round, and reactive to light. EOM are normal.   Neck:  Neck supple.   Cardiovascular: Normal rate and regular rhythm.    Murmur heard.  3/6 syst murmur at apex   Pulmonary/Chest: Effort normal and breath sounds normal. No respiratory distress.   Abdominal: Soft. Bowel sounds are normal. She exhibits no distension. There is no tenderness.   Musculoskeletal: She exhibits no edema.    R hip wound in dressing, dry   Neurological: She is alert and oriented to person, place, and time. She exhibits normal muscle tone.       Skin: Skin is warm.   Psychiatric: Her behavior is normal.       Recent Labs      10/11/18   0119  10/12/18   0430  10/13/18   0420   WBC  11.7*  13.2*  12.1*   RBC  3.24*  3.46*  3.56*   HEMOGLOBIN  7.6*  8.0*  8.4*   HEMATOCRIT  24.1*  26.2*  27.1*   MCV  74.4*  75.7*  76.1*   MCH  23.5*  23.1*  23.6*   MCHC  31.5*  30.5*  31.0*   RDW  53.1*  54.6*  56.3*   PLATELETCT  320  344  322   MPV  9.0  9.5  9.4     Recent Labs      10/11/18   0119  10/12/18   0430  10/13/18   0420   SODIUM  141  142  139   POTASSIUM  3.4*  3.2*  3.5*   CHLORIDE  111  110  108   CO2  20  22  22   GLUCOSE  179*  95  111*   BUN  24*  15  15   CREATININE  0.74  0.74  0.76   CALCIUM  8.6  8.6  8.1*                      Assessment/Plan     * Femur fracture (HCC)- (present on admission)   Assessment & Plan    Right sided fracture, intertrochanteric   S/p intramedullary mikel 10/10;  Post op care; SCD for DVT proph, no blood thinner due to severe anemia;  PT/OT.  SNF referral          Anemia- (present on admission)   Assessment & Plan    With severe iron deficiency; patient denies known GI/internal bleed however.  guiac negative here.  H/H a little better today; on FeSo4  Hold ASA; empiric PPI; will need eventual GI evaluation, likely as outpatient unless has active bleeding here.          Delirium   Assessment & Plan    Post op, likely due to narcotic and acute illness. No h/o dementia per daughter.  Resolving, supportive care.        Elevated troponin- (present on admission)   Assessment  & Plan    Mild no chest pain, likely due to severe dehydration and demand ischemia  No chest pain, no shortness of breath, non specific ekg changes  Trending down.  Holding ASA for now due to severe anemia.  Echo ok with ef 70 %,        Hypokalemia- (present on admission)   Assessment & Plan    Replace and recheck; Mg ok        HTN- (present on admission)   Assessment & Plan    Resume home amlodipine          Quality-Core Measures

## 2018-10-13 NOTE — PROGRESS NOTES
Patient seen and examined  S/P hip nailing    Blood pressure 116/56, pulse 71, temperature 36.8 °C (98.3 °F), resp. rate 16, height 1.524 m (5'), weight 51.3 kg (113 lb 1.5 oz), SpO2 92 %, not currently breastfeeding.    Recent Labs      10/11/18   0119  10/12/18   0430  10/13/18   0420   WBC  11.7*  13.2*  12.1*   RBC  3.24*  3.46*  3.56*   HEMOGLOBIN  7.6*  8.0*  8.4*   HEMATOCRIT  24.1*  26.2*  27.1*   MCV  74.4*  75.7*  76.1*   MCH  23.5*  23.1*  23.6*   MCHC  31.5*  30.5*  31.0*   RDW  53.1*  54.6*  56.3*   PLATELETCT  320  344  322   MPV  9.0  9.5  9.4       No acute distress  Dressing clean dry and intact  Neurovascularly intact    POD#3    Plan:  DVT Prophylaxis- TEDS/SCDs, lovenox  Weight Bearing Status-WBAT  PT/OT  Antibiotics: none  Case Coordination

## 2018-10-13 NOTE — CARE PLAN
Problem: Communication  Goal: The ability to communicate needs accurately and effectively will improve    Intervention: Educate patient and significant other/support system about the plan of care, procedures, treatments, medications and allow for questions  POC discussed with pt including unit routine, call light, and medications.       Problem: Mobility  Goal: Risk for activity intolerance will decrease    Intervention: Encourage patient to increase activity level in collaboration with Interdisciplinary Team  Pt will be up in chair for all meals. Encouraged to ambulate.

## 2018-10-14 PROBLEM — R41.0 DELIRIUM: Status: RESOLVED | Noted: 2018-10-11 | Resolved: 2018-10-14

## 2018-10-14 PROCEDURE — 99232 SBSQ HOSP IP/OBS MODERATE 35: CPT | Performed by: INTERNAL MEDICINE

## 2018-10-14 PROCEDURE — A9270 NON-COVERED ITEM OR SERVICE: HCPCS | Performed by: INTERNAL MEDICINE

## 2018-10-14 PROCEDURE — 700102 HCHG RX REV CODE 250 W/ 637 OVERRIDE(OP): Performed by: INTERNAL MEDICINE

## 2018-10-14 PROCEDURE — 700102 HCHG RX REV CODE 250 W/ 637 OVERRIDE(OP): Performed by: HOSPITALIST

## 2018-10-14 PROCEDURE — A9270 NON-COVERED ITEM OR SERVICE: HCPCS | Performed by: HOSPITALIST

## 2018-10-14 PROCEDURE — 770006 HCHG ROOM/CARE - MED/SURG/GYN SEMI*

## 2018-10-14 PROCEDURE — 700111 HCHG RX REV CODE 636 W/ 250 OVERRIDE (IP): Performed by: INTERNAL MEDICINE

## 2018-10-14 RX ADMIN — CITALOPRAM HYDROBROMIDE 20 MG: 20 TABLET ORAL at 05:25

## 2018-10-14 RX ADMIN — POTASSIUM CHLORIDE 20 MEQ: 1500 TABLET, EXTENDED RELEASE ORAL at 17:28

## 2018-10-14 RX ADMIN — FERROUS SULFATE TAB 325 MG (65 MG ELEMENTAL FE) 325 MG: 325 (65 FE) TAB at 17:28

## 2018-10-14 RX ADMIN — ROSUVASTATIN CALCIUM 40 MG: 20 TABLET, FILM COATED ORAL at 17:28

## 2018-10-14 RX ADMIN — POTASSIUM CHLORIDE 20 MEQ: 1500 TABLET, EXTENDED RELEASE ORAL at 05:26

## 2018-10-14 RX ADMIN — FERROUS SULFATE TAB 325 MG (65 MG ELEMENTAL FE) 325 MG: 325 (65 FE) TAB at 05:26

## 2018-10-14 RX ADMIN — OMEPRAZOLE 20 MG: 20 CAPSULE, DELAYED RELEASE ORAL at 05:26

## 2018-10-14 RX ADMIN — ENOXAPARIN SODIUM 40 MG: 100 INJECTION SUBCUTANEOUS at 17:28

## 2018-10-14 ASSESSMENT — ENCOUNTER SYMPTOMS
COUGH: 0
HEARTBURN: 0
DEPRESSION: 0
BLURRED VISION: 0
DIZZINESS: 0
BLOOD IN STOOL: 0
FEVER: 0

## 2018-10-14 ASSESSMENT — PAIN SCALES - GENERAL
PAINLEVEL_OUTOF10: 0

## 2018-10-14 NOTE — PROGRESS NOTES
[facility]    Date of Service: 10/14/2018    Chief Complaint  83 y.o. female admitted 10/9/2018 with R hip fracture after GLF at home.    Interval Problem Update  Mental status at baseline; feels well; no pain; no new c/o    Consultants/Specialty  ortho    Disposition  snf        Review of Systems   Constitutional: Negative for fever.   HENT: Negative for hearing loss.    Eyes: Negative for blurred vision.   Respiratory: Negative for cough.    Cardiovascular: Negative for chest pain.   Gastrointestinal: Negative for blood in stool, heartburn and melena.   Genitourinary: Negative for dysuria.   Musculoskeletal: Positive for joint pain.   Skin: Negative for rash.   Neurological: Negative for dizziness.   Psychiatric/Behavioral: Negative for depression.      Physical Exam  Laboratory/Imaging   Hemodynamics  Temp (24hrs), Av.6 °C (97.8 °F), Min:36.3 °C (97.4 °F), Max:36.8 °C (98.2 °F)   Temperature: 36.4 °C (97.5 °F)  Pulse  Av.9  Min: 71  Max: 113   Blood Pressure : 107/58      Respiratory      Respiration: 16, Pulse Oximetry: 94 %        RUL Breath Sounds: Diminished, RML Breath Sounds: Diminished, RLL Breath Sounds: Diminished, INGRID Breath Sounds: Diminished, LLL Breath Sounds: Diminished    Fluids    Intake/Output Summary (Last 24 hours) at 10/14/18 1441  Last data filed at 10/14/18 1400   Gross per 24 hour   Intake              240 ml   Output                0 ml   Net              240 ml       Nutrition  Orders Placed This Encounter   Procedures   • Diet Order Regular     Standing Status:   Standing     Number of Occurrences:   1     Order Specific Question:   Diet:     Answer:   Regular [1]     Physical Exam   Constitutional: She is oriented to person, place, and time. No distress.   Thin, pale, elderly female   HENT:   Head: Normocephalic and atraumatic.   Eyes: Pupils are equal, round, and reactive to light. EOM are normal.   Neck: Neck supple.   Cardiovascular: Normal rate and  regular rhythm.    Murmur heard.  3/6 syst murmur at apex   Pulmonary/Chest: Effort normal and breath sounds normal. No respiratory distress.   Abdominal: Soft. Bowel sounds are normal. She exhibits no distension. There is no tenderness.   Musculoskeletal: She exhibits no edema.    R hip wound in dressing, dry   Neurological: She is alert and oriented to person, place, and time. She exhibits normal muscle tone.       Skin: Skin is warm.   Psychiatric: Her behavior is normal.       Recent Labs      10/12/18   0430  10/13/18   0420   WBC  13.2*  12.1*   RBC  3.46*  3.56*   HEMOGLOBIN  8.0*  8.4*   HEMATOCRIT  26.2*  27.1*   MCV  75.7*  76.1*   MCH  23.1*  23.6*   MCHC  30.5*  31.0*   RDW  54.6*  56.3*   PLATELETCT  344  322   MPV  9.5  9.4     Recent Labs      10/12/18   0430  10/13/18   0420   SODIUM  142  139   POTASSIUM  3.2*  3.5*   CHLORIDE  110  108   CO2  22  22   GLUCOSE  95  111*   BUN  15  15   CREATININE  0.74  0.76   CALCIUM  8.6  8.1*                      Assessment/Plan     * Femur fracture (HCC)- (present on admission)   Assessment & Plan    Right sided fracture, intertrochanteric   S/p intramedullary mikel 10/10;  Post op care; since anemia is stable, start lovenox for DVT prophylaxis and monitor  PT/OT.  SNF referral          Anemia- (present on admission)   Assessment & Plan    With severe iron deficiency; patient denies known GI/internal bleed however.  guiac negative here.  H/H a little better today; on FeSo4  Hold ASA; empiric PPI; will need eventual GI evaluation, likely as outpatient unless has active bleeding here.          Elevated troponin- (present on admission)   Assessment & Plan    Mild no chest pain, likely due to severe dehydration and demand ischemia  No chest pain, no shortness of breath, non specific ekg changes  Trending down.  Holding ASA for now due to severe anemia.  Echo ok with ef 70 %,        Hypokalemia- (present on admission)   Assessment & Plan    Replace and recheck; Mg ok         HTN- (present on admission)   Assessment & Plan    Resume home amlodipine          Quality-Core Measures

## 2018-10-14 NOTE — PROGRESS NOTES
S:  Seen and examined.  POD #4 s/p R hip nail.  Doing well this morning.  No new complaints, pain controlled.  Mental status appears to have improved.    O: Blood pressure 107/58, pulse 91, temperature 36.4 °C (97.5 °F), resp. rate 16, height 1.524 m (5'), weight 52.1 kg (114 lb 13.8 oz), SpO2 94 %, not currently breastfeeding..  No intake or output data in the 24 hours ending 10/14/18 1014.    Operative/injured extremity examined.  Compartments soft, distal light touch sensation intact, firing EHL/TA/GS/P.  Toes warm, well-perfused.  Wound c/d/i    Recent Labs      10/12/18   0430  10/13/18   0420   WBC  13.2*  12.1*   RBC  3.46*  3.56*   HEMOGLOBIN  8.0*  8.4*   HEMATOCRIT  26.2*  27.1*   MCV  75.7*  76.1*   MCH  23.1*  23.6*   MCHC  30.5*  31.0*   RDW  54.6*  56.3*   PLATELETCT  344  322   MPV  9.5  9.4       A/P:    POD #4 s/p R hip nail    Antibiotics: None required  Activity: WBAT operative extremity.  PT today.  Diet: General  DVT: Mechanical (SCDs) + Pharmacologic (Lovenox)  Dispo: D/C planning

## 2018-10-14 NOTE — CARE PLAN
Problem: Venous Thromboembolism (VTW)/Deep Vein Thrombosis (DVT) Prevention:  Goal: Patient will participate in Venous Thrombosis (VTE)/Deep Vein Thrombosis (DVT)Prevention Measures  Outcome: PROGRESSING AS EXPECTED  SCDs on.    Problem: Respiratory:  Goal: Respiratory status will improve  Outcome: PROGRESSING AS EXPECTED  Encourage the use of IS. Needs assistance.

## 2018-10-15 VITALS
RESPIRATION RATE: 17 BRPM | WEIGHT: 114.86 LBS | BODY MASS INDEX: 22.55 KG/M2 | OXYGEN SATURATION: 96 % | DIASTOLIC BLOOD PRESSURE: 45 MMHG | HEART RATE: 74 BPM | TEMPERATURE: 99.8 F | SYSTOLIC BLOOD PRESSURE: 104 MMHG | HEIGHT: 60 IN

## 2018-10-15 LAB
BASOPHILS # BLD AUTO: 0.5 % (ref 0–1.8)
BASOPHILS # BLD: 0.06 K/UL (ref 0–0.12)
EOSINOPHIL # BLD AUTO: 0.26 K/UL (ref 0–0.51)
EOSINOPHIL NFR BLD: 2.1 % (ref 0–6.9)
ERYTHROCYTE [DISTWIDTH] IN BLOOD BY AUTOMATED COUNT: 61.9 FL (ref 35.9–50)
HCT VFR BLD AUTO: 29.7 % (ref 37–47)
HGB BLD-MCNC: 9 G/DL (ref 12–16)
IMM GRANULOCYTES # BLD AUTO: 0.33 K/UL (ref 0–0.11)
IMM GRANULOCYTES NFR BLD AUTO: 2.7 % (ref 0–0.9)
LYMPHOCYTES # BLD AUTO: 2.82 K/UL (ref 1–4.8)
LYMPHOCYTES NFR BLD: 22.7 % (ref 22–41)
MCH RBC QN AUTO: 23.3 PG (ref 27–33)
MCHC RBC AUTO-ENTMCNC: 30.3 G/DL (ref 33.6–35)
MCV RBC AUTO: 76.9 FL (ref 81.4–97.8)
MONOCYTES # BLD AUTO: 1.25 K/UL (ref 0–0.85)
MONOCYTES NFR BLD AUTO: 10.1 % (ref 0–13.4)
NEUTROPHILS # BLD AUTO: 7.69 K/UL (ref 2–7.15)
NEUTROPHILS NFR BLD: 61.9 % (ref 44–72)
NRBC # BLD AUTO: 0 K/UL
NRBC BLD-RTO: 0 /100 WBC
PLATELET # BLD AUTO: 320 K/UL (ref 164–446)
PMV BLD AUTO: 9.3 FL (ref 9–12.9)
RBC # BLD AUTO: 3.86 M/UL (ref 4.2–5.4)
WBC # BLD AUTO: 12.4 K/UL (ref 4.8–10.8)

## 2018-10-15 PROCEDURE — 700102 HCHG RX REV CODE 250 W/ 637 OVERRIDE(OP): Performed by: HOSPITALIST

## 2018-10-15 PROCEDURE — A9270 NON-COVERED ITEM OR SERVICE: HCPCS | Performed by: INTERNAL MEDICINE

## 2018-10-15 PROCEDURE — 36415 COLL VENOUS BLD VENIPUNCTURE: CPT

## 2018-10-15 PROCEDURE — 700102 HCHG RX REV CODE 250 W/ 637 OVERRIDE(OP): Performed by: INTERNAL MEDICINE

## 2018-10-15 PROCEDURE — A9270 NON-COVERED ITEM OR SERVICE: HCPCS | Performed by: HOSPITALIST

## 2018-10-15 PROCEDURE — 700111 HCHG RX REV CODE 636 W/ 250 OVERRIDE (IP): Performed by: INTERNAL MEDICINE

## 2018-10-15 PROCEDURE — 85025 COMPLETE CBC W/AUTO DIFF WBC: CPT

## 2018-10-15 PROCEDURE — 99239 HOSP IP/OBS DSCHRG MGMT >30: CPT | Performed by: HOSPITALIST

## 2018-10-15 RX ORDER — OMEPRAZOLE 20 MG/1
20 CAPSULE, DELAYED RELEASE ORAL DAILY
Qty: 30 CAP
Start: 2018-10-16 | End: 2019-05-07 | Stop reason: CLARIF

## 2018-10-15 RX ORDER — FERROUS SULFATE 325(65) MG
325 TABLET ORAL DAILY
Qty: 30 TAB | Refills: 2
Start: 2018-10-15 | End: 2019-05-07 | Stop reason: CLARIF

## 2018-10-15 RX ORDER — OXYCODONE HYDROCHLORIDE 5 MG/1
5 TABLET ORAL EVERY 6 HOURS PRN
Qty: 8 TAB | Refills: 0 | Status: SHIPPED | OUTPATIENT
Start: 2018-10-15 | End: 2018-10-17

## 2018-10-15 RX ORDER — ASCORBIC ACID 500 MG
500 TABLET ORAL 2 TIMES DAILY
Start: 2018-10-15 | End: 2019-05-07 | Stop reason: CLARIF

## 2018-10-15 RX ORDER — POTASSIUM CHLORIDE 20 MEQ/1
20 TABLET, EXTENDED RELEASE ORAL DAILY
Start: 2018-10-15 | End: 2018-10-20

## 2018-10-15 RX ORDER — AMOXICILLIN 250 MG
2 CAPSULE ORAL 2 TIMES DAILY
Qty: 30 TAB | Refills: 0 | Status: SHIPPED | OUTPATIENT
Start: 2018-10-15 | End: 2019-05-07 | Stop reason: CLARIF

## 2018-10-15 RX ADMIN — FERROUS SULFATE TAB 325 MG (65 MG ELEMENTAL FE) 325 MG: 325 (65 FE) TAB at 08:41

## 2018-10-15 RX ADMIN — POTASSIUM CHLORIDE 20 MEQ: 1500 TABLET, EXTENDED RELEASE ORAL at 04:36

## 2018-10-15 RX ADMIN — SENNOSIDES AND DOCUSATE SODIUM 2 TABLET: 8.6; 5 TABLET ORAL at 04:34

## 2018-10-15 RX ADMIN — AMLODIPINE BESYLATE 5 MG: 5 TABLET ORAL at 04:34

## 2018-10-15 RX ADMIN — ENOXAPARIN SODIUM 40 MG: 100 INJECTION SUBCUTANEOUS at 04:34

## 2018-10-15 RX ADMIN — CITALOPRAM HYDROBROMIDE 20 MG: 20 TABLET ORAL at 04:35

## 2018-10-15 RX ADMIN — OMEPRAZOLE 20 MG: 20 CAPSULE, DELAYED RELEASE ORAL at 04:34

## 2018-10-15 NOTE — CARE PLAN
Problem: Communication  Goal: The ability to communicate needs accurately and effectively will improve    Intervention: Educate patient and significant other/support system about the plan of care, procedures, treatments, medications and allow for questions  POC discussed with pt including unit routine, call light, and medications      Problem: Venous Thromboembolism (VTW)/Deep Vein Thrombosis (DVT) Prevention:  Goal: Patient will participate in Venous Thrombosis (VTE)/Deep Vein Thrombosis (DVT)Prevention Measures    Intervention: Ensure patient wears graduated elastic stockings (KATE hose) and/or SCDs, if ordered, when in bed or chair (Remove at least once per shift for skin check)  SCDs on

## 2018-10-15 NOTE — CARE PLAN
Problem: Safety  Goal: Will remain free from injury    Intervention: Educate patient and significant other/support system about adaptive mobility strategies and safe transfers  1-2 assist to BSC.  Is A/O x4.  Uses call light appropriately.  Has remained free from injury.

## 2018-10-15 NOTE — DISCHARGE PLANNING
Agency/Facility Name: [facility]  LM with Tg for status of referral    [facility]  LM with admissions for status of referral

## 2018-10-15 NOTE — DISCHARGE INSTRUCTIONS
Discharge Instructions    Discharged to other by medical transportation with escort. Discharged via wheelchair, hospital escort: Yes.  Special equipment needed: Not Applicable    Be sure to schedule a follow-up appointment with your primary care doctor or any specialists as instructed.     Discharge Plan:   Pneumococcal Vaccine Administered/Refused: Not given - Patient refused pneumococcal vaccine  Influenza Vaccine Indication: Patient Refuses    I understand that a diet low in cholesterol, fat, and sodium is recommended for good health. Unless I have been given specific instructions below for another diet, I accept this instruction as my diet prescription.   Other diet: Regular    Special Instructions: Discharge instructions for the Orthopedic Patient    Follow up with Primary Care Physician within 2 weeks of discharge to home, regarding:  Review of medications and diagnostic testing.  Surveillance for medical complications.  Workup and treatment of osteoporosis, if appropriate.     -Is this a Joint Replacement patient? No    -Is this patient being discharged with medication to prevent blood clots?  Yes, Lovenox Enoxaparin injection  What is this medicine?  ENOXAPARIN (ee nox a PA rin) is used after knee, hip, or abdominal surgeries to prevent blood clotting. It is also used to treat existing blood clots in the lungs or in the veins.  This medicine may be used for other purposes; ask your health care provider or pharmacist if you have questions.  COMMON BRAND NAME(S): Lovenox  What should I tell my health care provider before I take this medicine?  They need to know if you have any of these conditions:  -bleeding disorders, hemorrhage, or hemophilia  -infection of the heart or heart valves  -kidney or liver disease  -previous stroke  -prosthetic heart valve  -recent surgery or delivery of a baby  -ulcer in the stomach or intestine, diverticulitis, or other bowel disease  -an unusual or allergic reaction to  enoxaparin, heparin, pork or pork products, other medicines, foods, dyes, or preservatives  -pregnant or trying to get pregnant  -breast-feeding  How should I use this medicine?  This medicine is for injection under the skin. It is usually given by a health-care professional. You or a family member may be trained on how to give the injections. If you are to give yourself injections, make sure you understand how to use the syringe, measure the dose if necessary, and give the injection. To avoid bruising, do not rub the site where this medicine has been injected. Do not take your medicine more often than directed. Do not stop taking except on the advice of your doctor or health care professional.  Make sure you receive a puncture-resistant container to dispose of the needles and syringes once you have finished with them. Do not reuse these items. Return the container to your doctor or health care professional for proper disposal.  Talk to your pediatrician regarding the use of this medicine in children. Special care may be needed.  Overdosage: If you think you have taken too much of this medicine contact a poison control center or emergency room at once.  NOTE: This medicine is only for you. Do not share this medicine with others.  What if I miss a dose?  If you miss a dose, take it as soon as you can. If it is almost time for your next dose, take only that dose. Do not take double or extra doses.  What may interact with this medicine?  -aspirin and aspirin-like medicines  -certain medicines that treat or prevent blood clots  -dipyridamole  -NSAIDs, medicines for pain and inflammation, like ibuprofen or naproxen  This list may not describe all possible interactions. Give your health care provider a list of all the medicines, herbs, non-prescription drugs, or dietary supplements you use. Also tell them if you smoke, drink alcohol, or use illegal drugs. Some items may interact with your medicine.  What should I watch  for while using this medicine?  Visit your doctor or health care professional for regular checks on your progress. Your condition will be monitored carefully while you are receiving this medicine.  Notify your doctor or health care professional and seek emergency treatment if you develop breathing problems; changes in vision; chest pain; severe, sudden headache; pain, swelling, warmth in the leg; trouble speaking; sudden numbness or weakness of the face, arm, or leg. These can be signs that your condition has gotten worse.  If you are going to have surgery, tell your doctor or health care professional that you are taking this medicine.  Do not stop taking this medicine without first talking to your doctor. Be sure to refill your prescription before you run out of medicine.  Avoid sports and activities that might cause injury while you are using this medicine. Severe falls or injuries can cause unseen bleeding. Be careful when using sharp tools or knives. Consider using an electric razor. Take special care brushing or flossing your teeth. Report any injuries, bruising, or red spots on the skin to your doctor or health care professional.  What side effects may I notice from receiving this medicine?  Side effects that you should report to your doctor or health care professional as soon as possible:  -allergic reactions like skin rash, itching or hives, swelling of the face, lips, or tongue  -feeling faint or lightheaded, falls  -signs and symptoms of bleeding such as bloody or black, tarry stools; red or dark-brown urine; spitting up blood or brown material that looks like coffee grounds; red spots on the skin; unusual bruising or bleeding from the eye, gums, or nose  Side effects that usually do not require medical attention (report to your doctor or health care professional if they continue or are bothersome):  -pain, redness, or irritation at site where injected  This list may not describe all possible side effects.  Call your doctor for medical advice about side effects. You may report side effects to FDA at 2-205-YSL-5894.  Where should I keep my medicine?  Keep out of the reach of children.  Store at room temperature between 15 and 30 degrees C (59 and 86 degrees F). Do not freeze. If your injections have been specially prepared, you may need to store them in the refrigerator. Ask your pharmacist. Throw away any unused medicine after the expiration date.  NOTE: This sheet is a summary. It may not cover all possible information. If you have questions about this medicine, talk to your doctor, pharmacist, or health care provider.  © 2018 [employer] Standard (2015-04-21 16:06:21)      · Is patient discharged on Warfarin / Coumadin?   No     Depression / Suicide Risk    As you are discharged from this [facility] facility, it is important to learn how to keep safe from harming yourself.    Recognize the warning signs:  · Abrupt changes in personality, positive or negative- including increase in energy   · Giving away possessions  · Change in eating patterns- significant weight changes-  positive or negative  · Change in sleeping patterns- unable to sleep or sleeping all the time   · Unwillingness or inability to communicate  · Depression  · Unusual sadness, discouragement and loneliness  · Talk of wanting to die  · Neglect of personal appearance   · Rebelliousness- reckless behavior  · Withdrawal from people/activities they love  · Confusion- inability to concentrate     If you or a loved one observes any of these behaviors or has concerns about self-harm, here's what you can do:  · Talk about it- your feelings and reasons for harming yourself  · Remove any means that you might use to hurt yourself (examples: pills, rope, extension cords, firearm)  · Get professional help from the community (Mental Health, Substance Abuse, psychological counseling)  · Do not be alone:Call your Safe Contact- someone whom you trust who will be  there for you.  · Call your local CRISIS HOTLINE 478-3105 or 354-732-5438  · Call your local Children's Mobile Crisis Response Team [place] (004) 805-2469 or www.[employer]  · Call the toll free National Suicide Prevention Hotlines   · National Suicide Prevention Lifeline 339-424-BALG (2126)  · National Lesson[employer] Line Network 800-SUICIDE (456-8709)  ·     Femoral Shaft Fracture  Introduction  A femoral shaft fracture is a break (fracture) in the shaft of the thigh bone (femur). The femur is the long bone that connects the hip joint to the knee joint. Most femoral shaft fractures are closed fractures. A closed fracture is a break in a bone that happens without any cuts (lacerations) through the skin that is near the fracture site. Some femoral shaft fractures are open fractures. An open fracture is a break in a bone that happens along with lacerations through the skin that is near the fracture site.  What are the causes?  A healthy femur may break from a forceful impact, such as from:  · A fall, especially from a great height.  · A high-impact sports injury.  · A car or motorcycle accident.  A weakened femur may break from minimal impact or force due to:  · Certain medical conditions.  · Age.  What increases the risk?  This condition is more likely to develop in:  · Older people.  · People who have certain medical conditions that cause bones to become weak or thin, such as:  ¨ Osteoporosis.  ¨ Cancer.  ¨ Osteogenesis imperfecta. This is a condition that involves bone weakness that is due to abnormal bone development.  · People who take certain medicines (bisphosphonates) that are used to treat osteoporosis.  · People who participate in high-risk sports or impact sports.  What are the signs or symptoms?  Symptoms of this condition include:  · Severe pain.  · Inability to walk.  · Bruising.  · Swelling or visible deformity of the leg.  · Substantial bleeding, if it is an open fracture.  How is this  diagnosed?  This condition is diagnosed based on your symptoms and a physical exam. You may also have other tests, including:  · X-rays of the femur. Since the force required to break a healthy femur can break other bones, X-rays are often taken of the hip, knee, and pelvis as well.  · Evaluation of the blood vessels with specialized X-rays (arteriogram).  · Evaluation of the nerves in the area of the break.  · CT scan or MRI.  How is this treated?  A femoral shaft fracture usually requires surgery. You may have one or more of the following surgical treatments:  · External fixation. This involves using pins and screws to hold the bones in place if there is extensive soft tissue injury. After some time, you may need an additional surgical treatment, such as intramedullary nailing.  · Intramedullary nailing. This involves inserting a mikel (intramedullary nail) through an incision. The intramedullary nail goes down the center of the shaft of the femur. It may be inserted in the knee joint or from the top of the femur near the hip. Generally, screws are placed through the mikel at both ends to prevent shortening or rotation of the femur as it heals.  · Plates to stabilize the fracture. These may be used, especially when the fracture is at either end of the bone, near the hip or the knee.  In rare cases for which surgery is not an option, a cast or a splint may be used to hold (immobilize) the bone while it heals.  How is this prevented?  If factors such as certain medical conditions or age increase your risk for another femoral shaft fracture, you may be able to prevent one if you follow these instructions:  · Use aids for walking, such as a walker or cane, as directed by your health care provider.  · Follow instructions from your health care provider about how to strengthen your bones if you have osteoporosis.  This information is not intended to replace advice given to you by your health care provider. Make sure you  discuss any questions you have with your health care provider.  Document Released: 09/27/2006 Document Revised: 05/25/2017 Document Reviewed: 08/03/2015  © 2017 Elsevier    Oxycodone tablets or capsules  What is this medicine?  OXYCODONE (ox i KOE done) is a pain reliever. It is used to treat moderate to severe pain.  This medicine may be used for other purposes; ask your health care provider or pharmacist if you have questions.  COMMON BRAND NAME(S): Dazidox, Endocodone, Oxaydo, OXECTA, OxyIR, Percolone, Roxicodone, ROXYBOND  What should I tell my health care provider before I take this medicine?  They need to know if you have any of these conditions:  -Haim's disease  -brain tumor  -head injury  -heart disease  -history of drug or alcohol abuse problem  -if you often drink alcohol  -kidney disease  -liver disease  -lung or breathing disease, like asthma  -mental illness  -pancreatic disease  -seizures  -thyroid disease  -an unusual or allergic reaction to oxycodone, codeine, hydrocodone, morphine, other medicines, foods, dyes, or preservatives  -pregnant or trying to get pregnant  -breast-feeding  How should I use this medicine?  Take this medicine by mouth with a glass of water. Follow the directions on the prescription label. You can take it with or without food. If it upsets your stomach, take it with food. Take your medicine at regular intervals. Do not take it more often than directed. Do not stop taking except on your doctor's advice.  Some brands of this medicine, like Oxecta, have special instructions. Ask your doctor or pharmacist if these directions are for you: Do not cut, crush or chew this medicine. Swallow only one tablet at a time. Do not wet, soak, or lick the tablet before you take it.  A special MedGuide will be given to you by the pharmacist with each prescription and refill. Be sure to read this information carefully each time.  Talk to your pediatrician regarding the use of this medicine in  children. Special care may be needed.  Overdosage: If you think you have taken too much of this medicine contact a poison control center or emergency room at once.  NOTE: This medicine is only for you. Do not share this medicine with others.  What if I miss a dose?  If you miss a dose, take it as soon as you can. If it is almost time for your next dose, take only that dose. Do not take double or extra doses.  What may interact with this medicine?  This medicine may interact with the following medications:  -alcohol  -antihistamines for allergy, cough and cold  -antiviral medicines for HIV or AIDS  -atropine  -certain antibiotics like clarithromycin, erythromycin, linezolid, rifampin  -certain medicines for anxiety or sleep  -certain medicines for bladder problems like oxybutynin, tolterodine  -certain medicines for depression like amitriptyline, fluoxetine, sertraline  -certain medicines for fungal infections like ketoconazole, itraconazole, voriconazole  -certain medicines for migraine headache like almotriptan, eletriptan, frovatriptan, naratriptan, rizatriptan, sumatriptan, zolmitriptan  -certain medicines for nausea or vomiting like dolasetron, ondansetron, palonosetron  -certain medicines for Parkinson's disease like benztropine, trihexyphenidyl  -certain medicines for seizures like phenobarbital, phenytoin, primidone  -certain medicines for stomach problems like dicyclomine, hyoscyamine  -certain medicines for travel sickness like scopolamine  -diuretics  -general anesthetics like halothane, isoflurane, methoxyflurane, propofol  -ipratropium  -local anesthetics like lidocaine, pramoxine, tetracaine  -MAOIs like Carbex, Eldepryl, Marplan, Nardil, and Parnate  -medicines that relax muscles for surgery  -methylene blue  -nilotinib  -other narcotic medicines for pain or cough  -phenothiazines like chlorpromazine, mesoridazine, prochlorperazine, thioridazine  This list may not describe all possible interactions.  Give your health care provider a list of all the medicines, herbs, non-prescription drugs, or dietary supplements you use. Also tell them if you smoke, drink alcohol, or use illegal drugs. Some items may interact with your medicine.  What should I watch for while using this medicine?  Tell your doctor or health care professional if your pain does not go away, if it gets worse, or if you have new or a different type of pain. You may develop tolerance to the medicine. Tolerance means that you will need a higher dose of the medicine for pain relief. Tolerance is normal and is expected if you take this medicine for a long time.  Do not suddenly stop taking your medicine because you may develop a severe reaction. Your body becomes used to the medicine. This does NOT mean you are addicted. Addiction is a behavior related to getting and using a drug for a non-medical reason. If you have pain, you have a medical reason to take pain medicine. Your doctor will tell you how much medicine to take. If your doctor wants you to stop the medicine, the dose will be slowly lowered over time to avoid any side effects.  There are different types of narcotic medicines (opiates). If you take more than one type at the same time or if you are taking another medicine that also causes drowsiness, you may have more side effects. Give your health care provider a list of all medicines you use. Your doctor will tell you how much medicine to take. Do not take more medicine than directed. Call emergency for help if you have problems breathing or unusual sleepiness.  You may get drowsy or dizzy. Do not drive, use machinery, or do anything that needs mental alertness until you know how the medicine affects you. Do not stand or sit up quickly, especially if you are an older patient. This reduces the risk of dizzy or fainting spells. Alcohol may interfere with the effect of this medicine. Avoid alcoholic drinks.  This medicine will cause constipation.  Try to have a bowel movement at least every 2 to 3 days. If you do not have a bowel movement for 3 days, call your doctor or health care professional.  Your mouth may get dry. Chewing sugarless gum or sucking hard candy, and drinking plenty of water may help. Contact your doctor if the problem does not go away or is severe.  What side effects may I notice from receiving this medicine?  Side effects that you should report to your doctor or health care professional as soon as possible:  -allergic reactions like skin rash, itching or hives, swelling of the face, lips, or tongue  -breathing problems  -confusion  -signs and symptoms of low blood pressure like dizziness; feeling faint or lightheaded, falls; unusually weak or tired  -trouble passing urine or change in the amount of urine  -trouble swallowing  Side effects that usually do not require medical attention (report to your doctor or health care professional if they continue or are bothersome):  -constipation  -dry mouth  -nausea, vomiting  -tiredness  This list may not describe all possible side effects. Call your doctor for medical advice about side effects. You may report side effects to FDA at 8-393-FDA-1260.  Where should I keep my medicine?  Keep out of the reach of children. This medicine can be abused. Keep your medicine in a safe place to protect it from theft. Do not share this medicine with anyone. Selling or giving away this medicine is dangerous and against the law.  Store at room temperature between 15 and 30 degrees C (59 and 86 degrees F). Protect from light. Keep container tightly closed.  This medicine may cause accidental overdose and death if it is taken by other adults, children, or pets. Flush any unused medicine down the toilet to reduce the chance of harm. Do not use the medicine after the expiration date.  NOTE: This sheet is a summary. It may not cover all possible information. If you have questions about this medicine, talk to your  doctor, pharmacist, or health care provider.  © 2018 Elsevier/Gold Standard (2016-11-01 16:55:57)    Incision Care, Adult  An incision is a surgical cut that is made through your skin. Most incisions are closed after surgery. Your incision may be closed with stitches (sutures), staples, skin glue, or adhesive strips. You may need to return to your health care provider to have sutures or staples removed. This may occur several days to several weeks after your surgery. The incision needs to be cared for properly to prevent infection.  How to care for your incision  Incision care   · Follow instructions from your health care provider about how to take care of your incision. Make sure you:  ¨ Wash your hands with soap and water before you change the bandage (dressing). If soap and water are not available, use hand .  ¨ Change your dressing as told by your health care provider.  ¨ Leave sutures, skin glue, or adhesive strips in place. These skin closures may need to stay in place for 2 weeks or longer. If adhesive strip edges start to loosen and curl up, you may trim the loose edges. Do not remove adhesive strips completely unless your health care provider tells you to do that.  · Check your incision area every day for signs of infection. Check for:  ¨ More redness, swelling, or pain.  ¨ More fluid or blood.  ¨ Warmth.  ¨ Pus or a bad smell.  · Ask your health care provider how to clean the incision. This may include:  ¨ Using mild soap and water.  ¨ Using a clean towel to pat the incision dry after cleaning it.  ¨ Applying a cream or ointment. Do this only as told by your health care provider.  ¨ Covering the incision with a clean dressing.  · Ask your health care provider when you can leave the incision uncovered.  · Do not take baths, swim, or use a hot tub until your health care provider approves. Ask your health care provider if you can take showers. You may only be allowed to take sponge baths for  bathing.  Medicines  · If you were prescribed an antibiotic medicine, cream, or ointment, take or apply the antibiotic as told by your health care provider. Do not stop taking or applying the antibiotic even if your condition improves.  · Take over-the-counter and prescription medicines only as told by your health care provider.  General instructions  · Limit movement around your incision to improve healing.  ¨ Avoid straining, lifting, or exercise for the first month, or for as long as told by your health care provider.  ¨ Follow instructions from your health care provider about returning to your normal activities.  ¨ Ask your health care provider what activities are safe.  · Protect your incision from the sun when you are outside for the first 6 months, or for as long as told by your health care provider. Apply sunscreen around the scar or cover it up.  · Keep all follow-up visits as told by your health care provider. This is important.  Contact a health care provider if:  · Your have more redness, swelling, or pain around the incision.  · You have more fluid or blood coming from the incision.  · Your incision feels warm to the touch.  · You have pus or a bad smell coming from the incision.  · You have a fever or shaking chills.  · You are nauseous or you vomit.  · You are dizzy.  · Your sutures or staples come undone.  Get help right away if:  · You have a red streak coming from your incision.  · Your incision bleeds through the dressing and the bleeding does not stop with gentle pressure.  · The edges of your incision open up and separate.  · You have severe pain.  · You have a rash.  · You are confused.  · You faint.  · You have trouble breathing and a fast heartbeat.  This information is not intended to replace advice given to you by your health care provider. Make sure you discuss any questions you have with your health care provider.  Document Released: 07/07/2006 Document Revised: 08/25/2017 Document  Reviewed: 07/05/2017  [employer] Interactive Patient Education © 2017 [employer]

## 2018-10-15 NOTE — DISCHARGE SUMMARY
Hospital Medicine Discharge Note     Admit Date:  10/9/2018       Discharge Date:   10/15/2018    Attending Physician:  Manolo Stallworth M.D.      Diagnoses (includes active and resolved):     Principal Problem:    Femur fracture (HCC) POA: Yes  Active Problems:    Anemia POA: Yes    Elevated troponin POA: Yes    HTN POA: Yes    Hypokalemia POA: Yes  Resolved Problems:    Delirium POA: No    Rhabdomyolysis POA: Yes    Abnormal LFTs POA: Unknown    Elevated BUN POA: Yes      Hospital Summary (Brief Narrative):         83 y.o. female who presented 10/9/2018 with right hip pain secondary to fall.  Dx with R intertrochanteric femur fracture.  Patient underwent R hip nail on 10-10-8.  Post op she had delirium that resolved. Troponin indeterminant range and without chest pain. Echo revealed preserved LV function. CPK was in the 700s.  She was treated with IVFs for mild rhabdomyolysis .   Cr stable at .76.  She had anemia , acute on chronic post op and was transfused prbc . Hgb has been stable 8.4-9 ranges prior to discharge. No symptoms of bleeding. Stool obt negative . She has signs of iron deficiency and will be given supplements. Recommend outpatient workup for chronic anemia.   She has been afebrile, no acute symptoms of infection despite mildly elevated Wbc.  On re evaluation, patient clinically stable. She has persistent  debility , plan for discharge to SNF for ongoing rehab.      Consultants:        Ricardo Alford, orthopedics.    Imaging/ Testing:      EC-ECHOCARDIOGRAM COMPLETE W/O CONT   Final Result   Normal left ventricular size, thickness and systolic function.  Mildly dilated left atrium.  Trace aortic insufficiency.  Right ventricular systolic pressure is estimated to be 30 mmHg.  Compared to the images of the prior study done 12/29/2014, aortic   insufficiency is slightly better, and estimated right-sided pressures   are lower.   DX-PORTABLE FLUORO > 1 HOUR   Final Result         Portable fluoroscopy  utilized for 1 minute 12 seconds.         DX-HIP-UNILATERAL-W/O PELVIS-2/3 VIEWS RIGHT   Final Result      Intraoperative fluoroscopy spot images as described above.      US-EXTREMITY VENOUS LOWER UNILAT RIGHT   Final Result      DX-FEMUR-2+ RIGHT   Final Result         1.  Intertrochanteric femoral neck fracture.   2.  Atherosclerosis      DX-CHEST-PORTABLE (1 VIEW)   Final Result         1.  No acute cardiopulmonary disease.   2.  Atherosclerosis   3.  Hyperexpansion of lungs favors changes of COPD.      DX-PELVIS-1 OR 2 VIEWS   Final Result         1.  Intertrochanteric right femoral neck fracture.   2.  Atherosclerosis            Procedures:          DATE OF OPERATION: 10/10/2018     PREOPERATIVE DIAGNOSIS: right intertrochanteric femur fracture     POSTOPERATIVE DIAGNOSIS: Same     PROCEDURE PERFORMED: Surgical treatment of right intertrochanteric femur fracture with intramedullary device     SURGEON: Ricardo Luna M.D.      ASSISTANT:  Layton Calderon DO     ANESTHESIOLOGIST: Nichelle Ramirez MD.     ANESTHESIA: General    Discharge Medications:             Medication List      START taking these medications      Instructions   ascorbic acid 500 MG tablet  Commonly known as:  VITAMIN C   Take 1 Tab by mouth 2 times a day.  Dose:  500 mg     enoxaparin 40 MG/0.4ML Soln inj  Commonly known as:  LOVENOX   Inject 40 mg as instructed every day.  Dose:  40 mg     ferrous sulfate 325 (65 Fe) MG tablet   Take 1 Tab by mouth every day.  Dose:  325 mg     omeprazole 20 MG delayed-release capsule  Commonly known as:  PRILOSEC   Take 1 Cap by mouth every day.  Dose:  20 mg     oxyCODONE immediate-release 5 MG Tabs  Commonly known as:  ROXICODONE   Take 1 Tab by mouth every 6 hours as needed for Severe Pain for up to 2 days. Standard Dose: Use caution when administering with other sedatives (narcotics, muscle relaxants, anxiolytics, and hypnotics)  Dose:  5 mg     potassium chloride SA 20 MEQ Tbcr  Commonly known as:   Kdur   Take 1 Tab by mouth every day for 5 days.  Dose:  20 mEq     senna-docusate 8.6-50 MG Tabs  Commonly known as:  PERICOLACE or SENOKOT S   Take 2 Tabs by mouth 2 Times a Day.  Dose:  2 Tab        CONTINUE taking these medications      Instructions   amLODIPine 10 MG Tabs  Commonly known as:  NORVASC   Take 10 mg by mouth every day. Indications: High Blood Pressure  Dose:  10 mg     citalopram 20 MG Tabs  Commonly known as:  CELEXA   Take 20 mg by mouth every day.  Dose:  20 mg     rosuvastatin 40 MG tablet  Commonly known as:  CRESTOR   Take 40 mg by mouth every bedtime. Indications: High Amount of Fats in the Blood  Dose:  40 mg        STOP taking these medications    aspirin EC 81 MG Tbec  Commonly known as:  ECOTRIN               Diet:       DIET ORDERS (Through next 24h)    Start     Ordered    10/10/18 1535  Diet Order Regular  ALL MEALS     Question:  Diet:  Answer:  Regular    10/10/18 1534            Activity:   As tolerated and directed by skilled nursing.      Code status:   Full code    Primary Care Provider:    Duane Patel D.O.    Follow up appointment details :      .  [facility] ([facility])  [address] [address]  [phone]        Ricardo Luna M.D.  [address] [facility] [address]  [phone]    In 1 week              Time spent on discharge day patient visit: 40 minutes    #################################################

## 2018-10-15 NOTE — CARE PLAN
Problem: Venous Thromboembolism (VTW)/Deep Vein Thrombosis (DVT) Prevention:  Goal: Patient will participate in Venous Thrombosis (VTE)/Deep Vein Thrombosis (DVT)Prevention Measures    Intervention: Ensure patient wears graduated elastic stockings (KATE hose) and/or SCDs, if ordered, when in bed or chair (Remove at least once per shift for skin check)  SCD applied to pt calves.  Pt educated on the importance of wearing them and what they are used for      Problem: Mobility  Goal: Risk for activity intolerance will decrease    Intervention: Encourage patient to increase activity level in collaboration with Interdisciplinary Team  Pt is a pivot to the commode with a FWW and 1 person assist.  Pt encouraged to use the commode each time and also encouraged pt that in the morning she will get in the chair for breakfast

## 2018-10-15 NOTE — PROGRESS NOTES
S:  Seen and examined.  POD #5 s/p R hip nail.  Doing well this morning.  No new complaints, pain controlled.  Mental status appears to have improved.    O: Blood pressure 104/57, pulse (!) 56, temperature 37 °C (98.6 °F), resp. rate 16, height 1.524 m (5'), weight 52.1 kg (114 lb 13.8 oz), SpO2 96 %, not currently breastfeeding..      Intake/Output Summary (Last 24 hours) at 10/15/18 0631  Last data filed at 10/14/18 1400   Gross per 24 hour   Intake              240 ml   Output                0 ml   Net              240 ml   .    Operative/injured extremity examined.  Compartments soft, distal light touch sensation intact, firing EHL/TA/GS/P.  Toes warm, well-perfused.  Wound c/d/i    Recent Labs      10/13/18   0420  10/15/18   0443   WBC  12.1*  12.4*   RBC  3.56*  3.86*   HEMOGLOBIN  8.4*  9.0*   HEMATOCRIT  27.1*  29.7*   MCV  76.1*  76.9*   MCH  23.6*  23.3*   MCHC  31.0*  30.3*   RDW  56.3*  61.9*   PLATELETCT  322  320   MPV  9.4  9.3       A/P:    POD #5 s/p R hip nail    Antibiotics: None required  Activity: WBAT operative extremity.  PT today.  Diet: General  DVT: Mechanical (SCDs) + Pharmacologic (Lovenox)  Dispo: D/C planning

## 2018-10-15 NOTE — PROGRESS NOTES
Pt's right hip dressing changed prior to D/C.  Denies pain medication. VSS.  Transport staff here to take pt. To [facility] nursing.

## 2018-10-15 NOTE — DISCHARGE PLANNING
Received Transport Form @ 1736  Spoke to Amber @ [facility]    Transport is scheduled for [facility] @1500 going to [facility].

## 2018-10-15 NOTE — CARE PLAN
Problem: Venous Thromboembolism (VTW)/Deep Vein Thrombosis (DVT) Prevention:  Goal: Patient will participate in Venous Thrombosis (VTE)/Deep Vein Thrombosis (DVT)Prevention Measures    Intervention: Encourage patient to perform ankle flex, foot rotation, and knee flex exercises in addition to other prophylatic measures every hour while awake  SCD's and Lovenox.

## 2018-10-15 NOTE — PROGRESS NOTES
Report given to Mayra WYNNE at [facility] in [place], [place].  Pt. Planned to discharge at 1500. Daughter with patient.

## 2018-10-20 LAB — EKG IMPRESSION: NORMAL

## 2018-11-20 ENCOUNTER — HOSPITAL ENCOUNTER (OUTPATIENT)
Dept: LAB | Facility: MEDICAL CENTER | Age: 83
End: 2018-11-20
Attending: FAMILY MEDICINE
Payer: MEDICARE

## 2018-11-20 LAB
ALBUMIN SERPL BCP-MCNC: 3.9 G/DL (ref 3.2–4.9)
ALBUMIN/GLOB SERPL: 1.3 G/DL
ALP SERPL-CCNC: 149 U/L (ref 30–99)
ALT SERPL-CCNC: 13 U/L (ref 2–50)
ANION GAP SERPL CALC-SCNC: 12 MMOL/L (ref 0–11.9)
ANISOCYTOSIS BLD QL SMEAR: ABNORMAL
AST SERPL-CCNC: 22 U/L (ref 12–45)
BASOPHILS # BLD AUTO: 1.4 % (ref 0–1.8)
BASOPHILS # BLD: 0.13 K/UL (ref 0–0.12)
BILIRUB SERPL-MCNC: 0.4 MG/DL (ref 0.1–1.5)
BUN SERPL-MCNC: 10 MG/DL (ref 8–22)
BURR CELLS BLD QL SMEAR: NORMAL
CALCIUM SERPL-MCNC: 9.3 MG/DL (ref 8.5–10.5)
CHLORIDE SERPL-SCNC: 104 MMOL/L (ref 96–112)
CHOLEST SERPL-MCNC: 159 MG/DL (ref 100–199)
CO2 SERPL-SCNC: 23 MMOL/L (ref 20–33)
COMMENT 1642: NORMAL
CREAT SERPL-MCNC: 0.94 MG/DL (ref 0.5–1.4)
EOSINOPHIL # BLD AUTO: 0.14 K/UL (ref 0–0.51)
EOSINOPHIL NFR BLD: 1.5 % (ref 0–6.9)
ERYTHROCYTE [DISTWIDTH] IN BLOOD BY AUTOMATED COUNT: 72.3 FL (ref 35.9–50)
FASTING STATUS PATIENT QL REPORTED: NORMAL
FERRITIN SERPL-MCNC: 34.7 NG/ML (ref 10–291)
GLOBULIN SER CALC-MCNC: 3 G/DL (ref 1.9–3.5)
GLUCOSE SERPL-MCNC: 106 MG/DL (ref 65–99)
HCT VFR BLD AUTO: 40.6 % (ref 37–47)
HDLC SERPL-MCNC: 99 MG/DL
HGB BLD-MCNC: 12.4 G/DL (ref 12–16)
IMM GRANULOCYTES # BLD AUTO: 0.02 K/UL (ref 0–0.11)
IMM GRANULOCYTES NFR BLD AUTO: 0.2 % (ref 0–0.9)
IRON SERPL-MCNC: 30 UG/DL (ref 40–170)
LDLC SERPL CALC-MCNC: 44 MG/DL
LG PLATELETS BLD QL SMEAR: NORMAL
LYMPHOCYTES # BLD AUTO: 1.81 K/UL (ref 1–4.8)
LYMPHOCYTES NFR BLD: 19.8 % (ref 22–41)
MCH RBC QN AUTO: 25.3 PG (ref 27–33)
MCHC RBC AUTO-ENTMCNC: 30.5 G/DL (ref 33.6–35)
MCV RBC AUTO: 82.9 FL (ref 81.4–97.8)
MICROCYTES BLD QL SMEAR: ABNORMAL
MONOCYTES # BLD AUTO: 0.7 K/UL (ref 0–0.85)
MONOCYTES NFR BLD AUTO: 7.7 % (ref 0–13.4)
MORPHOLOGY BLD-IMP: NORMAL
NEUTROPHILS # BLD AUTO: 6.34 K/UL (ref 2–7.15)
NEUTROPHILS NFR BLD: 69.4 % (ref 44–72)
NRBC # BLD AUTO: 0 K/UL
NRBC BLD-RTO: 0 /100 WBC
OVALOCYTES BLD QL SMEAR: NORMAL
PLATELET # BLD AUTO: 370 K/UL (ref 164–446)
PLATELET BLD QL SMEAR: NORMAL
PMV BLD AUTO: 10.6 FL (ref 9–12.9)
POIKILOCYTOSIS BLD QL SMEAR: NORMAL
POTASSIUM SERPL-SCNC: 3.1 MMOL/L (ref 3.6–5.5)
PROT SERPL-MCNC: 6.9 G/DL (ref 6–8.2)
RBC # BLD AUTO: 4.9 M/UL (ref 4.2–5.4)
RBC BLD AUTO: PRESENT
SODIUM SERPL-SCNC: 139 MMOL/L (ref 135–145)
TRIGL SERPL-MCNC: 82 MG/DL (ref 0–149)
WBC # BLD AUTO: 9.1 K/UL (ref 4.8–10.8)

## 2018-11-20 PROCEDURE — 36415 COLL VENOUS BLD VENIPUNCTURE: CPT

## 2018-11-20 PROCEDURE — 80053 COMPREHEN METABOLIC PANEL: CPT

## 2018-11-20 PROCEDURE — 82728 ASSAY OF FERRITIN: CPT

## 2018-11-20 PROCEDURE — 85025 COMPLETE CBC W/AUTO DIFF WBC: CPT

## 2018-11-20 PROCEDURE — 80061 LIPID PANEL: CPT

## 2018-11-20 PROCEDURE — 83540 ASSAY OF IRON: CPT

## 2018-12-18 ENCOUNTER — HOSPITAL ENCOUNTER (OUTPATIENT)
Dept: LAB | Facility: MEDICAL CENTER | Age: 83
End: 2018-12-18
Attending: FAMILY MEDICINE
Payer: MEDICARE

## 2018-12-18 LAB
ALBUMIN SERPL BCP-MCNC: 4.1 G/DL (ref 3.2–4.9)
ANISOCYTOSIS BLD QL SMEAR: ABNORMAL
BASOPHILS # BLD AUTO: 1.6 % (ref 0–1.8)
BASOPHILS # BLD: 0.15 K/UL (ref 0–0.12)
BUN SERPL-MCNC: 13 MG/DL (ref 8–22)
BURR CELLS BLD QL SMEAR: NORMAL
CALCIUM SERPL-MCNC: 9.8 MG/DL (ref 8.5–10.5)
CHLORIDE SERPL-SCNC: 106 MMOL/L (ref 96–112)
CO2 SERPL-SCNC: 23 MMOL/L (ref 20–33)
COMMENT 1642: NORMAL
CREAT SERPL-MCNC: 0.91 MG/DL (ref 0.5–1.4)
EOSINOPHIL # BLD AUTO: 0.14 K/UL (ref 0–0.51)
EOSINOPHIL NFR BLD: 1.5 % (ref 0–6.9)
ERYTHROCYTE [DISTWIDTH] IN BLOOD BY AUTOMATED COUNT: 71.7 FL (ref 35.9–50)
GLUCOSE SERPL-MCNC: 101 MG/DL (ref 65–99)
HCT VFR BLD AUTO: 40.6 % (ref 37–47)
HGB BLD-MCNC: 12.2 G/DL (ref 12–16)
HYPOCHROMIA BLD QL SMEAR: ABNORMAL
IMM GRANULOCYTES # BLD AUTO: 0.03 K/UL (ref 0–0.11)
IMM GRANULOCYTES NFR BLD AUTO: 0.3 % (ref 0–0.9)
LG PLATELETS BLD QL SMEAR: NORMAL
LYMPHOCYTES # BLD AUTO: 2.17 K/UL (ref 1–4.8)
LYMPHOCYTES NFR BLD: 22.7 % (ref 22–41)
MCH RBC QN AUTO: 25.5 PG (ref 27–33)
MCHC RBC AUTO-ENTMCNC: 30 G/DL (ref 33.6–35)
MCV RBC AUTO: 84.8 FL (ref 81.4–97.8)
MICROCYTES BLD QL SMEAR: ABNORMAL
MONOCYTES # BLD AUTO: 0.72 K/UL (ref 0–0.85)
MONOCYTES NFR BLD AUTO: 7.5 % (ref 0–13.4)
MORPHOLOGY BLD-IMP: NORMAL
NEUTROPHILS # BLD AUTO: 6.37 K/UL (ref 2–7.15)
NEUTROPHILS NFR BLD: 66.4 % (ref 44–72)
NRBC # BLD AUTO: 0 K/UL
NRBC BLD-RTO: 0 /100 WBC
PHOSPHATE SERPL-MCNC: 3.5 MG/DL (ref 2.5–4.5)
PLATELET # BLD AUTO: 371 K/UL (ref 164–446)
PLATELET BLD QL SMEAR: NORMAL
PMV BLD AUTO: 10.3 FL (ref 9–12.9)
POIKILOCYTOSIS BLD QL SMEAR: NORMAL
POTASSIUM SERPL-SCNC: 3.8 MMOL/L (ref 3.6–5.5)
RBC # BLD AUTO: 4.79 M/UL (ref 4.2–5.4)
RBC BLD AUTO: PRESENT
SODIUM SERPL-SCNC: 137 MMOL/L (ref 135–145)
WBC # BLD AUTO: 9.6 K/UL (ref 4.8–10.8)

## 2018-12-18 PROCEDURE — 80069 RENAL FUNCTION PANEL: CPT

## 2018-12-18 PROCEDURE — 36415 COLL VENOUS BLD VENIPUNCTURE: CPT

## 2018-12-18 PROCEDURE — 85025 COMPLETE CBC W/AUTO DIFF WBC: CPT

## 2019-05-07 ENCOUNTER — APPOINTMENT (OUTPATIENT)
Dept: RADIOLOGY | Facility: MEDICAL CENTER | Age: 84
DRG: 811 | End: 2019-05-07
Attending: EMERGENCY MEDICINE
Payer: MEDICARE

## 2019-05-07 ENCOUNTER — HOSPITAL ENCOUNTER (INPATIENT)
Facility: MEDICAL CENTER | Age: 84
LOS: 7 days | DRG: 811 | End: 2019-05-14
Attending: EMERGENCY MEDICINE | Admitting: HOSPITALIST
Payer: MEDICARE

## 2019-05-07 DIAGNOSIS — D62 ANEMIA DUE TO ACUTE BLOOD LOSS: ICD-10-CM

## 2019-05-07 DIAGNOSIS — K92.2 GASTROINTESTINAL HEMORRHAGE, UNSPECIFIED GASTROINTESTINAL HEMORRHAGE TYPE: ICD-10-CM

## 2019-05-07 PROBLEM — D64.9 SEVERE ANEMIA: Status: ACTIVE | Noted: 2019-05-07

## 2019-05-07 PROBLEM — R19.5 OCCULT BLOOD POSITIVE STOOL: Status: ACTIVE | Noted: 2019-05-07

## 2019-05-07 PROBLEM — J96.01 ACUTE RESPIRATORY FAILURE WITH HYPOXIA (HCC): Status: ACTIVE | Noted: 2019-05-07

## 2019-05-07 PROBLEM — R06.09 DOE (DYSPNEA ON EXERTION): Status: ACTIVE | Noted: 2019-05-07

## 2019-05-07 LAB
ABO + RH BLD: NORMAL
ABO GROUP BLD: NORMAL
ALBUMIN SERPL BCP-MCNC: 3.6 G/DL (ref 3.2–4.9)
ALBUMIN/GLOB SERPL: 1.6 G/DL
ALP SERPL-CCNC: 70 U/L (ref 30–99)
ALT SERPL-CCNC: 22 U/L (ref 2–50)
ANION GAP SERPL CALC-SCNC: 14 MMOL/L (ref 0–11.9)
ANISOCYTOSIS BLD QL SMEAR: ABNORMAL
AST SERPL-CCNC: 36 U/L (ref 12–45)
BARCODED ABORH UBTYP: 6200
BARCODED ABORH UBTYP: 6200
BARCODED PRD CODE UBPRD: NORMAL
BARCODED PRD CODE UBPRD: NORMAL
BARCODED UNIT NUM UBUNT: NORMAL
BARCODED UNIT NUM UBUNT: NORMAL
BASOPHILS # BLD AUTO: 0.3 % (ref 0–1.8)
BASOPHILS # BLD: 0.04 K/UL (ref 0–0.12)
BILIRUB SERPL-MCNC: 0.6 MG/DL (ref 0.1–1.5)
BLD GP AB SCN SERPL QL: NORMAL
BUN SERPL-MCNC: 28 MG/DL (ref 8–22)
CALCIUM SERPL-MCNC: 8.4 MG/DL (ref 8.5–10.5)
CHLORIDE SERPL-SCNC: 112 MMOL/L (ref 96–112)
CO2 SERPL-SCNC: 16 MMOL/L (ref 20–33)
COMMENT 1642: NORMAL
COMPONENT R 8504R: NORMAL
COMPONENT R 8504R: NORMAL
CREAT SERPL-MCNC: 1.16 MG/DL (ref 0.5–1.4)
EKG IMPRESSION: NORMAL
EOSINOPHIL # BLD AUTO: 0 K/UL (ref 0–0.51)
EOSINOPHIL NFR BLD: 0 % (ref 0–6.9)
ERYTHROCYTE [DISTWIDTH] IN BLOOD BY AUTOMATED COUNT: 49.6 FL (ref 35.9–50)
FERRITIN SERPL-MCNC: 5 NG/ML (ref 10–291)
GLOBULIN SER CALC-MCNC: 2.2 G/DL (ref 1.9–3.5)
GLUCOSE SERPL-MCNC: 131 MG/DL (ref 65–99)
HCT VFR BLD AUTO: 13.6 % (ref 37–47)
HCT VFR BLD AUTO: 18.3 % (ref 37–47)
HCT VFR BLD AUTO: 24.1 % (ref 37–47)
HGB BLD-MCNC: 3.8 G/DL (ref 12–16)
HGB BLD-MCNC: 5.4 G/DL (ref 12–16)
HGB BLD-MCNC: 7.4 G/DL (ref 12–16)
HYPOCHROMIA BLD QL SMEAR: ABNORMAL
IMM GRANULOCYTES # BLD AUTO: 0.12 K/UL (ref 0–0.11)
IMM GRANULOCYTES NFR BLD AUTO: 0.8 % (ref 0–0.9)
INR PPP: 1.27 (ref 0.87–1.13)
IRON SATN MFR SERPL: ABNORMAL % (ref 15–55)
IRON SERPL-MCNC: <10 UG/DL (ref 40–170)
LYMPHOCYTES # BLD AUTO: 1.14 K/UL (ref 1–4.8)
LYMPHOCYTES NFR BLD: 8 % (ref 22–41)
MCH RBC QN AUTO: 20.3 PG (ref 27–33)
MCHC RBC AUTO-ENTMCNC: 27.4 G/DL (ref 33.6–35)
MCV RBC AUTO: 74.2 FL (ref 81.4–97.8)
MICROCYTES BLD QL SMEAR: ABNORMAL
MONOCYTES # BLD AUTO: 0.87 K/UL (ref 0–0.85)
MONOCYTES NFR BLD AUTO: 6.1 % (ref 0–13.4)
MORPHOLOGY BLD-IMP: NORMAL
NEUTROPHILS # BLD AUTO: 12.02 K/UL (ref 2–7.15)
NEUTROPHILS NFR BLD: 84.8 % (ref 44–72)
NRBC # BLD AUTO: 0.1 K/UL
NRBC BLD-RTO: 0.7 /100 WBC
OVALOCYTES BLD QL SMEAR: NORMAL
PLATELET # BLD AUTO: 622 K/UL (ref 164–446)
PLATELET BLD QL SMEAR: NORMAL
PMV BLD AUTO: 9.6 FL (ref 9–12.9)
POIKILOCYTOSIS BLD QL SMEAR: NORMAL
POTASSIUM SERPL-SCNC: 3.8 MMOL/L (ref 3.6–5.5)
PROCALCITONIN SERPL-MCNC: 0.06 NG/ML
PRODUCT TYPE UPROD: NORMAL
PRODUCT TYPE UPROD: NORMAL
PROT SERPL-MCNC: 5.8 G/DL (ref 6–8.2)
PROTHROMBIN TIME: 16 SEC (ref 12–14.6)
RBC # BLD AUTO: 1.82 M/UL (ref 4.2–5.4)
RBC BLD AUTO: PRESENT
RH BLD: NORMAL
SCHISTOCYTES BLD QL SMEAR: NORMAL
SODIUM SERPL-SCNC: 142 MMOL/L (ref 135–145)
TARGETS BLD QL SMEAR: NORMAL
TIBC SERPL-MCNC: 428 UG/DL (ref 250–450)
TROPONIN I SERPL-MCNC: 0.13 NG/ML (ref 0–0.04)
TROPONIN I SERPL-MCNC: 0.47 NG/ML (ref 0–0.04)
UNIT STATUS USTAT: NORMAL
UNIT STATUS USTAT: NORMAL
WBC # BLD AUTO: 14.2 K/UL (ref 4.8–10.8)

## 2019-05-07 PROCEDURE — 36430 TRANSFUSION BLD/BLD COMPNT: CPT

## 2019-05-07 PROCEDURE — 80053 COMPREHEN METABOLIC PANEL: CPT

## 2019-05-07 PROCEDURE — 84484 ASSAY OF TROPONIN QUANT: CPT

## 2019-05-07 PROCEDURE — 93005 ELECTROCARDIOGRAM TRACING: CPT | Performed by: EMERGENCY MEDICINE

## 2019-05-07 PROCEDURE — 700105 HCHG RX REV CODE 258: Performed by: EMERGENCY MEDICINE

## 2019-05-07 PROCEDURE — 36415 COLL VENOUS BLD VENIPUNCTURE: CPT

## 2019-05-07 PROCEDURE — 85025 COMPLETE CBC W/AUTO DIFF WBC: CPT

## 2019-05-07 PROCEDURE — 85610 PROTHROMBIN TIME: CPT

## 2019-05-07 PROCEDURE — 99285 EMERGENCY DEPT VISIT HI MDM: CPT

## 2019-05-07 PROCEDURE — 93005 ELECTROCARDIOGRAM TRACING: CPT

## 2019-05-07 PROCEDURE — A9270 NON-COVERED ITEM OR SERVICE: HCPCS | Performed by: HOSPITALIST

## 2019-05-07 PROCEDURE — 83540 ASSAY OF IRON: CPT

## 2019-05-07 PROCEDURE — 700105 HCHG RX REV CODE 258: Performed by: HOSPITALIST

## 2019-05-07 PROCEDURE — 82728 ASSAY OF FERRITIN: CPT

## 2019-05-07 PROCEDURE — 86923 COMPATIBILITY TEST ELECTRIC: CPT

## 2019-05-07 PROCEDURE — 700102 HCHG RX REV CODE 250 W/ 637 OVERRIDE(OP): Performed by: HOSPITALIST

## 2019-05-07 PROCEDURE — 83550 IRON BINDING TEST: CPT

## 2019-05-07 PROCEDURE — 99223 1ST HOSP IP/OBS HIGH 75: CPT | Mod: AI | Performed by: HOSPITALIST

## 2019-05-07 PROCEDURE — 86900 BLOOD TYPING SEROLOGIC ABO: CPT

## 2019-05-07 PROCEDURE — 86850 RBC ANTIBODY SCREEN: CPT

## 2019-05-07 PROCEDURE — 86901 BLOOD TYPING SEROLOGIC RH(D): CPT

## 2019-05-07 PROCEDURE — 85018 HEMOGLOBIN: CPT

## 2019-05-07 PROCEDURE — 85014 HEMATOCRIT: CPT

## 2019-05-07 PROCEDURE — 302135 SEQUENTIAL COMPRESSION MACHINE: Performed by: HOSPITALIST

## 2019-05-07 PROCEDURE — P9016 RBC LEUKOCYTES REDUCED: HCPCS | Mod: 91

## 2019-05-07 PROCEDURE — 700111 HCHG RX REV CODE 636 W/ 250 OVERRIDE (IP): Performed by: HOSPITALIST

## 2019-05-07 PROCEDURE — 770020 HCHG ROOM/CARE - TELE (206)

## 2019-05-07 PROCEDURE — 71045 X-RAY EXAM CHEST 1 VIEW: CPT

## 2019-05-07 PROCEDURE — 82272 OCCULT BLD FECES 1-3 TESTS: CPT

## 2019-05-07 PROCEDURE — 30233N1 TRANSFUSION OF NONAUTOLOGOUS RED BLOOD CELLS INTO PERIPHERAL VEIN, PERCUTANEOUS APPROACH: ICD-10-PCS | Performed by: INTERNAL MEDICINE

## 2019-05-07 PROCEDURE — 84145 PROCALCITONIN (PCT): CPT

## 2019-05-07 RX ORDER — ACETAMINOPHEN 325 MG/1
650 TABLET ORAL EVERY 6 HOURS PRN
Status: DISCONTINUED | OUTPATIENT
Start: 2019-05-07 | End: 2019-05-14 | Stop reason: HOSPADM

## 2019-05-07 RX ORDER — PANTOPRAZOLE SODIUM 40 MG/10ML
40 INJECTION, POWDER, LYOPHILIZED, FOR SOLUTION INTRAVENOUS 2 TIMES DAILY
Status: DISCONTINUED | OUTPATIENT
Start: 2019-05-07 | End: 2019-05-07

## 2019-05-07 RX ORDER — CITALOPRAM 40 MG/1
40 TABLET ORAL DAILY
Status: DISCONTINUED | OUTPATIENT
Start: 2019-05-07 | End: 2019-05-14 | Stop reason: HOSPADM

## 2019-05-07 RX ORDER — FUROSEMIDE 10 MG/ML
10 INJECTION INTRAMUSCULAR; INTRAVENOUS ONCE
Status: COMPLETED | OUTPATIENT
Start: 2019-05-07 | End: 2019-05-07

## 2019-05-07 RX ORDER — SODIUM CHLORIDE 9 MG/ML
INJECTION, SOLUTION INTRAVENOUS ONCE
Status: COMPLETED | OUTPATIENT
Start: 2019-05-07 | End: 2019-05-07

## 2019-05-07 RX ORDER — SODIUM CHLORIDE 9 MG/ML
INJECTION, SOLUTION INTRAVENOUS CONTINUOUS
Status: DISCONTINUED | OUTPATIENT
Start: 2019-05-07 | End: 2019-05-07

## 2019-05-07 RX ORDER — BISACODYL 10 MG
10 SUPPOSITORY, RECTAL RECTAL
Status: DISCONTINUED | OUTPATIENT
Start: 2019-05-07 | End: 2019-05-14 | Stop reason: HOSPADM

## 2019-05-07 RX ORDER — POLYETHYLENE GLYCOL 3350 17 G/17G
1 POWDER, FOR SOLUTION ORAL
Status: DISCONTINUED | OUTPATIENT
Start: 2019-05-07 | End: 2019-05-14 | Stop reason: HOSPADM

## 2019-05-07 RX ORDER — VITAMIN B COMPLEX
1 TABLET ORAL DAILY
COMMUNITY
End: 2019-09-10

## 2019-05-07 RX ORDER — OMEPRAZOLE 20 MG/1
20 CAPSULE, DELAYED RELEASE ORAL 2 TIMES DAILY
Status: DISCONTINUED | OUTPATIENT
Start: 2019-05-07 | End: 2019-05-14 | Stop reason: HOSPADM

## 2019-05-07 RX ORDER — CITALOPRAM 40 MG/1
40 TABLET ORAL DAILY
COMMUNITY

## 2019-05-07 RX ORDER — AMOXICILLIN 250 MG
2 CAPSULE ORAL 2 TIMES DAILY
Status: DISCONTINUED | OUTPATIENT
Start: 2019-05-07 | End: 2019-05-14 | Stop reason: HOSPADM

## 2019-05-07 RX ADMIN — OMEPRAZOLE 20 MG: 20 CAPSULE, DELAYED RELEASE ORAL at 15:30

## 2019-05-07 RX ADMIN — SENNOSIDES, DOCUSATE SODIUM 2 TABLET: 50; 8.6 TABLET, FILM COATED ORAL at 18:05

## 2019-05-07 RX ADMIN — SODIUM CHLORIDE: 9 INJECTION, SOLUTION INTRAVENOUS at 15:00

## 2019-05-07 RX ADMIN — FUROSEMIDE 10 MG: 10 INJECTION, SOLUTION INTRAMUSCULAR; INTRAVENOUS at 18:49

## 2019-05-07 RX ADMIN — SODIUM CHLORIDE: 9 INJECTION, SOLUTION INTRAVENOUS at 12:47

## 2019-05-07 ASSESSMENT — ENCOUNTER SYMPTOMS
BLURRED VISION: 0
WEAKNESS: 1
PALPITATIONS: 0
HEADACHES: 0
DIZZINESS: 0
DEPRESSION: 1
TREMORS: 0
CHILLS: 0
PND: 0
MEMORY LOSS: 0
COUGH: 0
ORTHOPNEA: 0
MYALGIAS: 1
INSOMNIA: 0
NERVOUS/ANXIOUS: 1
EYE PAIN: 0
FEVER: 0
SHORTNESS OF BREATH: 1

## 2019-05-07 ASSESSMENT — COGNITIVE AND FUNCTIONAL STATUS - GENERAL
DAILY ACTIVITIY SCORE: 20
STANDING UP FROM CHAIR USING ARMS: A LITTLE
CLIMB 3 TO 5 STEPS WITH RAILING: A LITTLE
SUGGESTED CMS G CODE MODIFIER MOBILITY: CJ
HELP NEEDED FOR BATHING: A LITTLE
WALKING IN HOSPITAL ROOM: A LITTLE
SUGGESTED CMS G CODE MODIFIER DAILY ACTIVITY: CJ
MOBILITY SCORE: 21
DRESSING REGULAR UPPER BODY CLOTHING: A LITTLE
DRESSING REGULAR LOWER BODY CLOTHING: A LITTLE
TOILETING: A LITTLE

## 2019-05-07 ASSESSMENT — COPD QUESTIONNAIRES
HAVE YOU SMOKED AT LEAST 100 CIGARETTES IN YOUR ENTIRE LIFE: NO/DON'T KNOW
IN THE PAST 12 MONTHS DO YOU DO LESS THAN YOU USED TO BECAUSE OF YOUR BREATHING PROBLEMS: DISAGREE/UNSURE
DO YOU EVER COUGH UP ANY MUCUS OR PHLEGM?: NO/ONLY WITH OCCASIONAL COLDS OR INFECTIONS
DURING THE PAST 4 WEEKS HOW MUCH DID YOU FEEL SHORT OF BREATH: SOME OF THE TIME
COPD SCREENING SCORE: 3

## 2019-05-07 ASSESSMENT — LIFESTYLE VARIABLES
SUBSTANCE_ABUSE: 0
EVER_SMOKED: NEVER

## 2019-05-07 ASSESSMENT — PATIENT HEALTH QUESTIONNAIRE - PHQ9
SUM OF ALL RESPONSES TO PHQ9 QUESTIONS 1 AND 2: 0
2. FEELING DOWN, DEPRESSED, IRRITABLE, OR HOPELESS: NOT AT ALL
1. LITTLE INTEREST OR PLEASURE IN DOING THINGS: NOT AT ALL

## 2019-05-07 NOTE — ED NOTES
1120-Tech from Lab called with critical result of Hemoglobin 3.8 Hematocrit at 13.6. Critical lab result read back to Tech.   Dr. Orlando notified of critical lab result at 1121.  Critical lab result read back by Dr. Orlando.

## 2019-05-07 NOTE — ED NOTES
Blood transfusion completed. VSS. Will re-draw H&H before patient can transfer to Tele 7. Charge RN informed.

## 2019-05-07 NOTE — DISCHARGE PLANNING
Pt lives with daughter (Payton Naik [phone]) who assists with any needs she has. She states her daughter makes her decisions but there is no legal document.    Care Transition Team Assessment    Information Source  Orientation : Oriented x 4  Information Given By: Patient  Who is responsible for making decisions for patient? :  (pt states daughter but no legal document)         Elopement Risk  Legal Hold: No  Ambulatory or Self Mobile in Wheelchair: No-Not an Elopement Risk    Interdisciplinary Discharge Planning  Does Admitting Nurse Feel This Could be a Complex Discharge?: No  Primary Care Physician: Duane Patel  Lives with - Patient's Self Care Capacity: Adult Children  Support Systems: Children  Do You Take your Prescribed Medications Regularly: Yes  Able to Return to Previous ADL's: Yes  Mobility Issues: Yes  Prior Services: None  Patient Expects to be Discharged to:: home  Assistance Needed: Unknown at this Time  Durable Medical Equipment: Not Applicable    Discharge Preparedness  What is your plan after discharge?: Home with help  What are your discharge supports?: Child  Prior Functional Level: Needs Assist with Activities of Daily Living  Difficulity with ADLs: Walking  Difficulty with ADLs Comment: cane prn  Difficulity with IADLs: Cooking, Driving, Keeping track of finances, Laundry, Managing medication, Shopping  Difficulity with IADL Comments: daughter assists    Functional Assesment  Prior Functional Level: Needs Assist with Activities of Daily Living    Finances  Financial Barriers to Discharge: No  Prescription Coverage: Yes                             Discharge Risks or Barriers  Discharge risks or barriers?: No    Anticipated Discharge Information  Anticipated discharge disposition: Home  Discharge Address: facesheet verified

## 2019-05-07 NOTE — ASSESSMENT & PLAN NOTE
Controlled,however BP running low lately , likely 2/2 acute anemia  IV bolus give in ER, start IVF 100ml/hr  Hold BP meds  Monitor

## 2019-05-07 NOTE — ASSESSMENT & PLAN NOTE
No chest pain on admission  Had a Stress test done which was negative, cardiology has signed off.

## 2019-05-07 NOTE — ED NOTES
Called hematology for an estimated time for completion of H&H. H& H is resulted.  Hadley from Lab called with critical result of HGB of 5.4 and HCT of 18.3 at 1524. Critical lab result read back to Hadley.   Dr. Butler paged at this time.  Awaiting callback for critical results

## 2019-05-07 NOTE — ED PROVIDER NOTES
ED Provider Note    CHIEF COMPLAINT  Chief Complaint   Patient presents with   • Shortness of Breath   • Weakness     generalized   • Loss of Appetite       HPI  Rebecca Bishop is a 84 y.o. female who presents for evaluation of shortness of breath.  The patient is here with her adult daughter whom she lives with.  The daughter states that over the past 3 days she has not been eating well and has just had generalized weakness.  She is had no fevers.  She is had no vomiting.  This morning she awoke short of breath.  Daughter states that she does not have known respiratory problems.  She was found to have room air saturations of 80% upon paramedics arrival.  Here the patient is on a 7 L mask and is satting 95%.  She has had no chest pain.  She states she feels much better with the oxygen on.    REVIEW OF SYSTEMS  See HPI for further details. All other systems negative.    PAST MEDICAL HISTORY  Past Medical History:   Diagnosis Date   • Hypertension    • Stroke (HCC)    • TIA (transient ischemic attack) 1/2015       FAMILY HISTORY  Family History   Problem Relation Age of Onset   • Cancer Mother         breast cancer   • Cancer Sister         breast cancer       SOCIAL HISTORY  Social History     Social History   • Marital status:      Spouse name: N/A   • Number of children: N/A   • Years of education: N/A     Social History Main Topics   • Smoking status: Never Smoker   • Smokeless tobacco: Not on file   • Alcohol use Yes      Comment: SOCIAL   • Drug use: No   • Sexual activity: Not on file     Other Topics Concern   • Not on file     Social History Narrative   • No narrative on file       SURGICAL HISTORY  Past Surgical History:   Procedure Laterality Date   • HIP NAILING INTRAMEDULLARY Right 10/10/2018    Procedure: HIP NAILING INTRAMEDULLARY;  Surgeon: Ricardo Luna M.D.;  Location: SURGERY [facility];  Service: Orthopedics   • ANKLE ORIF Right 6/1/2017    Procedure: ANKLE ORIF;  Surgeon: Ricardo HAYES  THONY Luna.;  Location: SURGERY [facility];  Service:        CURRENT MEDICATIONS  Home Medications     Reviewed by Corry Mar (Pharmacy Tech) on 05/07/19 at 1016  Med List Status: Complete   Medication Last Dose Status   amlodipine (NORVASC) 10 MG Tab 5/6/2019 Active   B Complex Vitamins (VITAMIN B-COMPLEX) Tab 5/6/2019 Active   citalopram (CELEXA) 40 MG Tab 5/6/2019 Active                ALLERGIES  No Known Allergies    PHYSICAL EXAM  VITAL SIGNS: /71   Pulse 84   Temp 36.5 °C (97.7 °F) (Temporal)   Resp (!) 22   Wt 44 kg (97 lb)   SpO2 95%   BMI 18.94 kg/m²   Constitutional: Thin elderly female in no acute distress.    HENT: Normocephalic, Atraumatic.  Eyes:  EOMI, Conjunctiva normal, No discharge.   Cardiovascular: Normal heart rate, Normal rhythm, 3/6 systolic murmurs, No rubs, No gallops.   Thorax & Lungs: Clear to auscultation without wheezes, rales, or rhonchi. No chest tenderness.   Abdomen:  Soft, No tenderness, No masses, No pulsatile masses.   Rectal: Normal sphincter tone.  Dark brown heme positive stool.  Skin: Warm, Dry.  Extremities: No edema, no calf tenderness.  Musculoskeletal: Good range of motion in all major joints.    Neurologic: Awake and alert, No focal deficits noted.          RADIOLOGY/PROCEDURES  DX-CHEST-PORTABLE (1 VIEW)   Final Result      1.  Small to moderate left pleural effusion with associated left basilar atelectasis versus consolidation.   2.  Mild diffuse interstitial edema.   3.  Borderline cardiomegaly.            COURSE & MEDICAL DECISION MAKING  Pertinent Labs & Imaging studies reviewed. (See chart for details)  This is an 84-year-old here for evaluation of generalized weakness and shortness of breath.  She did have some hypoxemia which responds to oxygen therapy.  An IV is established and laboratories are obtained.  This includes a CBC with a white count of 14 and most significantly a hemoglobin of 3.8.  Platelet count is normal.  After  getting the results of the CBC I went in and performed a rectal exam on the patient.  This showed dark brown heme positive blood.  Patient is not hypotensive or tachycardic.  Laboratories include chemistries notable for glucose of 131.  BUN is elevated at 28 with a normal creatinine suggesting the possibility of an upper GI source.  Bicarb is somewhat low at 16.  INR is 1.27.  Troponin I has come back elevated at 0.13.  Chest x-ray shows a small left pleural effusion with likely some atelectasis.  I discussed the results of the studies with the patient and her daughter.  They understand that she will be emergently transfused and admitted to the hospital for further evaluation and treatment.  I have discussed the case with the hospitalist and she will be the primary admitting physician.  I have also discussed the case with Dr. Lopez of gastroenterology and he will consult on the case.    FINAL IMPRESSION  1.  GI bleed  2.  Critical anemia with a hemoglobin of 3.8  3.  Dyspnea  4.  Generalized weakness  5.  Prerenal azotemia  6.  Patient required 35 minutes of critical care time         Electronically signed by: Jose G Orlando, 5/7/2019 10:50 AM

## 2019-05-07 NOTE — ASSESSMENT & PLAN NOTE
No hx of Colonoscopy per patient  Has been having some black stool intermittently over the last 6 months  Monitor h/h t6ajoft  hgb 3 in ER, has received 2 units of RBC so far.   Pt has been cleared by cardiology, and GI did a EGD and colonoscopy on 05/12/19 which showed some Schatzki ring, multiple gastric erosions, and pandiverticulosis.  Hemoglobin last night 6.7 ?? Lab error received 1 unit of RBC and is now 9.1 will continue to monitor

## 2019-05-07 NOTE — ASSESSMENT & PLAN NOTE
Anemia versus edema versus pneumonia  improving now that anemia is better   cont on ceftriaxone and azithromycin, de-escalate if procalcitonin is negative  Supplemental oxygen continued, wean as tolerated

## 2019-05-07 NOTE — ASSESSMENT & PLAN NOTE
Patient coming in with a hemoglobin of 3.8 and dyspnea on exertion and generalized weakness.  Acute severe anemia likely secondary to GI bleed black stools for several months intermittently.  She has never had a colonoscopy for Hemoccult is positive here in the ED.    GI did a EGD and colonoscopy on 05/12/19 which showed some Schatzki ring, multiple gastric erosions, and pandiverticulosis.  Her hemoglobin dropped last night to 6.7 ?? Lab error, received 1 unit and is now 9.1 cont to monitor

## 2019-05-07 NOTE — ED TRIAGE NOTES
Chief Complaint   Patient presents with   • Shortness of Breath   • Weakness     generalized   • Loss of Appetite     Patient maria g LORD from home, states she has had no appetitie for about 3 days, states generalized weakness. Family reports she woke up this morning SOB and not herself. When EMS arrived she was 80% on RA. Came up to 90% with 4L, then was placed on NRB in route sating 98%.   Patient states she has had nasal congestion and dry cough for several months.   Currently on simple mask at 7L, sating 95%.   Patient family at bedside. EKG completed. ERP to [facility].

## 2019-05-07 NOTE — H&P
Hospital Medicine History and Physical    Date of Service  5/7/2019    Chief Complaint  Chief Complaint   Patient presents with   • Shortness of Breath   • Weakness     generalized   • Loss of Appetite       History of Presenting Illness  84 y.o. female who presented 5/7/2019 with pmhx of anxiety/depression comes in with her daughter for increasing weakess.  The daughter states that over the past 3 days she has not really been eating well and has been weak overall.  Patient states that she has also been feeling increasing shortness of breath for the past couple of days.  She also complains of some black stools intermittently over the past 6 months or so.  She has never had a colonoscopy or has seen gastroenterology.  She denies any chest pains.  No fevers chills, no vomiting.    In the ED patient was found to have a hemoglobin of 3.8  She was found to be hypoxic requiring 7 L of oxygen.  Chest x-ray interpreted by me independently showing some basilar atelectasis and interstitial edema.    Type and cross 1 units of blood ordered; monitoring H&H every 6 hours  Stool occult was positive in the ER  Primary Care Physician  Duane Patel D.O.    Consultants  none    Code Status  dnr    Review of Systems  Review of Systems   Constitutional: Positive for malaise/fatigue. Negative for chills and fever.   HENT: Positive for hearing loss.    Eyes: Negative for blurred vision and pain.   Respiratory: Positive for shortness of breath. Negative for cough.    Cardiovascular: Negative for chest pain, palpitations, orthopnea and PND.   Gastrointestinal: Positive for melena.   Genitourinary: Negative for dysuria and frequency.   Musculoskeletal: Positive for myalgias.   Skin: Negative for itching and rash.   Neurological: Positive for weakness. Negative for dizziness, tremors and headaches.   Psychiatric/Behavioral: Positive for depression. Negative for memory loss and substance abuse. The patient is nervous/anxious. The  patient does not have insomnia.         Past Medical History  Past Medical History:   Diagnosis Date   • TIA (transient ischemic attack) 2015   • Hypertension    • Stroke (HCC)        Surgical History  Past Surgical History:   Procedure Laterality Date   • HIP NAILING INTRAMEDULLARY Right 10/10/2018    Procedure: HIP NAILING INTRAMEDULLARY;  Surgeon: Ricardo Luna M.D.;  Location: SURGERY [facility];  Service: Orthopedics   • ANKLE ORIF Right 2017    Procedure: ANKLE ORIF;  Surgeon: Ricardo Luna M.D.;  Location: SURGERY [facility];  Service:        Medications  No current facility-administered medications on file prior to encounter.      Current Outpatient Prescriptions on File Prior to Encounter   Medication Sig Dispense Refill   • amlodipine (NORVASC) 10 MG Tab Take 10 mg by mouth every day. Indications: High Blood Pressure         Family History  Family History   Problem Relation Age of Onset   • Cancer Mother         breast cancer   • Cancer Sister         breast cancer       Social History  Social History   Substance Use Topics   • Smoking status: Never Smoker   • Smokeless tobacco: Not on file   • Alcohol use Yes      Comment: SOCIAL       Allergies  No Known Allergies     Physical Exam  Laboratory   Hemodynamics  Temp (24hrs), Av.8 °C (98.3 °F), Min:36.5 °C (97.7 °F), Max:37.2 °C (98.9 °F)   Temperature: 36.7 °C (98.1 °F)  Pulse  Av  Min: 84  Max: 90 Heart Rate (Monitored): 86  Blood Pressure : 113/52, NIBP: 130/52      Respiratory      Respiration: (!) 23, Pulse Oximetry: 91 %             Physical Exam   Constitutional: She is oriented to person, place, and time. She appears well-developed and well-nourished. She appears distressed.   HENT:   Head: Normocephalic and atraumatic.   Mouth/Throat: Oropharynx is clear and moist. No oropharyngeal exudate.   Eyes: Pupils are equal, round, and reactive to light. Conjunctivae and EOM are normal. No scleral icterus.   Neck: Normal range  of motion. Neck supple. No JVD present. No thyromegaly present.   Cardiovascular: Normal rate, regular rhythm and intact distal pulses.    No murmur heard.  Pulmonary/Chest: Effort normal and breath sounds normal. No respiratory distress. She exhibits no tenderness.   Abdominal: Soft. Bowel sounds are normal. She exhibits no distension. There is no rebound.   Musculoskeletal: Normal range of motion. She exhibits no edema.   Lymphadenopathy:     She has no cervical adenopathy.   Neurological: She is alert and oriented to person, place, and time. She exhibits normal muscle tone.   Skin: Skin is warm and dry. No erythema.   Psychiatric: She has a normal mood and affect. Her behavior is normal.       Recent Labs      05/07/19   1040   WBC  14.2*   RBC  1.82*   HEMOGLOBIN  3.8*   HEMATOCRIT  13.6*   MCV  74.2*   MCH  20.3*   MCHC  27.4*   RDW  49.6   PLATELETCT  622*   MPV  9.6     Recent Labs      05/07/19   1040   SODIUM  142   POTASSIUM  3.8   CHLORIDE  112   CO2  16*   GLUCOSE  131*   BUN  28*   CREATININE  1.16   CALCIUM  8.4*     Recent Labs      05/07/19   1040   ALTSGPT  22   ASTSGOT  36   ALKPHOSPHAT  70   TBILIRUBIN  0.6   GLUCOSE  131*     Recent Labs      05/07/19   1040   INR  1.27*             Lab Results   Component Value Date    TROPONINI 0.13 (H) 05/07/2019     Urinalysis:    Lab Results  Component Value Date/Time   SPECGRAVITY 1.019 10/09/2018 2121   GLUCOSEUR Negative 10/09/2018 2121   KETONES Trace (A) 10/09/2018 2121   NITRITE Negative 10/09/2018 2121   WBCURINE 2-5 10/09/2018 2121   RBCURINE 0-2 10/09/2018 2121   BACTERIA Few (A) 10/09/2018 2121   EPITHELCELL Rare 10/09/2018 2121        Imaging  1.  Small to moderate left pleural effusion with associated left basilar atelectasis versus consolidation.  2.  Mild diffuse interstitial edema.  3.  Borderline cardiomegaly.   Assessment/Plan     I anticipate this patient will require at least two midnights for appropriate medical management, necessitating  inpatient admission.    * Severe anemia   Assessment & Plan    Patient coming in with a hemoglobin of 3.8 and dyspnea on exertion and generalized weakness.  Acute severe anemia likely secondary to GI bleed black stools for several months intermittently.  She has never had a colonoscopy for Hemoccult is positive here in the ED.  GI has been consulted.  I will monitor her hemoglobins and hematocrit every 6 hours  Transfuse if hemoglobin less than 7, will have to monitor for fluid overload after giving packed RBCs.    ADDENDUM: repeat Hgb 5.3, I will transfuse her with 1U PRBC again and given he 10mg IV lasix to follow     Acute respiratory failure with hypoxia (HCC)   Assessment & Plan    Anemia versus edema versus pneumonia  Cxr: Independently in reviewed by me showed some pleural effusion on the left side with basilar atelectasis which could represent a pneumonia, she also has some interstitial edema diffusely  This time monitor H&H every 6 hours and transfuse if hemoglobin less than 7  I will order procalcitonin level and start her on ceftriaxone and azithromycin, de-escalate if procalcitonin is negative  Supplemental oxygen continued, wean as tolerated     Occult blood positive stool   Assessment & Plan    No hx of Colonoscopy per patient  Has been having some black stool intermittently over the last 6 months  Monitor h/h [id number]  hgb 3 here, transfusion protocol if <7  GI consulted by ERP  PPI twice daily has been ordered         JOVEL (dyspnea on exertion)   Assessment & Plan    Likely 2/2 acute anemia with hgb of 3.8  Type and cross  She will be given 2 Units at this time  Check H/H q6h  Supplemental oxygen, wean as tolerated         Elevated troponin- (present on admission)   Assessment & Plan    No chest pain on admission  EKG interpreted independently by me showing NSR with some early repolarization abnormalities  0.13, continue to trend,  Likely demand ischemia 2/2 acute anemia  Echo in 10/18 showed: Normal  left ventricular size, thickness and systolic function.  Mildly dilated left atrium.  Trace aortic insufficiency.  Right ventricular systolic pressure is estimated to be 30 mmHg.  Compared to the images of the prior study done 12/29/2014, aortic   insufficiency is slightly better, and estimated right-sided pressures   are lower.    Trend trop       HTN- (present on admission)   Assessment & Plan    Controlled,however BP running low lately , likely 2/2 acute anemia  IV bolus give in ER, start IVF 100ml/hr  Hold BP meds  mointor         VTE prophylaxis: gi bleed

## 2019-05-07 NOTE — CONSULTS
"Gastroenterology Consult Note:    Arnodl Hays  Date & Time note created:    5/7/2019   2:47 PM     Referring MD:  Dr. Jose G Orlando    Patient ID:   Name:             Rebecca Bishop     YOB: 1935  Age:                 84 y.o.  female   MRN:               [id number]                                                             Reason for Consult:      Severe anemia with occult blood in the stool    History of Present Illness:    Rebecca is an 84 year old woman who is  and lives with her daughter.  She was brought in today for progressive weakness and dyspnea with exertion over the past several weeks.  She was found to be markedly anemic and had dark brown stool positive for occult blood.  She states that periodically, over several years, she will note that her stool looks dark, but has never seen any fresh red blood in the stool.  She admits to recent decrease in appetite and mild post-prandial nausea.  She denies any heartburn, dysphagia, vomiting or change in bowel habits.  She does occasionally take over the counter \"pain pills\" like ibuprofen.    Review of Systems:      Constitutional: Denies fevers, Denies weight changes  Ears/Nose/Throat/Mouth: Denies nasal congestion or sore throat   Cardiovascular: Denies chest pain or palpitations.  Respiratory: Recent dyspnea with exertion.  Gastrointestinal/Hepatic: As per HPI.  Genitourinary: Denies dysuria or frequency.  Musculoskeletal/Rheum: Denies  joint pain and swelling, No leg edema  Skin: Denies rash  Neurological: Denies headache, confusion, memory loss or focal weakness/parasthesias  Psychiatric: Denies mood disorder   Endocrine: Denies thyroid problems  Heme/Oncology/Lymph Nodes: Denies enlarged lymph nodes, denies brusing or known bleeding disorder  All other systems were reviewed and are negative (AMA/CMS criteria)                Past Medical History:   Past Medical History:   Diagnosis Date   • Hypertension    • Stroke (HCC)    • TIA " (transient ischemic attack) 1/2015         Past Surgical History:  Past Surgical History:   Procedure Laterality Date   • HIP NAILING INTRAMEDULLARY Right 10/10/2018    Procedure: HIP NAILING INTRAMEDULLARY;  Surgeon: Ricardo Luna M.D.;  Location: SURGERY [facility];  Service: Orthopedics   • ANKLE ORIF Right 6/1/2017    Procedure: ANKLE ORIF;  Surgeon: Ricardo Luna M.D.;  Location: SURGERY [facility];  Service:        Hospital Medications:    Current Facility-Administered Medications:   •  citalopram (CELEXA) tablet 40 mg, 40 mg, Oral, DAILY, Loren Butler M.D.  •  senna-docusate (PERICOLACE or SENOKOT S) 8.6-50 MG per tablet 2 Tab, 2 Tab, Oral, BID **AND** polyethylene glycol/lytes (MIRALAX) PACKET 1 Packet, 1 Packet, Oral, QDAY PRN **AND** magnesium hydroxide (MILK OF MAGNESIA) suspension 30 mL, 30 mL, Oral, QDAY PRN **AND** bisacodyl (DULCOLAX) suppository 10 mg, 10 mg, Rectal, QDAY PRN, Loren Butler M.D.  •  acetaminophen (TYLENOL) tablet 650 mg, 650 mg, Oral, Q6HRS PRN, Loren Butler M.D.  •  NS infusion, , Intravenous, Continuous, Loren Butler M.D.  •  NS infusion, , Intravenous, Once, Loren Butler M.D.  •  pantoprazole (PROTONIX) 80 mg in  mL Infusion, 8 mg/hr, Intravenous, Continuous, Loren Butler M.D.    Current Outpatient Prescriptions:   •  citalopram (CELEXA) 40 MG Tab, Take 40 mg by mouth every day., Disp: , Rfl:   •  B Complex Vitamins (VITAMIN B-COMPLEX) Tab, Take 1 Tab by mouth every day., Disp: , Rfl:   •  amlodipine (NORVASC) 10 MG Tab, Take 10 mg by mouth every day. Indications: High Blood Pressure, Disp: , Rfl:     Current Outpatient Medications:  Current Facility-Administered Medications   Medication Dose Route Frequency Provider Last Rate Last Dose   • citalopram (CELEXA) tablet 40 mg  40 mg Oral DAILY Loren Butler M.D.       • senna-docusate (PERICOLACE or SENOKOT S) 8.6-50 MG per tablet 2 Tab  2 Tab Oral BID oLren Butler M.D.        And   • chelle  glycol/lytes (MIRALAX) PACKET 1 Packet  1 Packet Oral QDAY PRN Loren Butler M.D.        And   • magnesium hydroxide (MILK OF MAGNESIA) suspension 30 mL  30 mL Oral QDAY PRN Loren Butler M.D.        And   • bisacodyl (DULCOLAX) suppository 10 mg  10 mg Rectal QDAY PRN Loren Butler M.D.       • acetaminophen (TYLENOL) tablet 650 mg  650 mg Oral Q6HRS PRN Loren Butler M.D.       • NS infusion   Intravenous Continuous Loren Butler M.D.       • NS infusion   Intravenous Once Loren Butler M.D.       • pantoprazole (PROTONIX) 80 mg in  mL Infusion  8 mg/hr Intravenous Continuous Loren Butler M.D.         Current Outpatient Prescriptions   Medication Sig Dispense Refill   • citalopram (CELEXA) 40 MG Tab Take 40 mg by mouth every day.     • B Complex Vitamins (VITAMIN B-COMPLEX) Tab Take 1 Tab by mouth every day.     • amlodipine (NORVASC) 10 MG Tab Take 10 mg by mouth every day. Indications: High Blood Pressure         Medication Allergy:  No Known Allergies    Family History:  Family History   Problem Relation Age of Onset   • Cancer Mother         breast cancer   • Cancer Sister         breast cancer       Social History:  Social History     Social History   • Marital status:      Spouse name: N/A   • Number of children: N/A   • Years of education: N/A     Occupational History   • Not on file.     Social History Main Topics   • Smoking status: Never Smoker   • Smokeless tobacco: Not on file   • Alcohol use Yes      Comment: SOCIAL   • Drug use: No   • Sexual activity: Not on file     Other Topics Concern   • Not on file     Social History Narrative   • No narrative on file         Physical Exam:  Vitals/ General Appearance:   Weight/BMI: Body mass index is 18.94 kg/m².  /52   Pulse 86   Temp 36.7 °C (98.1 °F)   Resp (!) 23   Wt 44 kg (97 lb)   SpO2 91%   Vitals:    05/07/19 1315 05/07/19 1330 05/07/19 1346 05/07/19 1400   BP:       Pulse: 89 89 90 86   Resp: (!) 23 (!) 23 (!) 24 (!)  23   Temp:    36.7 °C (98.1 °F)   TempSrc:       SpO2: 89% 91% 89% 91%   Weight:         Oxygen Therapy:  Pulse Oximetry: 91 %, O2 (LPM): 7, O2 Delivery: Mask    Constitutional:   Small-framed pale elderly woman in no acute distress.  HENMT:  Normocephalic, Atraumatic, Oropharynx moist mucous membranes, Mallampati score 3, No oral exudates, Nose normal.  No thyromegaly.  Eyes:  EOMI, Conjunctiva pale, No discharge.  Neck:  Normal range of motion, No cervical tenderness,  no JVD.  Cardiovascular:  Normal heart rate, Normal rhythm, No murmurs, No rubs, No gallops.   Extremities without edema.  Lungs:  Normal breath sounds, breath sounds clear to auscultation bilaterally,  no crackles, no wheezing.   Abdomen: Bowel sounds normal, Soft, No tenderness, No guarding, No rebound, No masses, No hepatosplenomegaly.  Rectal examination not repeated.  Skin: Warm, Dry, No erythema, No rash, no induration.  Neurologic: Alert & oriented x 3, No focal deficits noted.  Psychiatric: Affect normal, Judgment normal, Mood normal.      MDM (Data Review):     Records reviewed and summarized in current documentation    Lab Data Review:  Recent Results (from the past 24 hour(s))   EKG    Collection Time: 19 10:01 AM   Result Value Ref Range    Report       [facility] Emergency [facility]    Test Date:  2019  Pt Name:    RIGOBERTO TAYLOR                  Department: ER  MRN:        [id number]                      Room:        03  Gender:     Female                       Technician: [id number]  :        1935                   Requested By:ER TRIAGE PROTOCOL  Order #:    [id number]                    Reading MD:    Measurements  Intervals                                Axis  Rate:       90                           P:          40  HI:         164                          QRS:        33  QRSD:       72                           T:          241  QT:         380  QTc:        465    Interpretive Statements  SINUS  RHYTHM  ATRIAL PREMATURE COMPLEX  BORDERLINE REPOLARIZATION ABNORMALITY  Compared to ECG 10/09/2018 19:09:28  Atrial premature complex(es) now present  Sinus tachycardia no longer present  Left ventricular hypertrophy no longer present     CBC w/ Differential    Collection Time: 05/07/19 10:40 AM   Result Value Ref Range    WBC 14.2 (H) 4.8 - 10.8 K/uL    RBC 1.82 (L) 4.20 - 5.40 M/uL    Hemoglobin 3.8 (LL) 12.0 - 16.0 g/dL    Hematocrit 13.6 (LL) 37.0 - 47.0 %    MCV 74.2 (L) 81.4 - 97.8 fL    MCH 20.3 (L) 27.0 - 33.0 pg    MCHC 27.4 (L) 33.6 - 35.0 g/dL    RDW 49.6 35.9 - 50.0 fL    Platelet Count 622 (H) 164 - 446 K/uL    MPV 9.6 9.0 - 12.9 fL    Neutrophils-Polys 84.80 (H) 44.00 - 72.00 %    Lymphocytes 8.00 (L) 22.00 - 41.00 %    Monocytes 6.10 0.00 - 13.40 %    Eosinophils 0.00 0.00 - 6.90 %    Basophils 0.30 0.00 - 1.80 %    Immature Granulocytes 0.80 0.00 - 0.90 %    Nucleated RBC 0.70 /100 WBC    Neutrophils (Absolute) 12.02 (H) 2.00 - 7.15 K/uL    Lymphs (Absolute) 1.14 1.00 - 4.80 K/uL    Monos (Absolute) 0.87 (H) 0.00 - 0.85 K/uL    Eos (Absolute) 0.00 0.00 - 0.51 K/uL    Baso (Absolute) 0.04 0.00 - 0.12 K/uL    Immature Granulocytes (abs) 0.12 (H) 0.00 - 0.11 K/uL    NRBC (Absolute) 0.10 K/uL    Hypochromia 2+     Anisocytosis 1+     Microcytosis 1+    Complete Metabolic Panel (CMP)    Collection Time: 05/07/19 10:40 AM   Result Value Ref Range    Sodium 142 135 - 145 mmol/L    Potassium 3.8 3.6 - 5.5 mmol/L    Chloride 112 96 - 112 mmol/L    Co2 16 (L) 20 - 33 mmol/L    Anion Gap 14.0 (H) 0.0 - 11.9    Glucose 131 (H) 65 - 99 mg/dL    Bun 28 (H) 8 - 22 mg/dL    Creatinine 1.16 0.50 - 1.40 mg/dL    Calcium 8.4 (L) 8.5 - 10.5 mg/dL    AST(SGOT) 36 12 - 45 U/L    ALT(SGPT) 22 2 - 50 U/L    Alkaline Phosphatase 70 30 - 99 U/L    Total Bilirubin 0.6 0.1 - 1.5 mg/dL    Albumin 3.6 3.2 - 4.9 g/dL    Total Protein 5.8 (L) 6.0 - 8.2 g/dL    Globulin 2.2 1.9 - 3.5 g/dL    A-G Ratio 1.6 g/dL   Troponin STAT     Collection Time: 05/07/19 10:40 AM   Result Value Ref Range    Troponin I 0.13 (H) 0.00 - 0.04 ng/mL   ESTIMATED GFR    Collection Time: 05/07/19 10:40 AM   Result Value Ref Range    GFR If  54 (A) >60 mL/min/1.73 m 2    GFR If Non  44 (A) >60 mL/min/1.73 m 2   COD (Adult) - Type and Crossmatch only order if transfusing RBC'S    Collection Time: 05/07/19 10:40 AM   Result Value Ref Range    ABO Grouping Only A     Rh Grouping Only POS     Antibody Screen-Cod NEG     Component R       R3                  Red Blood Cells3    [id number]   issued       05/07/19   12:36      Product Type Red Blood Cells LR Pheresis     Dispense Status issued     Unit Number (Barcoded) [id number]     Product Code (Barcoded) T5730T40     Blood Type (Barcoded) 6200    PT/INR    Collection Time: 05/07/19 10:40 AM   Result Value Ref Range    PT 16.0 (H) 12.0 - 14.6 sec    INR 1.27 (H) 0.87 - 1.13   PERIPHERAL SMEAR REVIEW    Collection Time: 05/07/19 10:40 AM   Result Value Ref Range    Peripheral Smear Review see below    PLATELET ESTIMATE    Collection Time: 05/07/19 10:40 AM   Result Value Ref Range    Plt Estimation Increased    MORPHOLOGY    Collection Time: 05/07/19 10:40 AM   Result Value Ref Range    RBC Morphology Present     Poikilocytosis 2+     Ovalocytes 1+     Schistocytes 1+     Target Cells 1+    DIFFERENTIAL COMMENT    Collection Time: 05/07/19 10:40 AM   Result Value Ref Range    Comments-Diff see below    ABO Rh Confirm    Collection Time: 05/07/19 11:25 AM   Result Value Ref Range    ABO Rh Confirm A POS        Imaging/Procedures Review:    CXR:  1. Small to moderate left pleural effusion with associated left basilar atelectasis versus consolidation.  2. Mild diffuse interstitial edema.  3. Borderline cardiomegaly.    IMPRESSIONS:  #  Severe microcytic anemia.  Hgb was 12.2 in December and is now 3.8.  This likely represents iron deficiency anemia due to chronic intermittent GI blood  "loss.  She had a low iron in 11/2018, but I don't think it was treated.  She is not actively bleeding at this time (stool dark brown), but gives a history of occasional \"dark stools\" which could be due to upper GI blood loss.  Given her age, we need to examine both her upper and lower GI tract for sources of intermittent blood loss.  Differential includes peptic ulcer disease, malignancy, AVMs, etc.  #  Periodic dark stools c/w GI bleeding.  #  Thrombocytosis.  This is also likely due to iron deficiency state.  #  Dyspnea with exertion.  She probably has some high output CHF present.  #  Stress leukemoid reaction.  #  Hyperglycemia, stress induced.  #  Elevated troponin.  #  History of TIA.  #  History of fall with right hip fracture requiring pinning 10/2018.    RECOMMENDATIONS:  - Transfuse to Hgb 7.0 or higher.  -  PPI oral or IV twice daily until endoscopy completed.  I don't think she is actively hemorrhaging and therefore a pantoprazole drip isn't necessary.  - Cardiac evaluation for elevated troponin and suspected high output CHF.  - Full liquid diet today, clear liquid diet tomorrow, and if cardiac clearance given, we can proceed to diagnostic EGD/colonoscopy the following day.          Thank your for the opportunity to assist in the care of your patient.  Please call for any questions or concerns.    Arnold Hays M.D.        "

## 2019-05-07 NOTE — ED NOTES
Med rec complete per family at bedside with medication bottles  Allergies reviewed  No PO antibiotics at home in last 30 days    Family D/C'd Vitamin B 5-6-19 due to patient not eating and thinking it was upsetting her stomach

## 2019-05-07 NOTE — ASSESSMENT & PLAN NOTE
Likely 2/2 acute anemia with hgb of 3.8  Has received 2 units and now her JOVEL is improving   Monitor

## 2019-05-08 ENCOUNTER — APPOINTMENT (OUTPATIENT)
Dept: CARDIOLOGY | Facility: MEDICAL CENTER | Age: 84
DRG: 811 | End: 2019-05-08
Attending: INTERNAL MEDICINE
Payer: MEDICARE

## 2019-05-08 LAB
ANION GAP SERPL CALC-SCNC: 10 MMOL/L (ref 0–11.9)
BASOPHILS # BLD AUTO: 0.8 % (ref 0–1.8)
BASOPHILS # BLD: 0.13 K/UL (ref 0–0.12)
BUN SERPL-MCNC: 20 MG/DL (ref 8–22)
CALCIUM SERPL-MCNC: 8.4 MG/DL (ref 8.5–10.5)
CHLORIDE SERPL-SCNC: 111 MMOL/L (ref 96–112)
CO2 SERPL-SCNC: 19 MMOL/L (ref 20–33)
CREAT SERPL-MCNC: 0.98 MG/DL (ref 0.5–1.4)
EOSINOPHIL # BLD AUTO: 0.11 K/UL (ref 0–0.51)
EOSINOPHIL NFR BLD: 0.6 % (ref 0–6.9)
ERYTHROCYTE [DISTWIDTH] IN BLOOD BY AUTOMATED COUNT: 54.3 FL (ref 35.9–50)
GLUCOSE SERPL-MCNC: 100 MG/DL (ref 65–99)
HCT VFR BLD AUTO: 23.5 % (ref 37–47)
HCT VFR BLD AUTO: 23.7 % (ref 37–47)
HCT VFR BLD AUTO: 23.8 % (ref 37–47)
HCT VFR BLD AUTO: 23.9 % (ref 37–47)
HGB BLD-MCNC: 7.1 G/DL (ref 12–16)
HGB BLD-MCNC: 7.1 G/DL (ref 12–16)
HGB BLD-MCNC: 7.3 G/DL (ref 12–16)
HGB BLD-MCNC: 7.4 G/DL (ref 12–16)
IMM GRANULOCYTES # BLD AUTO: 0.14 K/UL (ref 0–0.11)
IMM GRANULOCYTES NFR BLD AUTO: 0.8 % (ref 0–0.9)
LV EJECT FRACT  99904: 60
LV EJECT FRACT MOD 4C 99902: 39.33
LYMPHOCYTES # BLD AUTO: 1.56 K/UL (ref 1–4.8)
LYMPHOCYTES NFR BLD: 9.2 % (ref 22–41)
MCH RBC QN AUTO: 23.9 PG (ref 27–33)
MCHC RBC AUTO-ENTMCNC: 30.2 G/DL (ref 33.6–35)
MCV RBC AUTO: 79.1 FL (ref 81.4–97.8)
MONOCYTES # BLD AUTO: 1.43 K/UL (ref 0–0.85)
MONOCYTES NFR BLD AUTO: 8.4 % (ref 0–13.4)
NEUTROPHILS # BLD AUTO: 13.57 K/UL (ref 2–7.15)
NEUTROPHILS NFR BLD: 80.2 % (ref 44–72)
NRBC # BLD AUTO: 0.13 K/UL
NRBC BLD-RTO: 0.8 /100 WBC
PLATELET # BLD AUTO: 550 K/UL (ref 164–446)
PMV BLD AUTO: 9.4 FL (ref 9–12.9)
POTASSIUM SERPL-SCNC: 3.3 MMOL/L (ref 3.6–5.5)
POTASSIUM SERPL-SCNC: 3.3 MMOL/L (ref 3.6–5.5)
RBC # BLD AUTO: 2.97 M/UL (ref 4.2–5.4)
SODIUM SERPL-SCNC: 140 MMOL/L (ref 135–145)
TROPONIN I SERPL-MCNC: 0.87 NG/ML (ref 0–0.04)
WBC # BLD AUTO: 16.9 K/UL (ref 4.8–10.8)

## 2019-05-08 PROCEDURE — 99232 SBSQ HOSP IP/OBS MODERATE 35: CPT | Performed by: INTERNAL MEDICINE

## 2019-05-08 PROCEDURE — 85018 HEMOGLOBIN: CPT

## 2019-05-08 PROCEDURE — 84132 ASSAY OF SERUM POTASSIUM: CPT

## 2019-05-08 PROCEDURE — 85025 COMPLETE CBC W/AUTO DIFF WBC: CPT

## 2019-05-08 PROCEDURE — A9270 NON-COVERED ITEM OR SERVICE: HCPCS | Performed by: HOSPITALIST

## 2019-05-08 PROCEDURE — 84484 ASSAY OF TROPONIN QUANT: CPT

## 2019-05-08 PROCEDURE — 93306 TTE W/DOPPLER COMPLETE: CPT | Mod: 26 | Performed by: INTERNAL MEDICINE

## 2019-05-08 PROCEDURE — 99222 1ST HOSP IP/OBS MODERATE 55: CPT | Performed by: INTERNAL MEDICINE

## 2019-05-08 PROCEDURE — 700111 HCHG RX REV CODE 636 W/ 250 OVERRIDE (IP): Performed by: HOSPITALIST

## 2019-05-08 PROCEDURE — 93306 TTE W/DOPPLER COMPLETE: CPT

## 2019-05-08 PROCEDURE — 36415 COLL VENOUS BLD VENIPUNCTURE: CPT

## 2019-05-08 PROCEDURE — 80048 BASIC METABOLIC PNL TOTAL CA: CPT

## 2019-05-08 PROCEDURE — 700102 HCHG RX REV CODE 250 W/ 637 OVERRIDE(OP): Performed by: INTERNAL MEDICINE

## 2019-05-08 PROCEDURE — 700102 HCHG RX REV CODE 250 W/ 637 OVERRIDE(OP): Performed by: HOSPITALIST

## 2019-05-08 PROCEDURE — A9270 NON-COVERED ITEM OR SERVICE: HCPCS | Performed by: INTERNAL MEDICINE

## 2019-05-08 PROCEDURE — 85014 HEMATOCRIT: CPT

## 2019-05-08 PROCEDURE — 770020 HCHG ROOM/CARE - TELE (206)

## 2019-05-08 RX ORDER — POTASSIUM CHLORIDE 20 MEQ/1
40 TABLET, EXTENDED RELEASE ORAL 2 TIMES DAILY
Status: DISCONTINUED | OUTPATIENT
Start: 2019-05-08 | End: 2019-05-10

## 2019-05-08 RX ORDER — SODIUM CHLORIDE 9 MG/ML
INJECTION, SOLUTION INTRAVENOUS
Status: ACTIVE
Start: 2019-05-08 | End: 2019-05-08

## 2019-05-08 RX ORDER — POTASSIUM CHLORIDE 20 MEQ/1
40 TABLET, EXTENDED RELEASE ORAL ONCE
Status: DISCONTINUED | OUTPATIENT
Start: 2019-05-08 | End: 2019-05-08

## 2019-05-08 RX ORDER — POTASSIUM CHLORIDE 7.45 MG/ML
10 INJECTION INTRAVENOUS ONCE
Status: COMPLETED | OUTPATIENT
Start: 2019-05-08 | End: 2019-05-08

## 2019-05-08 RX ADMIN — POTASSIUM CHLORIDE 40 MEQ: 1500 TABLET, EXTENDED RELEASE ORAL at 14:34

## 2019-05-08 RX ADMIN — POTASSIUM CHLORIDE 10 MEQ: 7.46 INJECTION, SOLUTION INTRAVENOUS at 08:37

## 2019-05-08 RX ADMIN — OMEPRAZOLE 20 MG: 20 CAPSULE, DELAYED RELEASE ORAL at 18:02

## 2019-05-08 RX ADMIN — POTASSIUM CHLORIDE 10 MEQ: 7.46 INJECTION, SOLUTION INTRAVENOUS at 05:23

## 2019-05-08 RX ADMIN — CITALOPRAM HYDROBROMIDE 40 MG: 40 TABLET ORAL at 05:16

## 2019-05-08 RX ADMIN — OMEPRAZOLE 20 MG: 20 CAPSULE, DELAYED RELEASE ORAL at 05:16

## 2019-05-08 RX ADMIN — SENNOSIDES, DOCUSATE SODIUM 2 TABLET: 50; 8.6 TABLET, FILM COATED ORAL at 05:16

## 2019-05-08 ASSESSMENT — PATIENT HEALTH QUESTIONNAIRE - PHQ9
2. FEELING DOWN, DEPRESSED, IRRITABLE, OR HOPELESS: NOT AT ALL
1. LITTLE INTEREST OR PLEASURE IN DOING THINGS: NOT AT ALL
SUM OF ALL RESPONSES TO PHQ9 QUESTIONS 1 AND 2: 0

## 2019-05-08 ASSESSMENT — ENCOUNTER SYMPTOMS
COUGH: 0
MYALGIAS: 0
DIZZINESS: 0
SHORTNESS OF BREATH: 1
SPUTUM PRODUCTION: 0
WEAKNESS: 1
CHILLS: 0
WHEEZING: 0
FEVER: 0
BACK PAIN: 0
NAUSEA: 0
HEADACHES: 0
PALPITATIONS: 0
VOMITING: 0
ABDOMINAL PAIN: 0

## 2019-05-08 NOTE — RESPIRATORY CARE
COPD EDUCATION by COPD CLINICAL EDUCATOR  5/8/2019 at 6:34 AM by Kassie Crews     Patient reviewed by COPD education team. Patient does not have a history or diagnosis of COPD, is a non-smoker and does not have a Respiratory Care Protocol, therefore does not qualify for the COPD program.

## 2019-05-08 NOTE — CONSULTS
DATE OF SERVICE:  05/07/2019    CARDIOLOGY CONSULTATION    REFERRING PHYSICIAN:  Lexx Crowley MD    CHIEF COMPLAINT:  1.  Preoperative cardiovascular evaluation.  2.  Abnormal EKG and abnormal troponin level.    HISTORY OF PRESENT ILLNESS:  The patient is an 84-year-old female without   previous cardiac history.  She was admitted to [facility] with   fatigue and melena and was diagnosed with profound anemia with hemoglobin down   to 3.8 g/dL and hematocrit of 13.6%.  In addition, she underwent cardiac   evaluation showing troponin of 0.87 and EKG showing sinus rhythm with   anterolateral ST depression.  She denies chest pain.  She does admit to   shortness of breath and extreme fatigue.    ALLERGIES:  No known drug allergies.    MEDICATIONS:  Celexa 40 mg per day, omeprazole 20 mg b.i.d., KCl 10 mEq daily.    PAST MEDICAL ILLNESS:  As above plus history of hypertension and TIA/stroke in   2015.    PAST SURGICAL HISTORY:  Ankle surgery, hip surgery.    SOCIAL HISTORY:  She is .  Negative for tobacco abuse.    FAMILY HISTORY:  Negative for early coronary artery disease.    REVIEW OF SYSTEMS:  Positive for fatigue, shortness of breath, melena.  GENERAL: The patient denies fever, chills or weight changes.   HEENT: The patient denies headache, visual or hearing changes.   CENTRAL NERVOUS SYSTEM: The patient denies lightheadedness,   syncope or seizures.   ENDOCRINE: The patient denies thyroid disorder or diabetes.   RESPIRATORY: The patient denies productive cough, asthma or emphysema.  CARDIOVASCULAR: As above.   GASTROINTESTINAL: As above.  GENITOURINARY: The patient denies urinary changes.   PSYCHIATRIC: The patient denies depression or anxiety.   EXTREMITIES: The patient denies arthritis.    PHYSICAL EXAMINATION:  Includes:  VITAL SIGNS:  Pulse 83, respirations 20, /58, temperature 37.1.  GENERAL:  No acute distress.  HEENT:  Eyes equal, oral moist.  NECK:  Supple.  RESPIRATORY:  Clear to  auscultation bilaterally.  Good effort.  CARDIOVASCULAR:  S1 and S2, regular rate and rhythm without S3, S4 or murmur.  ABDOMEN:  Soft, nondistended, nontender.  No hepatosplenomegaly noted.  EXTREMITIES:  Upper extremities, no clubbing or cyanosis.  Lower extremities,    no edema.  NEUROLOGIC:  Alert and oriented x3.  PSYCHIATRIC:  No anxiety or depression.  SKIN:  Warm and dry.    CARDIAC STUDIES/PROCEDURES:      ECHOCARDIOGRAM CONCLUSIONS (10/12/18)  Normal left ventricular size, thickness and systolic function.  Mildly dilated left atrium.  Trace aortic insufficiency.  Right ventricular systolic pressure is estimated to be 30 mmHg.  Compared to the images of the prior study done 12/29/2014, aortic   insufficiency is slightly better, and estimated right-sided pressures are lower.  (study result reviewed)    EKG performed on (05/07/19) was reviewed: EKG shows sinus rhythm with anterolateral ST segment depression.    Laboratory examination on 05/08/2019 shows troponin I level of 0.87 ng/mL.    ASSESSMENT AND PLAN:  1.  Preoperative cardiovascular evaluation with abnormal EKG and abnormal   troponin level:  The patient is an 84-year-old female without previous cardiac   history.  She was admitted to [facility] with fatigue and melena   and diagnosed with GI bleed with profound anemia.  She has received   transfusions.  Incidentally, her troponin and EKGs were abnormal as described   above.  We will perform a nuclear stress test.  2.  Hypertension:  Blood pressure is well controlled.  3.  History of transient ischemic attack/stroke:  She had no recurrence of   stroke-like symptoms.  4.  Gastrointestinal bleed/anemia:  Plan for EGD, colonoscopy in the near   future.    Thank you for this consult.       ____________________________________     MD RFANKLYN JOYNER / ROXANA    DD:  05/08/2019 09:05:58  DT:  05/08/2019 10:05:59    D#:  [id number]  Job#:  [id number]

## 2019-05-08 NOTE — PROGRESS NOTES
Hospital Medicine Daily Progress Note    Date of Service  5/8/2019    Chief Complaint  84 y.o. female admitted 5/7/2019 with weakness and SOB     Hospital Course    This is a 83 y/o F with pmhx of anxiety/depression presents to ER on 5/7/19  in with her daughter for increasing weakness. The daughter states that over the past 3 days she has not really been eating well and has been weak overall. In Er pt has been found to have severe anemia with Hg of 3. Pt states was having black stools intermittently over the past 6 months or so.  Her occult stool blood is positive so GI has been consulted, but her troponin is also elevated, and is trending up, so Cardiology has been consulted.        Interval Problem Update  Pt seen and examined, still SOB , but better as per pt. Has received 2 units of RBC so fa, Hg today 7.4, Gi following and waiting for cardiology clearance, cardiology has been consulted and planing for stress test tomorrow.   Appreciate rec.     Consultants/Specialty  GI  Cardiology     Code Status  DNR/DNI    Disposition  TBD     Review of Systems  Review of Systems   Constitutional: Positive for malaise/fatigue. Negative for chills and fever.   HENT: Negative for congestion and ear pain.    Respiratory: Positive for shortness of breath. Negative for cough, sputum production and wheezing.    Cardiovascular: Negative for chest pain and palpitations.   Gastrointestinal: Positive for melena. Negative for nausea and vomiting.   Genitourinary: Negative for dysuria and urgency.   Musculoskeletal: Negative for back pain and myalgias.   Skin: Negative for itching and rash.   Neurological: Positive for weakness. Negative for dizziness and headaches.   All other systems reviewed and are negative.       Physical Exam  Temp:  [36.4 °C (97.6 °F)-37.7 °C (99.8 °F)] 37.1 °C (98.7 °F)  Pulse:  [83-93] 91  Resp:  [18-30] 18  BP: (120-149)/(57-70) 130/60  SpO2:  [88 %-95 %] 91 %    Physical Exam   Constitutional: She is  oriented to person, place, and time.   HENT:   Head: Normocephalic and atraumatic.   Eyes: Conjunctivae are normal. No scleral icterus.   Neck: Neck supple. No JVD present.   Cardiovascular: Normal rate.  Exam reveals no gallop.    Pulmonary/Chest: She has no wheezes. She has no rales.   Abdominal: Soft. Bowel sounds are normal. She exhibits no distension. There is no tenderness.   Musculoskeletal: She exhibits no edema.   Neurological: She is alert and oriented to person, place, and time. No cranial nerve deficit.   Skin: Skin is warm and dry.   Nursing note and vitals reviewed.      Fluids    Intake/Output Summary (Last 24 hours) at 05/08/19 1335  Last data filed at 05/08/19 0800   Gross per 24 hour   Intake             1182 ml   Output              250 ml   Net              932 ml       Laboratory  Recent Labs      05/07/19   1040   05/07/19   2150  05/08/19   0327  05/08/19   1132   WBC  14.2*   --    --   16.9*   --    RBC  1.82*   --    --   2.97*   --    HEMOGLOBIN  3.8*   < >  7.4*  7.1*  7.4*   HEMATOCRIT  13.6*   < >  24.1*  23.5*  23.7*   MCV  74.2*   --    --   79.1*   --    MCH  20.3*   --    --   23.9*   --    MCHC  27.4*   --    --   30.2*   --    RDW  49.6   --    --   54.3*   --    PLATELETCT  622*   --    --   550*   --    MPV  9.6   --    --   9.4   --     < > = values in this interval not displayed.     Recent Labs      05/07/19   1040  05/08/19   0327  05/08/19   1132   SODIUM  142  140   --    POTASSIUM  3.8  3.3*  3.3*   CHLORIDE  112  111   --    CO2  16*  19*   --    GLUCOSE  131*  100*   --    BUN  28*  20   --    CREATININE  1.16  0.98   --    CALCIUM  8.4*  8.4*   --      Recent Labs      05/07/19   1040   INR  1.27*               Imaging  DX-CHEST-PORTABLE (1 VIEW)   Final Result      1.  Small to moderate left pleural effusion with associated left basilar atelectasis versus consolidation.   2.  Mild diffuse interstitial edema.   3.  Borderline cardiomegaly.      EC-ECHOCARDIOGRAM  COMPLETE W/O CONT    (Results Pending)        Assessment/Plan  * Severe anemia   Assessment & Plan    Patient coming in with a hemoglobin of 3.8 and dyspnea on exertion and generalized weakness.  Acute severe anemia likely secondary to GI bleed black stools for several months intermittently.  She has never had a colonoscopy for Hemoccult is positive here in the ED.  GI has been consulted and plan for EGD/colonoscopy once cleared by cardiology   Has received 2 units of RBC so far  Monitor H/h and transfuse if less then 7      Acute respiratory failure with hypoxia (HCC)   Assessment & Plan    Anemia versus edema versus pneumonia  improving now that anemia is better   cont on ceftriaxone and azithromycin, de-escalate if procalcitonin is negative  Supplemental oxygen continued, wean as tolerated     Occult blood positive stool   Assessment & Plan    No hx of Colonoscopy per patient  Has been having some black stool intermittently over the last 6 months  Monitor h/h [id number]  hgb 3 in ER, has received 2 units of RBC so far.   Hemoglobin now 7.4   GI  Has been consulted but will need cardiology clearance before doing an EGD/colonoscopy  Cardiology has been consulted.   PPI twice daily has been ordered         JOVEL (dyspnea on exertion)   Assessment & Plan    Likely 2/2 acute anemia with hgb of 3.8  Has received 2 units and now her JOVEL is improving   Monitor        Elevated troponin- (present on admission)   Assessment & Plan    No chest pain on admission  Troponin trending up, cardiology consulted, plan for stress test tomorrow appreciate rec.   Appreciate rec.        Hypokalemia- (present on admission)   Assessment & Plan    Replete as needed and monitor  Check mag      HTN- (present on admission)   Assessment & Plan    Controlled,however BP running low lately , likely 2/2 acute anemia  IV bolus give in ER, start IVF 100ml/hr  Hold BP meds  Monitor           VTE prophylaxis: scd

## 2019-05-08 NOTE — PROGRESS NOTES
· 2 RN skin check complete with SHERRELL [place].  · Devices in place: oximask, PIV  .  · Skin assessed under devices oximask.  · No confirmed pressure ulcers  · No New potential pressure ulcers noted.  · The following interventions in place: Moisturizer at bedside, patient turns self in bed, pillows and extra blankets provided, pressure redistribution bed.

## 2019-05-08 NOTE — PROGRESS NOTES
"  Gastroenterology Progress Note     Author: Arnold Hays   Date & Time Created: 5/8/2019 8:54 AM    Reason for Consult:      Severe anemia with occult blood in the stool     History of Present Illness:    Rebecca is an 84 year old woman who is  and lives with her daughter.  She was brought in today for progressive weakness and dyspnea with exertion over the past several weeks.  She was found to be markedly anemic and had dark brown stool positive for occult blood.  She states that periodically, over several years, she will note that her stool looks dark, but has never seen any fresh red blood in the stool.  She admits to recent decrease in appetite and mild post-prandial nausea.  She denies any heartburn, dysphagia, vomiting or change in bowel habits.  She does occasionally take over the counter \"pain pills\" like ibuprofen.    Course:  5/8/2018:  Hgb zoë from 3.8 to 7.4 with 2 units, now 7.1.  She passed a large brown stool last night.  Her bowel sounds are very loud, but she denies pain, distention, nausea and vomiting.  Requiring supplemental oxygen.  Troponin-I zoë to 0.87 and cardiology will be seeing today.    Review of Systems:  Review of Systems   Respiratory: Positive for shortness of breath (Mild with exertion.). Negative for cough and wheezing.    Cardiovascular: Negative for chest pain and palpitations.   Gastrointestinal: Negative for abdominal pain, melena, nausea and vomiting.       Physical Exam:  Physical Exam   Constitutional: She is oriented to person, place, and time. No distress.   Pale appearing elderly woman in good spirits.   Cardiovascular: Regular rhythm.    No murmur heard.  Pulmonary/Chest: She has no wheezes. She has no rales.   Decreased breath sounds at bases bilaterally.   Abdominal: Soft. She exhibits no distension and no mass. There is no tenderness.   Hyperactive bowel sounds.   Neurological: She is alert and oriented to person, place, and time.       Labs:      Recent Labs "      19   10419   0732   TROPONINI  0.13*  0.47*  0.87*     Recent Labs      19   SODIUM  142  140   POTASSIUM  3.8  3.3*   CHLORIDE  112  111   CO2  16*  19*   BUN  28*  20   CREATININE  1.16  0.98   CALCIUM  8.4*  8.4*     Recent Labs      19   032   ALTSGPT  22   --    ASTSGOT  36   --    ALKPHOSPHAT  70   --    TBILIRUBIN  0.6   --    GLUCOSE  131*  100*     Recent Labs      19   14319   RBC  1.82*   --    --   2.97*   HEMOGLOBIN  3.8*  5.4*  7.4*  7.1*   HEMATOCRIT  13.6*  18.3*  24.1*  23.5*   PLATELETCT  622*   --    --   550*   PROTHROMBTM  16.0*   --    --    --    INR  1.27*   --    --    --    IRON  <10*   --    --    --    FERRITIN  5.0*   --    --    --    TOTIRONBC  428   --    --    --      Recent Labs      19   WBC  14.2*  16.9*   NEUTSPOLYS  84.80*  80.20*   LYMPHOCYTES  8.00*  9.20*   MONOCYTES  6.10  8.40   EOSINOPHILS  0.00  0.60   BASOPHILS  0.30  0.80   ASTSGOT  36   --    ALTSGPT  22   --    ALKPHOSPHAT  70   --    TBILIRUBIN  0.6   --      Hemodynamics:  Temp (24hrs), Av °C (98.6 °F), Min:36.5 °C (97.7 °F), Max:37.7 °C (99.8 °F)  Temperature: 37.1 °C (98.7 °F)  Pulse  Av  Min: 83  Max: 93Heart Rate (Monitored): 85  Blood Pressure : 120/58, NIBP: 124/55     Respiratory:    Respiration: 20, Pulse Oximetry: 91 %     Work Of Breathing / Effort: Mild  RUL Breath Sounds: Clear, RML Breath Sounds: Clear, RLL Breath Sounds: Clear, INGRID Breath Sounds: Clear, LLL Breath Sounds: Clear  Fluids:    Intake/Output Summary (Last 24 hours) at 19 0854  Last data filed at 19 0523   Gross per 24 hour   Intake             1002 ml   Output              250 ml   Net              752 ml     Weight: 47.3 kg (104 lb 4.4 oz)  GI/Nutrition:  Orders Placed This Encounter   Procedures   • Diet NPO     Standing Status:   Standing      Number of Occurrences:   1     Order Specific Question:   Restrict to:     Answer:   Sips with Medications [3]     Medical Decision Making, by Problem:  Active Hospital Problems    Diagnosis   • *Severe anemia [D64.9]   • Occult blood positive stool [R19.5]   • Acute respiratory failure with hypoxia (HCC) [J96.01]   • JOVEL (dyspnea on exertion) [R06.09]   • Elevated troponin [R74.8]   • HTN [I10]       IMPRESSIONS:  #  Severe iron deficiency anemia due to intermittent longstanding blood loss.  In safe range after transfusions.  #  Periodic dark stools c/w GI bleeding.  Presently brown, but positive for occult blood.  #  Thrombocytosis due to iron deficiency state.  #  Dyspnea with exertion.  She probably has some high output CHF present.  #  Stress leukemoid reaction.  #  Hyperglycemia, stress induced, improved.  #  Elevated troponin-I.  Cardiology will be assessing today.  #  History of TIA.  #  History of fall with right hip fracture requiring pinning 10/2018.     RECOMMENDATIONS:  - Continue omeprazole 20 mg BID.  - IV iron replacement therapy to expedite replenishment.  - Full liquid diet.  - Once we have cardiovascular clearance, she will be scheduled for EGD/colonoscopy after bowel preparation.  Dr. Anastacia Smith will be taking over for me tomorrow.         Quality-Core Measures

## 2019-05-08 NOTE — PROGRESS NOTES
"Dyspnea and oxygen desaturation with minimal exertion, activity intolerance noted. sp02 on 10L oxymask of 91%, down to 87% with turning/repositioning. Pt denies any increasing difficultly breathing, \"it's not better or worse\". Continuous pulse oximetry monitoring in place. Will continue to monitor.   "

## 2019-05-08 NOTE — PROGRESS NOTES
Increase in troponin, pt declines CP. K+ of 3.3, pt with diarrhea this shift. 02 demand remains at 10L NC, significant activity intolerance, pt desats to 87% on 10L with min exertion. Dr Rosales updated, new orders received, will continue to monitor.

## 2019-05-08 NOTE — CARE PLAN
Problem: Communication  Goal: The ability to communicate needs accurately and effectively will improve  Outcome: PROGRESSING AS EXPECTED      Problem: Safety  Goal: Will remain free from injury  Outcome: PROGRESSING AS EXPECTED    Goal: Will remain free from falls  Outcome: PROGRESSING AS EXPECTED      Problem: Infection  Goal: Will remain free from infection  Outcome: PROGRESSING AS EXPECTED      Problem: Venous Thromboembolism (VTW)/Deep Vein Thrombosis (DVT) Prevention:  Goal: Patient will participate in Venous Thrombosis (VTE)/Deep Vein Thrombosis (DVT)Prevention Measures  Outcome: PROGRESSING AS EXPECTED      Problem: Bowel/Gastric:  Goal: Normal bowel function is maintained or improved  Outcome: PROGRESSING AS EXPECTED    Goal: Will not experience complications related to bowel motility  Outcome: PROGRESSING AS EXPECTED      Problem: Knowledge Deficit  Goal: Knowledge of disease process/condition, treatment plan, diagnostic tests, and medications will improve  Outcome: PROGRESSING AS EXPECTED    Goal: Knowledge of the prescribed therapeutic regimen will improve  Outcome: PROGRESSING AS EXPECTED      Problem: Discharge Barriers/Planning  Goal: Patient's continuum of care needs will be met  Outcome: PROGRESSING AS EXPECTED      Problem: Respiratory:  Goal: Respiratory status will improve  Outcome: PROGRESSING AS EXPECTED      Problem: Urinary Elimination:  Goal: Ability to reestablish a normal urinary elimination pattern will improve  Outcome: PROGRESSING AS EXPECTED

## 2019-05-08 NOTE — DIETARY
Nutrition services: Day 1 of admit.  Rebecca Bishop is a 84 y.o. female with admitting DX of Severe Anemia (w/occult blood positive stool).    Consult received for Poor PO on admit    Visit with pt and son at bedside. Pt appears thin, but adequately nourished. Pt with reduced appetite/intake x7 days. Pt stated appetite somewhat improved, she sometimes drinks Boost at home. Pt declined supplements at this time. Pt agreeable to small portions, snack BID and high protein milkshake with dinner. Pt unsure of UBW.      Assessment:  Height: 152.4 cm (5')  Weight: 47.3 kg (104 lb 4.4 oz) Chart Review weight 10/10/18 - 51.3kg via bed scale  Body mass index is 20.37 kg/m².  Diet/Intake: Cardiac Full liquid. 25-50% dinner (5/7)    Evaluation:   1. Per chart review, 7.8% weight loss over 6 months which does not meet criteria for malnutrition.   2. PMH: anxiety/depression  3. Labs: K 3.3  4. Meds: Lasix, KCl (IV and PO)     Malnutrition Risk: Pt is at risk related to poor PO intake >5 days, unable to determine accurate weight loss timeframe. Does not meet criteria for malnutrition diagnosis at this time.    Recommendations/Plan:  1. Advance diet as tolerated  2. Small portions, snacks (when diet advances)   3. Encourage intake of meals  4. Document intake of all meals  as % taken in ADL's to provide interdisciplinary communication across all shifts.   5. Monitor weight.  6. Nutrition rep will continue to see patient for ongoing meal and snack preferences.     RD following

## 2019-05-08 NOTE — PROGRESS NOTES
Critical lab troponin 0.87 Dr. Crowley was informed. Cardiology going to consult. Patient not having any chest pain but does have SOB. Will continue to monitor.

## 2019-05-08 NOTE — PROGRESS NOTES
Bedside report received, pt care assumed. Pt is a&ox3, disoriented to time, aware of current situation. Pt declines any current needs, denies pain, no acute distress. Pt educated on plan of care, call bell use, future orders- states understanding and has rang call bell appropriately. Bed alarm in place for safety. Will continue to monitor.

## 2019-05-08 NOTE — PROGRESS NOTES
Pt. Arrived via stretcher thru ER . Assumed care. Pt. Is awake, on bed. Initial vital signs taken and documented. Admit profile, med rec and assessment complete .A&Ox4 Up x1 assist,  No pain,due medications given. On continuous IV infusion at 30ml/hr* , tolerating well. Pt. On 10 L* O2 via oximask, tolerating well. Plan of care was explained.  Discussed plan of care. Bed alarm in use, call light and personal belongings within reach, bed kept low, treaded socks on. Assisted as necessary. Kept rested and comfortable at all times.

## 2019-05-09 ENCOUNTER — APPOINTMENT (OUTPATIENT)
Dept: RADIOLOGY | Facility: MEDICAL CENTER | Age: 84
DRG: 811 | End: 2019-05-09
Attending: NURSE PRACTITIONER
Payer: MEDICARE

## 2019-05-09 LAB
ANION GAP SERPL CALC-SCNC: 12 MMOL/L (ref 0–11.9)
BUN SERPL-MCNC: 11 MG/DL (ref 8–22)
CALCIUM SERPL-MCNC: 8.6 MG/DL (ref 8.5–10.5)
CHLORIDE SERPL-SCNC: 111 MMOL/L (ref 96–112)
CO2 SERPL-SCNC: 19 MMOL/L (ref 20–33)
CREAT SERPL-MCNC: 0.81 MG/DL (ref 0.5–1.4)
ERYTHROCYTE [DISTWIDTH] IN BLOOD BY AUTOMATED COUNT: 56.9 FL (ref 35.9–50)
GLUCOSE SERPL-MCNC: 100 MG/DL (ref 65–99)
HCT VFR BLD AUTO: 23.6 % (ref 37–47)
HCT VFR BLD AUTO: 24.4 % (ref 37–47)
HCT VFR BLD AUTO: 24.5 % (ref 37–47)
HCT VFR BLD AUTO: 25.8 % (ref 37–47)
HGB BLD-MCNC: 7.1 G/DL (ref 12–16)
HGB BLD-MCNC: 7.4 G/DL (ref 12–16)
HGB BLD-MCNC: 7.4 G/DL (ref 12–16)
HGB BLD-MCNC: 8 G/DL (ref 12–16)
MAGNESIUM SERPL-MCNC: 1.9 MG/DL (ref 1.5–2.5)
MCH RBC QN AUTO: 24.4 PG (ref 27–33)
MCHC RBC AUTO-ENTMCNC: 30.3 G/DL (ref 33.6–35)
MCV RBC AUTO: 80.5 FL (ref 81.4–97.8)
PLATELET # BLD AUTO: 504 K/UL (ref 164–446)
PMV BLD AUTO: 9.3 FL (ref 9–12.9)
POTASSIUM SERPL-SCNC: 3.9 MMOL/L (ref 3.6–5.5)
RBC # BLD AUTO: 3.03 M/UL (ref 4.2–5.4)
SODIUM SERPL-SCNC: 142 MMOL/L (ref 135–145)
TROPONIN I SERPL-MCNC: 0.42 NG/ML (ref 0–0.04)
WBC # BLD AUTO: 11.5 K/UL (ref 4.8–10.8)

## 2019-05-09 PROCEDURE — 83735 ASSAY OF MAGNESIUM: CPT

## 2019-05-09 PROCEDURE — 700111 HCHG RX REV CODE 636 W/ 250 OVERRIDE (IP)

## 2019-05-09 PROCEDURE — 36415 COLL VENOUS BLD VENIPUNCTURE: CPT

## 2019-05-09 PROCEDURE — A9502 TC99M TETROFOSMIN: HCPCS

## 2019-05-09 PROCEDURE — 85027 COMPLETE CBC AUTOMATED: CPT

## 2019-05-09 PROCEDURE — 51798 US URINE CAPACITY MEASURE: CPT

## 2019-05-09 PROCEDURE — 700102 HCHG RX REV CODE 250 W/ 637 OVERRIDE(OP): Performed by: HOSPITALIST

## 2019-05-09 PROCEDURE — 770020 HCHG ROOM/CARE - TELE (206)

## 2019-05-09 PROCEDURE — 85014 HEMATOCRIT: CPT

## 2019-05-09 PROCEDURE — 84484 ASSAY OF TROPONIN QUANT: CPT

## 2019-05-09 PROCEDURE — 85018 HEMOGLOBIN: CPT

## 2019-05-09 PROCEDURE — 99232 SBSQ HOSP IP/OBS MODERATE 35: CPT | Performed by: INTERNAL MEDICINE

## 2019-05-09 PROCEDURE — A9270 NON-COVERED ITEM OR SERVICE: HCPCS | Performed by: INTERNAL MEDICINE

## 2019-05-09 PROCEDURE — 700102 HCHG RX REV CODE 250 W/ 637 OVERRIDE(OP): Performed by: INTERNAL MEDICINE

## 2019-05-09 PROCEDURE — 80048 BASIC METABOLIC PNL TOTAL CA: CPT

## 2019-05-09 PROCEDURE — A9270 NON-COVERED ITEM OR SERVICE: HCPCS | Performed by: HOSPITALIST

## 2019-05-09 RX ORDER — REGADENOSON 0.08 MG/ML
INJECTION, SOLUTION INTRAVENOUS
Status: COMPLETED
Start: 2019-05-09 | End: 2019-05-09

## 2019-05-09 RX ADMIN — OMEPRAZOLE 20 MG: 20 CAPSULE, DELAYED RELEASE ORAL at 17:30

## 2019-05-09 RX ADMIN — POTASSIUM CHLORIDE 40 MEQ: 1500 TABLET, EXTENDED RELEASE ORAL at 17:30

## 2019-05-09 RX ADMIN — REGADENOSON 0.4 MG: 0.08 INJECTION, SOLUTION INTRAVENOUS at 15:12

## 2019-05-09 ASSESSMENT — ENCOUNTER SYMPTOMS
CARDIOVASCULAR NEGATIVE: 1
MYALGIAS: 0
CHILLS: 0
EYES NEGATIVE: 1
CONSTITUTIONAL NEGATIVE: 1
WHEEZING: 0
BRUISES/BLEEDS EASILY: 0
SHORTNESS OF BREATH: 1
VOMITING: 0
PALPITATIONS: 0
MUSCULOSKELETAL NEGATIVE: 1
COUGH: 0
RESPIRATORY NEGATIVE: 1
GASTROINTESTINAL NEGATIVE: 1
FEVER: 0
WEAKNESS: 0
HEADACHES: 0
NEUROLOGICAL NEGATIVE: 1
WEAKNESS: 1
SPUTUM PRODUCTION: 0
NERVOUS/ANXIOUS: 0
BACK PAIN: 0
DIZZINESS: 0
ABDOMINAL PAIN: 0
SHORTNESS OF BREATH: 0
PSYCHIATRIC NEGATIVE: 1
NAUSEA: 0

## 2019-05-09 NOTE — PROGRESS NOTES
Cardiology Follow Up Progress Note    Date of Service  5/9/2019    Attending Physician  Lexx Crowley M.D.    Chief Complaint   Preoperative cardiovascular examination, abnormal EKG, profound anemia pending GI studies.    STEWART Bishop is a 84 y.o. female admitted 5/7/2019 with above.    Interim Events  No significant changes noted from cardiac standpoint within the past 24 hours.    Review of Systems  Review of Systems   Constitutional: Negative.  Negative for chills and fever.        Sleepy.   HENT: Negative.  Negative for hearing loss.    Eyes: Negative.    Respiratory: Negative.  Negative for cough and shortness of breath.    Cardiovascular: Negative.  Negative for chest pain, palpitations and leg swelling.   Gastrointestinal: Negative.  Negative for abdominal pain, nausea and vomiting.   Genitourinary: Negative.  Negative for dysuria and urgency.   Musculoskeletal: Negative.  Negative for myalgias.   Skin: Negative.  Negative for rash.   Neurological: Negative.  Negative for dizziness, weakness and headaches.   Hematological: Does not bruise/bleed easily.   Psychiatric/Behavioral: Negative.  The patient is not nervous/anxious.        Vital signs in last 24 hours  Temp:  [36.4 °C (97.5 °F)-37.1 °C (98.7 °F)] 36.4 °C (97.6 °F)  Pulse:  [82-91] 82  Resp:  [17-20] 17  BP: (107-130)/(51-66) 107/51  SpO2:  [91 %-96 %] 96 %    Physical Exam  Physical Exam   Constitutional: She is oriented to person, place, and time. She appears well-developed and well-nourished.   HENT:   Head: Normocephalic and atraumatic.   Eyes: Pupils are equal, round, and reactive to light. Conjunctivae are normal.   Neck: Normal range of motion. Neck supple.   Cardiovascular: Normal rate and regular rhythm.    Pulmonary/Chest: Effort normal and breath sounds normal.   Abdominal: Soft. Bowel sounds are normal.   Musculoskeletal: Normal range of motion. She exhibits no edema.   Neurological: She is alert and oriented to person, place, and time.    Skin: Skin is warm and dry.   Psychiatric: She has a normal mood and affect.       Lab Review  Lab Results   Component Value Date/Time    WBC 11.5 (H) 05/09/2019 04:53 AM    RBC 3.03 (L) 05/09/2019 04:53 AM    HEMOGLOBIN 7.4 (L) 05/09/2019 04:53 AM    HEMATOCRIT 24.4 (L) 05/09/2019 04:53 AM    MCV 80.5 (L) 05/09/2019 04:53 AM    MCH 24.4 (L) 05/09/2019 04:53 AM    MCHC 30.3 (L) 05/09/2019 04:53 AM    MPV 9.3 05/09/2019 04:53 AM      Lab Results   Component Value Date/Time    SODIUM 142 05/09/2019 04:53 AM    POTASSIUM 3.9 05/09/2019 04:53 AM    CHLORIDE 111 05/09/2019 04:53 AM    CO2 19 (L) 05/09/2019 04:53 AM    GLUCOSE 100 (H) 05/09/2019 04:53 AM    BUN 11 05/09/2019 04:53 AM    CREATININE 0.81 05/09/2019 04:53 AM    CREATININE 0.8 04/06/2006 09:05 AM      Lab Results   Component Value Date/Time    ASTSGOT 36 05/07/2019 10:40 AM    ALTSGPT 22 05/07/2019 10:40 AM     Lab Results   Component Value Date/Time    CHOLSTRLTOT 159 11/20/2018 10:14 AM    LDL 44 11/20/2018 10:14 AM    HDL 99 11/20/2018 10:14 AM    TRIGLYCERIDE 82 11/20/2018 10:14 AM    TROPONINI 0.42 (H) 05/09/2019 04:53 AM   (Above labs reviewed.)     Medications reviewed.      Current Facility-Administered Medications:   •  potassium chloride SA (Kdur) tablet 40 mEq, 40 mEq, Oral, BID, Lexx Crowley M.D., 40 mEq at 05/08/19 1434  •  citalopram (CELEXA) tablet 40 mg, 40 mg, Oral, DAILY, Loren Butler M.D., 40 mg at 05/08/19 0516  •  senna-docusate (PERICOLACE or SENOKOT S) 8.6-50 MG per tablet 2 Tab, 2 Tab, Oral, BID, Stopped at 05/08/19 1800 **AND** polyethylene glycol/lytes (MIRALAX) PACKET 1 Packet, 1 Packet, Oral, QDAY PRN **AND** magnesium hydroxide (MILK OF MAGNESIA) suspension 30 mL, 30 mL, Oral, QDAY PRN **AND** bisacodyl (DULCOLAX) suppository 10 mg, 10 mg, Rectal, QDAY PRN, Loren Butler M.D.  •  acetaminophen (TYLENOL) tablet 650 mg, 650 mg, Oral, Q6HRS PRN, Loren Butler M.D.  •  omeprazole (PRILOSEC) capsule 20 mg, 20 mg, Oral, BID, Loren SANCHEZ  JOSÉ Butler, 20 mg at 05/08/19 3844            Cardiac Imaging and Procedures Review  CARDIAC STUDIES/PROCEDURES:      ECHOCARDIOGRAM CONCLUSIONS (05/08/19)  Normal left ventricular systolic function.  Left ventricular ejection fraction is visually estimated to be 60-65%.  Aortic sclerosis without stenosis.  Mild to moderate aortic insufficiency.  Moderately severe mitral regurgitation.  Severely dilated left atrium.  Right heart pressures are consistent with moderate pulmonary hypertension.  (study result reviewed)     ECHOCARDIOGRAM CONCLUSIONS (10/12/18)  Normal left ventricular size, thickness and systolic function.  Mildly dilated left atrium.  Trace aortic insufficiency.  Right ventricular systolic pressure is estimated to be 30 mmHg.  Compared to the images of the prior study done 12/29/2014, aortic   insufficiency is slightly better, and estimated right-sided pressures are lower.     EKG performed on (05/07/19) was reviewed: EKG shows sinus rhythm with anterolateral ST segment depression.     Laboratory examination on 05/08/2019 shows troponin I level of 0.87 ng/mL.    Assessment/Plan  1.  Preoperative cardiovascular evaluation with abnormal EKG and abnormal troponin level:  She is clinically doing well. We will perform a myocardial perfusion imaging study today,  2.  Hypertension:  Blood pressure is well controlled.  3.  History of transient ischemic attack/stroke:  She had no recurrence of stroke-like symptoms.  4.  Gastrointestinal bleed/anemia:  Plan for EGD, colonoscopy in the near future.    Thank you for allowing me to participate in the care of this patient.  I will continue to follow this patient    Please contact me with any questions.    Teofilo Curtis M.D.   Cardiologist, [facility]  [phone]

## 2019-05-09 NOTE — PROGRESS NOTES
Report received from day shift RN, assumed care of pt. Pt resting comfortably in bed on 3L NC, oxygen saturation at 93%, in no apparent distress, unlabored breathing. Pt reports no pain. Plan of care discussed with pt, no questions or needs at this time. Tele box on, rhythm verified. Call light within reach, bed locked and in lowest position, bed alarm on. All fall precautions and hourly rounding in place. Will continue to monitor.

## 2019-05-09 NOTE — PROGRESS NOTES
0730 NC oxygen off as well as continuous pulse ox. Replaced pulse ox- sat 88%. Patient refusing oxygen at this time.

## 2019-05-09 NOTE — PROGRESS NOTES
[facility] Medicine Daily Progress Note    Date of Service  5/9/2019    Chief Complaint  84 y.o. female admitted 5/7/2019 with weakness and SOB     Hospital Course    This is a 85 y/o F with pmhx of anxiety/depression presents to ER on 5/7/19  in with her daughter for increasing weakness. The daughter states that over the past 3 days she has not really been eating well and has been weak overall. In Er pt has been found to have severe anemia with Hg of 3. Pt states was having black stools intermittently over the past 6 months or so.  Her occult stool blood is positive so GI has been consulted, but her troponin is also elevated, and is trending up, so Cardiology has been consulted.        Interval Problem Update  Pt seen and examined, family at bedside, question answered, plan for stress test today as per cards rec.  Cardiology and GI following appreciate rec.   Hemoglobin stable     Consultants/Specialty  GI  Cardiology     Code Status  DNR/DNI    Disposition  TBD     Review of Systems  Review of Systems   Constitutional: Positive for malaise/fatigue. Negative for chills and fever.   HENT: Negative for congestion and ear pain.    Respiratory: Positive for shortness of breath. Negative for cough, sputum production and wheezing.    Cardiovascular: Negative for chest pain and palpitations.   Gastrointestinal: Positive for melena. Negative for nausea and vomiting.   Genitourinary: Negative for dysuria and urgency.   Musculoskeletal: Negative for back pain and myalgias.   Skin: Negative for itching and rash.   Neurological: Positive for weakness. Negative for dizziness and headaches.   All other systems reviewed and are negative.       Physical Exam  Temp:  [36.4 °C (97.5 °F)-37.7 °C (99.9 °F)] 37.7 °C (99.9 °F)  Pulse:  [76-86] 76  Resp:  [17-20] 20  BP: (105-117)/(51-66) 105/55  SpO2:  [91 %-96 %] 95 %    Physical Exam   Constitutional: She is oriented to person, place, and time.   HENT:   Head: Normocephalic and  atraumatic.   Eyes: Conjunctivae are normal. No scleral icterus.   Neck: Neck supple. No JVD present.   Cardiovascular: Normal rate.  Exam reveals no gallop.    Pulmonary/Chest: She has no wheezes. She has no rales.   Abdominal: Soft. Bowel sounds are normal. She exhibits no distension. There is no tenderness.   Musculoskeletal: She exhibits no edema.   Neurological: She is alert and oriented to person, place, and time. No cranial nerve deficit.   Skin: Skin is warm and dry.   Nursing note and vitals reviewed.      Fluids    Intake/Output Summary (Last 24 hours) at 05/09/19 1422  Last data filed at 05/09/19 1239   Gross per 24 hour   Intake              120 ml   Output              380 ml   Net             -260 ml       Laboratory  Recent Labs      05/07/19   1040   05/08/19   0327   05/08/19   2139  05/09/19   0453  05/09/19   0907   WBC  14.2*   --   16.9*   --    --   11.5*   --    RBC  1.82*   --   2.97*   --    --   3.03*   --    HEMOGLOBIN  3.8*   < >  7.1*   < >  7.1*  7.4*  7.4*   HEMATOCRIT  13.6*   < >  23.5*   < >  23.9*  24.4*  24.5*   MCV  74.2*   --   79.1*   --    --   80.5*   --    MCH  20.3*   --   23.9*   --    --   24.4*   --    MCHC  27.4*   --   30.2*   --    --   30.3*   --    RDW  49.6   --   54.3*   --    --   56.9*   --    PLATELETCT  622*   --   550*   --    --   504*   --    MPV  9.6   --   9.4   --    --   9.3   --     < > = values in this interval not displayed.     Recent Labs      05/07/19   1040  05/08/19   0327  05/08/19   1132  05/09/19   0453   SODIUM  142  140   --   142   POTASSIUM  3.8  3.3*  3.3*  3.9   CHLORIDE  112  111   --   111   CO2  16*  19*   --   19*   GLUCOSE  131*  100*   --   100*   BUN  28*  20   --   11   CREATININE  1.16  0.98   --   0.81   CALCIUM  8.4*  8.4*   --   8.6     Recent Labs      05/07/19   1040   INR  1.27*               Imaging  EC-ECHOCARDIOGRAM COMPLETE W/O CONT   Final Result      DX-CHEST-PORTABLE (1 VIEW)   Final Result      1.  Small to  moderate left pleural effusion with associated left basilar atelectasis versus consolidation.   2.  Mild diffuse interstitial edema.   3.  Borderline cardiomegaly.      NM-CARDIAC STRESS TEST    (Results Pending)        Assessment/Plan  * Severe anemia   Assessment & Plan    Patient coming in with a hemoglobin of 3.8 and dyspnea on exertion and generalized weakness.  Acute severe anemia likely secondary to GI bleed black stools for several months intermittently.  She has never had a colonoscopy for Hemoccult is positive here in the ED.  GI has been consulted and plan for EGD/colonoscopy once cleared by cardiology   Has received 2 units of RBC so far  Monitor H/h and transfuse if less then 7      Acute respiratory failure with hypoxia (HCC)   Assessment & Plan    Anemia versus edema versus pneumonia  improving now that anemia is better   cont on ceftriaxone and azithromycin, de-escalate if procalcitonin is negative  Supplemental oxygen continued, wean as tolerated     Occult blood positive stool   Assessment & Plan    No hx of Colonoscopy per patient  Has been having some black stool intermittently over the last 6 months  Monitor h/h [id number]  hgb 3 in ER, has received 2 units of RBC so far.   Hemoglobin now 7.4   GI  Has been consulted but will need cardiology clearance before doing an EGD/colonoscopy  Cardiology has been consulted.   PPI twice daily has been ordered         JOVEL (dyspnea on exertion)   Assessment & Plan    Likely 2/2 acute anemia with hgb of 3.8  Has received 2 units and now her JOVEL is improving   Monitor        Elevated troponin- (present on admission)   Assessment & Plan    No chest pain on admission  Troponin trending up, cardiology following plan for stress test today .   Appreciate rec.        Hypokalemia- (present on admission)   Assessment & Plan    Replete as needed and monitor  Check mag      HTN- (present on admission)   Assessment & Plan    Controlled,however BP running low lately , likely  2/2 acute anemia  IV bolus give in ER, start IVF 100ml/hr  Hold BP meds  Monitor           VTE prophylaxis: scd

## 2019-05-09 NOTE — CARE PLAN
Problem: Safety  Goal: Will remain free from falls  Outcome: PROGRESSING AS EXPECTED  Pt fall risk assessment completed, pt a moderate fall risk. Pt educated on fall program rationale and verbalized understanding. Pt does not call appropriately when needing assistance, re-educated with each encounter. Bed is locked and in lowest position, bed alarm on, non-skid socks in place. All fall precautions and hourly rounding in place.     Problem: Venous Thromboembolism (VTW)/Deep Vein Thrombosis (DVT) Prevention:  Goal: Patient will participate in Venous Thrombosis (VTE)/Deep Vein Thrombosis (DVT)Prevention Measures  Outcome: PROGRESSING AS EXPECTED  Pt educated about VTE/DVT prevention measures and verbalized understanding. Pt has SCDs on

## 2019-05-09 NOTE — PROGRESS NOTES
"Called into room from lab due to patient refusing labs. Pt educated on multiple attempts about the importance of lab draws to evaluate her hemoglobin and to guide her treatment. Pt continued to refuse. SHERRELL Reich, also educated patient about the importance of obtaining labs, pt refusing stating \"won't you just leave me alone.\" Will re-try to draw labs/educate in a few hours.   "

## 2019-05-09 NOTE — PROGRESS NOTES
"0445: bed alarm went off as patient was attempting to get out of bed. Pt does not call appropriately when needing assistance. Pt stated she needed to use the bathroom, asked the patient to fix the oxygen tubing to be placed in her nose. Pt throw the oxygen at this RN and stated \"why don't you just leave me alone, you have me in a jail.\" Educated patient on fall program rational and safety precautions, pt becomes more agitated.   Spoke with Dr. Rosales at 0450 about patient situation and how patient still refusing labs. Per Connie okay to reschedule hemoglobin/routine labs for 0800 along with medications.     0455: patient allowed labs to be drawn. Will reschedule medications for morning.   "

## 2019-05-09 NOTE — CARE PLAN
Problem: Knowledge Deficit  Goal: Knowledge of disease process/condition, treatment plan, diagnostic tests, and medications will improve  Outcome: PROGRESSING SLOWER THAN EXPECTED      Problem: Psychosocial Needs:  Goal: Level of anxiety will decrease  Outcome: PROGRESSING AS EXPECTED

## 2019-05-10 LAB
ANION GAP SERPL CALC-SCNC: 7 MMOL/L (ref 0–11.9)
BUN SERPL-MCNC: 9 MG/DL (ref 8–22)
CALCIUM SERPL-MCNC: 8.4 MG/DL (ref 8.5–10.5)
CHLORIDE SERPL-SCNC: 112 MMOL/L (ref 96–112)
CO2 SERPL-SCNC: 20 MMOL/L (ref 20–33)
CREAT SERPL-MCNC: 0.81 MG/DL (ref 0.5–1.4)
ERYTHROCYTE [DISTWIDTH] IN BLOOD BY AUTOMATED COUNT: 59.8 FL (ref 35.9–50)
GLUCOSE SERPL-MCNC: 82 MG/DL (ref 65–99)
HCT VFR BLD AUTO: 24.3 % (ref 37–47)
HCT VFR BLD AUTO: 25 % (ref 37–47)
HCT VFR BLD AUTO: 25.9 % (ref 37–47)
HCT VFR BLD AUTO: 26.5 % (ref 37–47)
HGB BLD-MCNC: 7.3 G/DL (ref 12–16)
HGB BLD-MCNC: 7.4 G/DL (ref 12–16)
HGB BLD-MCNC: 7.4 G/DL (ref 12–16)
HGB BLD-MCNC: 7.6 G/DL (ref 12–16)
MCH RBC QN AUTO: 24.6 PG (ref 27–33)
MCHC RBC AUTO-ENTMCNC: 30 G/DL (ref 33.6–35)
MCV RBC AUTO: 81.8 FL (ref 81.4–97.8)
MORPHOLOGY BLD-IMP: NORMAL
PLATELET # BLD AUTO: 522 K/UL (ref 164–446)
PMV BLD AUTO: 9.8 FL (ref 9–12.9)
POTASSIUM SERPL-SCNC: 4.3 MMOL/L (ref 3.6–5.5)
RBC # BLD AUTO: 2.97 M/UL (ref 4.2–5.4)
SODIUM SERPL-SCNC: 139 MMOL/L (ref 135–145)
WBC # BLD AUTO: 10.9 K/UL (ref 4.8–10.8)

## 2019-05-10 PROCEDURE — 85027 COMPLETE CBC AUTOMATED: CPT

## 2019-05-10 PROCEDURE — A9270 NON-COVERED ITEM OR SERVICE: HCPCS | Performed by: HOSPITALIST

## 2019-05-10 PROCEDURE — 85018 HEMOGLOBIN: CPT

## 2019-05-10 PROCEDURE — 80048 BASIC METABOLIC PNL TOTAL CA: CPT

## 2019-05-10 PROCEDURE — 700102 HCHG RX REV CODE 250 W/ 637 OVERRIDE(OP): Performed by: INTERNAL MEDICINE

## 2019-05-10 PROCEDURE — 99232 SBSQ HOSP IP/OBS MODERATE 35: CPT | Performed by: INTERNAL MEDICINE

## 2019-05-10 PROCEDURE — 85014 HEMATOCRIT: CPT

## 2019-05-10 PROCEDURE — A9270 NON-COVERED ITEM OR SERVICE: HCPCS | Performed by: INTERNAL MEDICINE

## 2019-05-10 PROCEDURE — 770020 HCHG ROOM/CARE - TELE (206)

## 2019-05-10 PROCEDURE — 700102 HCHG RX REV CODE 250 W/ 637 OVERRIDE(OP): Performed by: HOSPITALIST

## 2019-05-10 PROCEDURE — 36415 COLL VENOUS BLD VENIPUNCTURE: CPT

## 2019-05-10 RX ORDER — PEG-3350, SODIUM SULFATE, SODIUM CHLORIDE, POTASSIUM CHLORIDE, SODIUM ASCORBATE AND ASCORBIC ACID 7.5-2.691G
100 KIT ORAL 2 TIMES DAILY
Status: COMPLETED | OUTPATIENT
Start: 2019-05-10 | End: 2019-05-11

## 2019-05-10 RX ADMIN — CITALOPRAM HYDROBROMIDE 40 MG: 40 TABLET ORAL at 05:10

## 2019-05-10 RX ADMIN — POLYETHYLENE GLYCOL 3350, SODIUM SULFATE, SODIUM CHLORIDE, POTASSIUM CHLORIDE, ASCORBIC ACID, SODIUM ASCORBATE 100 G: KIT at 17:30

## 2019-05-10 RX ADMIN — POTASSIUM CHLORIDE 40 MEQ: 1500 TABLET, EXTENDED RELEASE ORAL at 05:10

## 2019-05-10 RX ADMIN — OMEPRAZOLE 20 MG: 20 CAPSULE, DELAYED RELEASE ORAL at 05:10

## 2019-05-10 RX ADMIN — OMEPRAZOLE 20 MG: 20 CAPSULE, DELAYED RELEASE ORAL at 17:30

## 2019-05-10 ASSESSMENT — ENCOUNTER SYMPTOMS
NEUROLOGICAL NEGATIVE: 1
SHORTNESS OF BREATH: 1
RESPIRATORY NEGATIVE: 1
SPUTUM PRODUCTION: 0
CARDIOVASCULAR NEGATIVE: 1
EYES NEGATIVE: 1
GASTROINTESTINAL NEGATIVE: 1
BRUISES/BLEEDS EASILY: 0
WEAKNESS: 1
HEADACHES: 0
PALPITATIONS: 0
CONSTITUTIONAL NEGATIVE: 1
MYALGIAS: 0
BACK PAIN: 0
NERVOUS/ANXIOUS: 0
SHORTNESS OF BREATH: 0
FEVER: 0
COUGH: 0
WEAKNESS: 0
DIZZINESS: 0
NAUSEA: 0
WHEEZING: 0
PSYCHIATRIC NEGATIVE: 1
MUSCULOSKELETAL NEGATIVE: 1
CHILLS: 0
ABDOMINAL PAIN: 0
VOMITING: 0

## 2019-05-10 NOTE — PROGRESS NOTES
[facility] Medicine Daily Progress Note    Date of Service  5/10/2019    Chief Complaint  84 y.o. female admitted 5/7/2019 with weakness and SOB     Hospital Course    This is a 83 y/o F with pmhx of anxiety/depression presents to ER on 5/7/19  in with her daughter for increasing weakness. The daughter states that over the past 3 days she has not really been eating well and has been weak overall. In Er pt has been found to have severe anemia with Hg of 3. Pt states was having black stools intermittently over the past 6 months or so.  Her occult stool blood is positive so GI has been consulted, but her troponin is also elevated, and is trending up, so Cardiology has been consulted.        Interval Problem Update  No overnight events, afebrile, her stress test was done yesterday and is negative, cardiology signed off. GI following and plan for EGD/colonoscopy tomorrow.   Denies any CP, SOB nausea or vomiting.    Consultants/Specialty  GI  Cardiology     Code Status  DNR/DNI    Disposition  TBD     Review of Systems  Review of Systems   Constitutional: Positive for malaise/fatigue. Negative for chills and fever.   HENT: Negative for congestion and ear pain.    Respiratory: Positive for shortness of breath. Negative for cough, sputum production and wheezing.    Cardiovascular: Negative for chest pain and palpitations.   Gastrointestinal: Positive for melena. Negative for nausea and vomiting.   Genitourinary: Negative for dysuria and urgency.   Musculoskeletal: Negative for back pain and myalgias.   Skin: Negative for itching and rash.   Neurological: Positive for weakness. Negative for dizziness and headaches.   All other systems reviewed and are negative.       Physical Exam  Temp:  [36.4 °C (97.5 °F)-37.5 °C (99.5 °F)] 37.5 °C (99.5 °F)  Pulse:  [66-84] 71  Resp:  [14-18] 18  BP: ()/(52-69) 120/52  SpO2:  [93 %-98 %] 95 %    Physical Exam   Constitutional: She is oriented to person, place, and time.   HENT:    Head: Normocephalic and atraumatic.   Eyes: Conjunctivae are normal. No scleral icterus.   Neck: Neck supple. No JVD present.   Cardiovascular: Normal rate.  Exam reveals no gallop.    Pulmonary/Chest: She has no wheezes. She has no rales.   Abdominal: Soft. Bowel sounds are normal. She exhibits no distension. There is no tenderness.   Musculoskeletal: She exhibits no edema.   Neurological: She is alert and oriented to person, place, and time. No cranial nerve deficit.   Skin: Skin is warm and dry.   Nursing note and vitals reviewed.      Fluids    Intake/Output Summary (Last 24 hours) at 05/10/19 1338  Last data filed at 05/10/19 0800   Gross per 24 hour   Intake              360 ml   Output                0 ml   Net              360 ml       Laboratory  Recent Labs      05/08/19   0327 05/09/19 0453   05/09/19   2139  05/10/19   0320  05/10/19   0930   WBC  16.9*   --   11.5*   --    --   10.9*   --    RBC  2.97*   --   3.03*   --    --   2.97*   --    HEMOGLOBIN  7.1*   < >  7.4*   < >  7.1*  7.3*  7.6*   HEMATOCRIT  23.5*   < >  24.4*   < >  23.6*  24.3*  26.5*   MCV  79.1*   --   80.5*   --    --   81.8   --    MCH  23.9*   --   24.4*   --    --   24.6*   --    MCHC  30.2*   --   30.3*   --    --   30.0*   --    RDW  54.3*   --   56.9*   --    --   59.8*   --    PLATELETCT  550*   --   504*   --    --   522*   --    MPV  9.4   --   9.3   --    --   9.8   --     < > = values in this interval not displayed.     Recent Labs      05/08/19   0327 05/08/19   1132  05/09/19   0453  05/10/19   0320   SODIUM  140   --   142  139   POTASSIUM  3.3*  3.3*  3.9  4.3   CHLORIDE  111   --   111  112   CO2  19*   --   19*  20   GLUCOSE  100*   --   100*  82   BUN  20   --   11  9   CREATININE  0.98   --   0.81  0.81   CALCIUM  8.4*   --   8.6  8.4*                   Imaging  NM-CARDIAC STRESS TEST   Final Result      EC-ECHOCARDIOGRAM COMPLETE W/O CONT   Final Result      DX-CHEST-PORTABLE (1 VIEW)   Final Result       1.  Small to moderate left pleural effusion with associated left basilar atelectasis versus consolidation.   2.  Mild diffuse interstitial edema.   3.  Borderline cardiomegaly.           Assessment/Plan  * Severe anemia   Assessment & Plan    Patient coming in with a hemoglobin of 3.8 and dyspnea on exertion and generalized weakness.  Acute severe anemia likely secondary to GI bleed black stools for several months intermittently.  She has never had a colonoscopy for Hemoccult is positive here in the ED.    GI following plan for EGD and colonoscopy tomorrow now that pt has been cleared by cardiology   Monitor H/h and transfuse if less then 7      Acute respiratory failure with hypoxia (HCC)   Assessment & Plan    Anemia versus edema versus pneumonia  improving now that anemia is better   cont on ceftriaxone and azithromycin, de-escalate if procalcitonin is negative  Supplemental oxygen continued, wean as tolerated     Occult blood positive stool   Assessment & Plan    No hx of Colonoscopy per patient  Has been having some black stool intermittently over the last 6 months  Monitor h/h [id number]  hgb 3 in ER, has received 2 units of RBC so far.   Hemoglobin now 7.4   Pt has been cleared by cardiology, and GI is following, plan for EGD/Colonoscopy tomorrow   appreciate rec.        JOVEL (dyspnea on exertion)   Assessment & Plan    Likely 2/2 acute anemia with hgb of 3.8  Has received 2 units and now her JOVEL is improving   Monitor        Elevated troponin- (present on admission)   Assessment & Plan    No chest pain on admission  Had a Stress test done which was negative, cardiology has signed off.        Hypokalemia- (present on admission)   Assessment & Plan    Replete as needed and monitor  Check mag      HTN- (present on admission)   Assessment & Plan    Controlled,however BP running low lately , likely 2/2 acute anemia  IV bolus give in ER, start IVF 100ml/hr  Hold BP meds  Monitor           VTE prophylaxis: scd

## 2019-05-10 NOTE — CARE PLAN
Problem: Nutritional:  Goal: Achieve adequate nutritional intake  Patient will consume 50% of meals   Outcome: PROGRESSING SLOWER THAN EXPECTED    Intervention: Advance diet as tolerated  Pt remains on clear liquid diet (day 2). Was only briefly on full liquids.   Reviewed GI note for today, pt will remain on clear liquids today and will be made NPO in the morning for EGD.     RD following for diet advancement with adequate intake.

## 2019-05-10 NOTE — PROGRESS NOTES
"  Gastroenterology Progress Note     Author: Anastacia Smith   Date & Time Created: 5/10/2019 9:06 AM    Reason for Consult:      Severe anemia with occult blood in the stool     History of Present Illness:    Rebecca is an 84 year old woman who is  and lives with her daughter.  She was brought in today for progressive weakness and dyspnea with exertion over the past several weeks.  She was found to be markedly anemic and had dark brown stool positive for occult blood.  She states that periodically, over several years, she will note that her stool looks dark, but has never seen any fresh red blood in the stool.  She admits to recent decrease in appetite and mild post-prandial nausea.  She denies any heartburn, dysphagia, vomiting or change in bowel habits.  She does occasionally take over the counter \"pain pills\" like ibuprofen.    Course:  5/8/2018:  Hgb zoë from 3.8 to 7.4 with 2 units, now 7.1.  She passed a large brown stool last night.  Her bowel sounds are very loud, but she denies pain, distention, nausea and vomiting.  Requiring supplemental oxygen.  Troponin-I zoë to 0.87 and cardiology will be seeing today.  5/9/18:  Had Nuc Med stress test today, brown stool last night  5/10/19: Denies any dark or bloody stools, denies abd pain      Review of Systems:  Review of Systems   Respiratory: Positive for shortness of breath (Mild with exertion.). Negative for cough and wheezing.    Cardiovascular: Negative for chest pain and palpitations.   Gastrointestinal: Negative for abdominal pain, melena, nausea and vomiting.       Physical Exam:  Physical Exam   Constitutional: She is oriented to person, place, and time. No distress.   Cardiovascular: Regular rhythm.    No murmur heard.  Pulmonary/Chest: She has no wheezes. She has no rales.   Abdominal: Soft. She exhibits no distension and no mass. There is no tenderness.   Neurological: She is alert and oriented to person, place, and time.   Skin: Skin is warm " and dry.   Psychiatric: She has a normal mood and affect.       Labs:      Recent Labs      05/07/19   2150  05/08/19   0732  05/09/19 0453   TROPONINI  0.47*  0.87*  0.42*     Recent Labs      05/08/19   0327 05/08/19   1132  05/09/19   0453  05/10/19   0320   SODIUM  140   --   142  139   POTASSIUM  3.3*  3.3*  3.9  4.3   CHLORIDE  111   --   111  112   CO2  19*   --   19*  20   BUN  20   --   11  9   CREATININE  0.98   --   0.81  0.81   MAGNESIUM   --    --   1.9   --    CALCIUM  8.4*   --   8.6  8.4*     Recent Labs      05/07/19   1040  05/08/19   0327  05/09/19   0453  05/10/19   0320   ALTSGPT  22   --    --    --    ASTSGOT  36   --    --    --    ALKPHOSPHAT  70   --    --    --    TBILIRUBIN  0.6   --    --    --    GLUCOSE  131*  100*  100*  82     Recent Labs      05/07/19   1040   05/08/19   0327 05/09/19 0453 05/09/19   1606  05/09/19   2139  05/10/19   0320   RBC  1.82*   --   2.97*   --   3.03*   --    --    --   2.97*   HEMOGLOBIN  3.8*   < >  7.1*   < >  7.4*   < >  8.0*  7.1*  7.3*   HEMATOCRIT  13.6*   < >  23.5*   < >  24.4*   < >  25.8*  23.6*  24.3*   PLATELETCT  622*   --   550*   --   504*   --    --    --   522*   PROTHROMBTM  16.0*   --    --    --    --    --    --    --    --    INR  1.27*   --    --    --    --    --    --    --    --    IRON  <10*   --    --    --    --    --    --    --    --    FERRITIN  5.0*   --    --    --    --    --    --    --    --    TOTIRONBC  428   --    --    --    --    --    --    --    --     < > = values in this interval not displayed.     Recent Labs      05/07/19   1040  05/08/19   0327  05/09/19   0453  05/10/19   0320   WBC  14.2*  16.9*  11.5*  10.9*   NEUTSPOLYS  84.80*  80.20*   --    --    LYMPHOCYTES  8.00*  9.20*   --    --    MONOCYTES  6.10  8.40   --    --    EOSINOPHILS  0.00  0.60   --    --    BASOPHILS  0.30  0.80   --    --    ASTSGOT  36   --    --    --    ALTSGPT  22   --    --    --    ALKPHOSPHAT  70   --    --    --     TBILIRUBIN  0.6   --    --    --      Hemodynamics:  Temp (24hrs), Av °C (98.6 °F), Min:36.4 °C (97.5 °F), Max:37.7 °C (99.9 °F)  Temperature: 37.4 °C (99.3 °F)  Pulse  Av  Min: 66  Max: 93   Blood Pressure : 115/68     Respiratory:    Respiration: 18, Pulse Oximetry: 95 %     Work Of Breathing / Effort: Mild  RUL Breath Sounds: Clear, RML Breath Sounds: Diminished, RLL Breath Sounds: Diminished, INGRID Breath Sounds: Clear, LLL Breath Sounds: Diminished  Fluids:    Intake/Output Summary (Last 24 hours) at 19 0854  Last data filed at 19 0523   Gross per 24 hour   Intake             1002 ml   Output              250 ml   Net              752 ml     Weight: 46.1 kg (101 lb 10.1 oz)  GI/Nutrition:  Orders Placed This Encounter   Procedures   • Diet Order Clear Liquids - No Red Foods     Standing Status:   Standing     Number of Occurrences:   1     Order Specific Question:   Diet:     Answer:   Clear Liquids - No Red Foods [12]   • Diet NPO     Standing Status:   Standing     Number of Occurrences:   1     Order Specific Question:   Restrict to:     Answer:   Sips with Medications [3]     Medical Decision Making, by Problem:  Active Hospital Problems    Diagnosis   • *Severe anemia [D64.9]   • Occult blood positive stool [R19.5]   • Acute respiratory failure with hypoxia (HCC) [J96.01]   • JOVEL (dyspnea on exertion) [R06.09]   • Elevated troponin [R74.8]   • HTN [I10]       IMPRESSIONS:  1.  Severe iron deficiency anemia due to intermittent longstanding blood loss.  In safe range after transfusions.  2.   Periodic dark stools c/w GI bleeding.  Presently brown, but positive for occult blood.  3.   Thrombocytosis due to iron deficiency state.  4.   Dyspnea with exertion.  She probably has some high output CHF present.  5.  History of TIA.  6.  History of fall with right hip fracture requiring pinning 10/2018.     RECOMMENDATIONS:  Cl liq diet today  Start Moviprep tonight and second dose early am  Will  schedule EGD/colon  for tomorrow am    Quality-Core Measures

## 2019-05-10 NOTE — PROGRESS NOTES
Report received from day shift RN, assumed care of pt. Pt A&O3, disoriented to situation, resting comfortably in bed, in no apparent distress, no reports pain. Plan of care discussed with pt, no questions or needs at this time. Tele box on, rhythm verified. Pt currently on 1L NC with oxygen saturation at 94%. Call light within reach, bed locked and in lowest position, bed alarm on. Pt educated about calling for assistance before getting out of bed and verbalized understanding. All fall precautions and hourly rounding in place. Will continue to monitor.

## 2019-05-10 NOTE — PROGRESS NOTES
"  Gastroenterology Progress Note     Author: Anastacia Smith   Date & Time Created: 5/9/2019 7:56 PM    Reason for Consult:      Severe anemia with occult blood in the stool     History of Present Illness:    Rebecca is an 84 year old woman who is  and lives with her daughter.  She was brought in today for progressive weakness and dyspnea with exertion over the past several weeks.  She was found to be markedly anemic and had dark brown stool positive for occult blood.  She states that periodically, over several years, she will note that her stool looks dark, but has never seen any fresh red blood in the stool.  She admits to recent decrease in appetite and mild post-prandial nausea.  She denies any heartburn, dysphagia, vomiting or change in bowel habits.  She does occasionally take over the counter \"pain pills\" like ibuprofen.    Course:  5/8/2018:  Hgb zoë from 3.8 to 7.4 with 2 units, now 7.1.  She passed a large brown stool last night.  Her bowel sounds are very loud, but she denies pain, distention, nausea and vomiting.  Requiring supplemental oxygen.  Troponin-I zoë to 0.87 and cardiology will be seeing today.  5/9/18:  Had Nuc Med stress test today, brown stool last night      Review of Systems:  Review of Systems   Respiratory: Positive for shortness of breath (Mild with exertion.). Negative for cough and wheezing.    Cardiovascular: Negative for chest pain and palpitations.   Gastrointestinal: Negative for abdominal pain, melena, nausea and vomiting.       Physical Exam:  Physical Exam   Constitutional: She is oriented to person, place, and time. No distress.   Pale appearing elderly woman in good spirits.   Cardiovascular: Regular rhythm.    No murmur heard.  Pulmonary/Chest: She has no wheezes. She has no rales.   Decreased breath sounds at bases bilaterally.   Abdominal: Soft. She exhibits no distension and no mass. There is no tenderness.   Neurological: She is alert and oriented to person, " place, and time.       Labs:      Recent Labs      19   2150  19   0732  19   TROPONINI  0.47*  0.87*  0.42*     Recent Labs      19   10419   1132  19   SODIUM  142  140   --   142   POTASSIUM  3.8  3.3*  3.3*  3.9   CHLORIDE  112  111   --   111   CO2  16*  19*   --   19*   BUN  28*  20   --   11   CREATININE  1.16  0.98   --   0.81   MAGNESIUM   --    --    --   1.9   CALCIUM  8.4*  8.4*   --   8.6     Recent Labs      19   10419   ALTSGPT  22   --    --    ASTSGOT  36   --    --    ALKPHOSPHAT  70   --    --    TBILIRUBIN  0.6   --    --    GLUCOSE  131*  100*  100*     Recent Labs      19   0919   1606   RBC  1.82*   --   2.97*   --   3.03*   --    --    HEMOGLOBIN  3.8*   < >  7.1*   < >  7.4*  7.4*  8.0*   HEMATOCRIT  13.6*   < >  23.5*   < >  24.4*  24.5*  25.8*   PLATELETCT  622*   --   550*   --   504*   --    --    PROTHROMBTM  16.0*   --    --    --    --    --    --    INR  1.27*   --    --    --    --    --    --    IRON  <10*   --    --    --    --    --    --    FERRITIN  5.0*   --    --    --    --    --    --    TOTIRONBC  428   --    --    --    --    --    --     < > = values in this interval not displayed.     Recent Labs      19   WBC  14.2*  16.9*  11.5*   NEUTSPOLYS  84.80*  80.20*   --    LYMPHOCYTES  8.00*  9.20*   --    MONOCYTES  6.10  8.40   --    EOSINOPHILS  0.00  0.60   --    BASOPHILS  0.30  0.80   --    ASTSGOT  36   --    --    ALTSGPT  22   --    --    ALKPHOSPHAT  70   --    --    TBILIRUBIN  0.6   --    --      Hemodynamics:  Temp (24hrs), Av.9 °C (98.5 °F), Min:36.4 °C (97.6 °F), Max:37.7 °C (99.9 °F)  Temperature: 36.6 °C (97.8 °F)  Pulse  Av.2  Min: 76  Max: 93   Blood Pressure : 102/57     Respiratory:    Respiration: 14, Pulse Oximetry: 97 %      Work Of Breathing / Effort: Mild  RUL Breath Sounds: Diminished, RML Breath Sounds: Diminished, RLL Breath Sounds: Diminished, INGRID Breath Sounds: Diminished, LLL Breath Sounds: Diminished  Fluids:    Intake/Output Summary (Last 24 hours) at 05/08/19 0854  Last data filed at 05/08/19 0523   Gross per 24 hour   Intake             1002 ml   Output              250 ml   Net              752 ml        GI/Nutrition:  Orders Placed This Encounter   Procedures   • Diet Order Clear Liquids - No Red Foods     Standing Status:   Standing     Number of Occurrences:   1     Order Specific Question:   Diet:     Answer:   Clear Liquids - No Red Foods [12]     Medical Decision Making, by Problem:  Active Hospital Problems    Diagnosis   • *Severe anemia [D64.9]   • Occult blood positive stool [R19.5]   • Acute respiratory failure with hypoxia (HCC) [J96.01]   • JOVEL (dyspnea on exertion) [R06.09]   • Elevated troponin [R74.8]   • HTN [I10]       IMPRESSIONS:  #  Severe iron deficiency anemia due to intermittent longstanding blood loss.  In safe range after transfusions.  #  Periodic dark stools c/w GI bleeding.  Presently brown, but positive for occult blood.  #  Thrombocytosis due to iron deficiency state.  #  Dyspnea with exertion.  She probably has some high output CHF present.  #  Stress leukemoid reaction.  #  Hyperglycemia, stress induced, improved.  #  Elevated troponin-I.  Cardiology will be assessing today.  #  History of TIA.  #  History of fall with right hip fracture requiring pinning 10/2018.     RECOMMENDATIONS:  - Continue omeprazole 20 mg BID.  - IV iron replacement therapy to expedite replenishment.  - Full liquid diet.  - Most likely will start colon prep tomorrow and EGD/colon Sat am as stress test appears negative.       Quality-Core Measures

## 2019-05-10 NOTE — CARE PLAN
Problem: Safety  Goal: Will remain free from injury  Outcome: PROGRESSING AS EXPECTED  Pt educated on calling for assistance before getting out of bed and verbalized understanding. Bed is locked and in lowest position, bed alarm on, non-skid socks in place. All fall precautions and hourly rounding in place.     Problem: Infection  Goal: Will remain free from infection  Outcome: PROGRESSING AS EXPECTED  Hand hygiene completed before and after patient care. Pt educated about infection prevention and verbalized understanding. RN follows all protocols for infection prevention.

## 2019-05-11 LAB
ANION GAP SERPL CALC-SCNC: 9 MMOL/L (ref 0–11.9)
BUN SERPL-MCNC: 9 MG/DL (ref 8–22)
CALCIUM SERPL-MCNC: 8.4 MG/DL (ref 8.5–10.5)
CHLORIDE SERPL-SCNC: 115 MMOL/L (ref 96–112)
CO2 SERPL-SCNC: 18 MMOL/L (ref 20–33)
CREAT SERPL-MCNC: 0.83 MG/DL (ref 0.5–1.4)
ERYTHROCYTE [DISTWIDTH] IN BLOOD BY AUTOMATED COUNT: 64.8 FL (ref 35.9–50)
GLUCOSE SERPL-MCNC: 86 MG/DL (ref 65–99)
HCT VFR BLD AUTO: 24.7 % (ref 37–47)
HCT VFR BLD AUTO: 25.5 % (ref 37–47)
HCT VFR BLD AUTO: 26.2 % (ref 37–47)
HCT VFR BLD AUTO: 27.8 % (ref 37–47)
HGB BLD-MCNC: 7.3 G/DL (ref 12–16)
HGB BLD-MCNC: 7.5 G/DL (ref 12–16)
HGB BLD-MCNC: 7.8 G/DL (ref 12–16)
HGB BLD-MCNC: 8.1 G/DL (ref 12–16)
MCH RBC QN AUTO: 24.5 PG (ref 27–33)
MCHC RBC AUTO-ENTMCNC: 29.6 G/DL (ref 33.6–35)
MCV RBC AUTO: 82.9 FL (ref 81.4–97.8)
MORPHOLOGY BLD-IMP: NORMAL
MORPHOLOGY BLD-IMP: NORMAL
PLATELET # BLD AUTO: 517 K/UL (ref 164–446)
PMV BLD AUTO: 9.5 FL (ref 9–12.9)
POTASSIUM SERPL-SCNC: 3.9 MMOL/L (ref 3.6–5.5)
RBC # BLD AUTO: 2.98 M/UL (ref 4.2–5.4)
SODIUM SERPL-SCNC: 142 MMOL/L (ref 135–145)
WBC # BLD AUTO: 7.6 K/UL (ref 4.8–10.8)

## 2019-05-11 PROCEDURE — 36415 COLL VENOUS BLD VENIPUNCTURE: CPT

## 2019-05-11 PROCEDURE — 85027 COMPLETE CBC AUTOMATED: CPT

## 2019-05-11 PROCEDURE — 80048 BASIC METABOLIC PNL TOTAL CA: CPT

## 2019-05-11 PROCEDURE — 700102 HCHG RX REV CODE 250 W/ 637 OVERRIDE(OP): Performed by: INTERNAL MEDICINE

## 2019-05-11 PROCEDURE — 99232 SBSQ HOSP IP/OBS MODERATE 35: CPT | Performed by: INTERNAL MEDICINE

## 2019-05-11 PROCEDURE — A9270 NON-COVERED ITEM OR SERVICE: HCPCS | Performed by: INTERNAL MEDICINE

## 2019-05-11 PROCEDURE — A9270 NON-COVERED ITEM OR SERVICE: HCPCS | Performed by: HOSPITALIST

## 2019-05-11 PROCEDURE — 770020 HCHG ROOM/CARE - TELE (206)

## 2019-05-11 PROCEDURE — 85014 HEMATOCRIT: CPT

## 2019-05-11 PROCEDURE — 700101 HCHG RX REV CODE 250: Performed by: INTERNAL MEDICINE

## 2019-05-11 PROCEDURE — 85018 HEMOGLOBIN: CPT

## 2019-05-11 PROCEDURE — 700102 HCHG RX REV CODE 250 W/ 637 OVERRIDE(OP): Performed by: HOSPITALIST

## 2019-05-11 RX ADMIN — OMEPRAZOLE 20 MG: 20 CAPSULE, DELAYED RELEASE ORAL at 17:20

## 2019-05-11 RX ADMIN — SENNOSIDES, DOCUSATE SODIUM 2 TABLET: 50; 8.6 TABLET, FILM COATED ORAL at 05:01

## 2019-05-11 RX ADMIN — CITALOPRAM HYDROBROMIDE 40 MG: 40 TABLET ORAL at 05:02

## 2019-05-11 RX ADMIN — POLYETHYLENE GLYCOL 3350, SODIUM SULFATE, SODIUM CHLORIDE, POTASSIUM CHLORIDE, ASCORBIC ACID, SODIUM ASCORBATE 100 G: KIT at 04:17

## 2019-05-11 RX ADMIN — POLYETHYLENE GLYCOL 3350, SODIUM SULFATE ANHYDROUS, SODIUM BICARBONATE, SODIUM CHLORIDE, POTASSIUM CHLORIDE 2 L: 236; 22.74; 6.74; 5.86; 2.97 POWDER, FOR SOLUTION ORAL at 17:20

## 2019-05-11 RX ADMIN — OMEPRAZOLE 20 MG: 20 CAPSULE, DELAYED RELEASE ORAL at 05:01

## 2019-05-11 ASSESSMENT — ENCOUNTER SYMPTOMS
FEVER: 0
SHORTNESS OF BREATH: 1
ABDOMINAL PAIN: 0
COUGH: 0
BACK PAIN: 0
HEADACHES: 0
DIZZINESS: 0
WHEEZING: 0
PALPITATIONS: 0
VOMITING: 0
CHILLS: 0
MYALGIAS: 0
SPUTUM PRODUCTION: 0
SHORTNESS OF BREATH: 0
NAUSEA: 0
WEAKNESS: 1

## 2019-05-11 NOTE — PROGRESS NOTES
"  Gastroenterology Progress Note     Author: Anastacia Smith   Date & Time Created: 5/11/2019 8:24 AM    Reason for Consult:      Severe anemia with occult blood in the stool     History of Present Illness:    Rebecca is an 84 year old woman who is  and lives with her daughter.  She was brought in today for progressive weakness and dyspnea with exertion over the past several weeks.  She was found to be markedly anemic and had dark brown stool positive for occult blood.  She states that periodically, over several years, she will note that her stool looks dark, but has never seen any fresh red blood in the stool.  She admits to recent decrease in appetite and mild post-prandial nausea.  She denies any heartburn, dysphagia, vomiting or change in bowel habits.  She does occasionally take over the counter \"pain pills\" like ibuprofen.    Course:  5/8/2018:  Hgb zoë from 3.8 to 7.4 with 2 units, now 7.1.  She passed a large brown stool last night.  Her bowel sounds are very loud, but she denies pain, distention, nausea and vomiting.  Requiring supplemental oxygen.  Troponin-I zoë to 0.87 and cardiology will be seeing today.  5/9/18:  Had Nuc Med stress test today, brown stool last night  5/10/19: Denies any dark or bloody stools, denies abd pain  5/11/19:  Endo cancelled due to inadequate colon prep      Review of Systems:  Review of Systems   Respiratory: Negative for cough, shortness of breath and wheezing.    Cardiovascular: Negative for chest pain and palpitations.   Gastrointestinal: Negative for abdominal pain, melena, nausea and vomiting.       Physical Exam:  Physical Exam   Constitutional: She is oriented to person, place, and time. No distress.   Cardiovascular: Regular rhythm.    No murmur heard.  Pulmonary/Chest: She has no wheezes. She has no rales.   Abdominal: Soft. She exhibits no distension and no mass. There is no tenderness.   Neurological: She is alert and oriented to person, place, and time. "   Skin: Skin is warm and dry.   Psychiatric: She has a normal mood and affect.       Labs:      Recent Labs      19   TROPONINI  0.42*     Recent Labs      05/09/19   0453  05/10/19   0320  05/11/19   0317   SODIUM  142  139  142   POTASSIUM  3.9  4.3  3.9   CHLORIDE  111  112  115*   CO2  19*  20  18*   BUN  11  9  9   CREATININE  0.81  0.81  0.83   MAGNESIUM  1.9   --    --    CALCIUM  8.6  8.4*  8.4*     Recent Labs      05/09/19   0453  05/10/19   0320  05/11/19   0317   GLUCOSE  100*  82  86     Recent Labs      05/09/19   0453   05/10/19   0320   05/10/19   1537  05/10/19   2143  19   RBC  3.03*   --   2.97*   --    --    --   2.98*   HEMOGLOBIN  7.4*   < >  7.3*   < >  7.4*  7.4*  7.3*   HEMATOCRIT  24.4*   < >  24.3*   < >  25.0*  25.9*  24.7*   PLATELETCT  504*   --   522*   --    --    --   517*    < > = values in this interval not displayed.     Recent Labs      05/09/19   0453  05/10/19   0320  05/11/19   0317   WBC  11.5*  10.9*  7.6     Hemodynamics:  Temp (24hrs), Av.9 °C (98.5 °F), Min:36.6 °C (97.8 °F), Max:37.5 °C (99.5 °F)  Temperature: 36.8 °C (98.2 °F)  Pulse  Av.4  Min: 66  Max: 93   Blood Pressure : 129/63     Respiratory:    Respiration: 20, Pulse Oximetry: 92 %     Work Of Breathing / Effort: Mild  RUL Breath Sounds: Diminished;Inspiratory Wheezes, RML Breath Sounds: Diminished;Inspiratory Wheezes, RLL Breath Sounds: Diminished;Inspiratory Wheezes, INGRID Breath Sounds: Diminished;Inspiratory Wheezes, LLL Breath Sounds: Diminished;Inspiratory Wheezes  Fluids:    Intake/Output Summary (Last 24 hours) at 19 0854  Last data filed at 19 0523   Gross per 24 hour   Intake             1002 ml   Output              250 ml   Net              752 ml     Weight: 45.3 kg (99 lb 13.9 oz)  GI/Nutrition:  Orders Placed This Encounter   Procedures   • Diet NPO     Standing Status:   Standing     Number of Occurrences:   1     Order Specific Question:   Restrict  to:     Answer:   Sips with Medications [3]     Medical Decision Making, by Problem:  Active Hospital Problems    Diagnosis   • *Severe anemia [D64.9]   • Occult blood positive stool [R19.5]   • Acute respiratory failure with hypoxia (HCC) [J96.01]   • JOVEL (dyspnea on exertion) [R06.09]   • Elevated troponin [R74.8]   • HTN [I10]       IMPRESSIONS:  1.  Severe iron deficiency anemia due to intermittent longstanding blood loss.  In safe range after transfusions.  2.   Periodic dark stools c/w GI bleeding.  Presently brown, but positive for occult blood.  3.   Thrombocytosis due to iron deficiency state.  4.   Dyspnea with exertion.   5.  Elevated troponin, stress test negative   6.  History of TIA.  7.  History of fall with right hip fracture requiring pinning 10/2018.     RECOMMENDATIONS:  Cl liq diet today  Reprep with 1 dose today  Schedule EGD/colon  for tomorrow am    Quality-Core Measures

## 2019-05-11 NOTE — PROGRESS NOTES
Received report from night shift nurse. Tele box on. Updated patient about plan of care. All questions answered. Pt complained of 0 out of 10 pain. A&Ox3. Currently taking bowel prep for procedure tomorrow. Disoriented to time. Bed locked and in lowest position. Call light in reach. Will continue to monitor.

## 2019-05-11 NOTE — PROGRESS NOTES
[facility] Medicine Daily Progress Note    Date of Service  5/11/2019    Chief Complaint  84 y.o. female admitted 5/7/2019 with weakness and SOB     Hospital Course    This is a 83 y/o F with pmhx of anxiety/depression presents to ER on 5/7/19  in with her daughter for increasing weakness. The daughter states that over the past 3 days she has not really been eating well and has been weak overall. In Er pt has been found to have severe anemia with Hg of 3. Pt states was having black stools intermittently over the past 6 months or so.  Her occult stool blood is positive so GI has been consulted, but her troponin is also elevated, and is trending up, so Cardiology has been consulted.        Interval Problem Update  No overnight events, afebrile,GI following and plan for EGD/colonoscopy tomorrow 5/12  Denies any CP, SOB nausea or vomiting.    Consultants/Specialty  GI  Cardiology     Code Status  DNR/DNI    Disposition  TBD     Review of Systems  Review of Systems   Constitutional: Positive for malaise/fatigue. Negative for chills and fever.   HENT: Negative for congestion and ear pain.    Respiratory: Positive for shortness of breath. Negative for cough, sputum production and wheezing.    Cardiovascular: Negative for chest pain and palpitations.   Gastrointestinal: Positive for melena. Negative for nausea and vomiting.   Genitourinary: Negative for dysuria and urgency.   Musculoskeletal: Negative for back pain and myalgias.   Skin: Negative for itching and rash.   Neurological: Positive for weakness. Negative for dizziness and headaches.   All other systems reviewed and are negative.       Physical Exam  Temp:  [36.6 °C (97.8 °F)-37.3 °C (99.2 °F)] 36.8 °C (98.3 °F)  Pulse:  [73-78] 78  Resp:  [18-20] 18  BP: (105-133)/(57-63) 120/59  SpO2:  [90 %-94 %] 92 %    Physical Exam   Constitutional: She is oriented to person, place, and time.   HENT:   Head: Normocephalic and atraumatic.   Eyes: Conjunctivae are normal. No  scleral icterus.   Neck: Neck supple. No JVD present.   Cardiovascular: Normal rate.  Exam reveals no gallop.    Pulmonary/Chest: She has no wheezes. She has no rales.   Abdominal: Soft. Bowel sounds are normal. She exhibits no distension. There is no tenderness.   Musculoskeletal: She exhibits no edema.   Neurological: She is alert and oriented to person, place, and time. No cranial nerve deficit.   Skin: Skin is warm and dry.   Nursing note and vitals reviewed.      Fluids    Intake/Output Summary (Last 24 hours) at 05/11/19 1346  Last data filed at 05/10/19 1700   Gross per 24 hour   Intake              120 ml   Output                0 ml   Net              120 ml       Laboratory  Recent Labs      05/09/19   0453   05/10/19   0320   05/10/19   2143  05/11/19 0317 05/11/19 0918   WBC  11.5*   --   10.9*   --    --   7.6   --    RBC  3.03*   --   2.97*   --    --   2.98*   --    HEMOGLOBIN  7.4*   < >  7.3*   < >  7.4*  7.3*  8.1*   HEMATOCRIT  24.4*   < >  24.3*   < >  25.9*  24.7*  27.8*   MCV  80.5*   --   81.8   --    --   82.9   --    MCH  24.4*   --   24.6*   --    --   24.5*   --    MCHC  30.3*   --   30.0*   --    --   29.6*   --    RDW  56.9*   --   59.8*   --    --   64.8*   --    PLATELETCT  504*   --   522*   --    --   517*   --    MPV  9.3   --   9.8   --    --   9.5   --     < > = values in this interval not displayed.     Recent Labs      05/09/19   0453  05/10/19   0320  05/11/19   0317   SODIUM  142  139  142   POTASSIUM  3.9  4.3  3.9   CHLORIDE  111  112  115*   CO2  19*  20  18*   GLUCOSE  100*  82  86   BUN  11  9  9   CREATININE  0.81  0.81  0.83   CALCIUM  8.6  8.4*  8.4*                   Imaging  NM-CARDIAC STRESS TEST   Final Result      EC-ECHOCARDIOGRAM COMPLETE W/O CONT   Final Result      DX-CHEST-PORTABLE (1 VIEW)   Final Result      1.  Small to moderate left pleural effusion with associated left basilar atelectasis versus consolidation.   2.  Mild diffuse interstitial edema.    3.  Borderline cardiomegaly.           Assessment/Plan  * Severe anemia   Assessment & Plan    Patient coming in with a hemoglobin of 3.8 and dyspnea on exertion and generalized weakness.  Acute severe anemia likely secondary to GI bleed black stools for several months intermittently.  She has never had a colonoscopy for Hemoccult is positive here in the ED.    GI following plan for EGD and colonoscopy tomorrow now that pt has been cleared by cardiology   Monitor H/h and transfuse if less then 7      Acute respiratory failure with hypoxia (HCC)   Assessment & Plan    Anemia versus edema versus pneumonia  improving now that anemia is better   cont on ceftriaxone and azithromycin, de-escalate if procalcitonin is negative  Supplemental oxygen continued, wean as tolerated     Occult blood positive stool   Assessment & Plan    No hx of Colonoscopy per patient  Has been having some black stool intermittently over the last 6 months  Monitor h/h [id number]  hgb 3 in ER, has received 2 units of RBC so far.   Pt has been cleared by cardiology, and GI is following, plan for EGD/Colonoscopy postponed for  tomorrow 5/12   appreciate rec.        JOVEL (dyspnea on exertion)   Assessment & Plan    Likely 2/2 acute anemia with hgb of 3.8  Has received 2 units and now her JOVEL is improving   Monitor        Elevated troponin- (present on admission)   Assessment & Plan    No chest pain on admission  Had a Stress test done which was negative, cardiology has signed off.        Hypokalemia- (present on admission)   Assessment & Plan    Replete as needed and monitor  Check mag      HTN- (present on admission)   Assessment & Plan    Controlled,however BP running low lately , likely 2/2 acute anemia  IV bolus give in ER, start IVF 100ml/hr  Hold BP meds  Monitor           VTE prophylaxis: scd

## 2019-05-11 NOTE — CARE PLAN
Problem: Communication  Goal: The ability to communicate needs accurately and effectively will improve  Outcome: PROGRESSING AS EXPECTED    Intervention: [place] patient and significant other/support system to call light to alert staff of needs   05/10/19 9402   OTHER   Oriented to: All of the Following : Location of Bathroom, Visiting Policy, Unit Routine, Call Light and Bedside Controls, Bedside Rail Policy, Smoking Policy, Rights and Responsibilities, Bedside Report, and Patient Education Notebook         Problem: Bowel/Gastric:  Goal: Normal bowel function is maintained or improved  Outcome: PROGRESSING AS EXPECTED  Educated patient on the importance of early ambulation, drinking fluids and eating a balance diet to maintain proper bowel function. Bowel prep given for colonoscopy tomorrow. Frequent trips to the bathroom.

## 2019-05-12 LAB
ANION GAP SERPL CALC-SCNC: 7 MMOL/L (ref 0–11.9)
BUN SERPL-MCNC: 7 MG/DL (ref 8–22)
CALCIUM SERPL-MCNC: 8.1 MG/DL (ref 8.5–10.5)
CHLORIDE SERPL-SCNC: 112 MMOL/L (ref 96–112)
CO2 SERPL-SCNC: 21 MMOL/L (ref 20–33)
CREAT SERPL-MCNC: 0.68 MG/DL (ref 0.5–1.4)
ERYTHROCYTE [DISTWIDTH] IN BLOOD BY AUTOMATED COUNT: 64.6 FL (ref 35.9–50)
GLUCOSE SERPL-MCNC: 87 MG/DL (ref 65–99)
HCT VFR BLD AUTO: 24.4 % (ref 37–47)
HCT VFR BLD AUTO: 24.7 % (ref 37–47)
HGB BLD-MCNC: 7.1 G/DL (ref 12–16)
HGB BLD-MCNC: 7.4 G/DL (ref 12–16)
MCH RBC QN AUTO: 24.6 PG (ref 27–33)
MCHC RBC AUTO-ENTMCNC: 30.3 G/DL (ref 33.6–35)
MCV RBC AUTO: 81.1 FL (ref 81.4–97.8)
MORPHOLOGY BLD-IMP: NORMAL
PLATELET # BLD AUTO: 463 K/UL (ref 164–446)
PMV BLD AUTO: 9 FL (ref 9–12.9)
POTASSIUM SERPL-SCNC: 3.2 MMOL/L (ref 3.6–5.5)
RBC # BLD AUTO: 3.01 M/UL (ref 4.2–5.4)
SODIUM SERPL-SCNC: 140 MMOL/L (ref 135–145)
WBC # BLD AUTO: 8.4 K/UL (ref 4.8–10.8)

## 2019-05-12 PROCEDURE — 700102 HCHG RX REV CODE 250 W/ 637 OVERRIDE(OP): Performed by: HOSPITALIST

## 2019-05-12 PROCEDURE — 160208 HCHG ENDO MINUTES - EA ADDL 1 MIN LEVEL 4: Performed by: INTERNAL MEDICINE

## 2019-05-12 PROCEDURE — 99232 SBSQ HOSP IP/OBS MODERATE 35: CPT | Performed by: INTERNAL MEDICINE

## 2019-05-12 PROCEDURE — 85018 HEMOGLOBIN: CPT

## 2019-05-12 PROCEDURE — 99153 MOD SED SAME PHYS/QHP EA: CPT | Performed by: INTERNAL MEDICINE

## 2019-05-12 PROCEDURE — A9270 NON-COVERED ITEM OR SERVICE: HCPCS | Performed by: HOSPITALIST

## 2019-05-12 PROCEDURE — 160048 HCHG OR STATISTICAL LEVEL 1-5: Performed by: INTERNAL MEDICINE

## 2019-05-12 PROCEDURE — 0DJ08ZZ INSPECTION OF UPPER INTESTINAL TRACT, VIA NATURAL OR ARTIFICIAL OPENING ENDOSCOPIC: ICD-10-PCS | Performed by: INTERNAL MEDICINE

## 2019-05-12 PROCEDURE — 160002 HCHG RECOVERY MINUTES (STAT): Performed by: INTERNAL MEDICINE

## 2019-05-12 PROCEDURE — 85014 HEMATOCRIT: CPT

## 2019-05-12 PROCEDURE — 160035 HCHG PACU - 1ST 60 MINS PHASE I: Performed by: INTERNAL MEDICINE

## 2019-05-12 PROCEDURE — 99152 MOD SED SAME PHYS/QHP 5/>YRS: CPT | Performed by: INTERNAL MEDICINE

## 2019-05-12 PROCEDURE — 770020 HCHG ROOM/CARE - TELE (206)

## 2019-05-12 PROCEDURE — 36415 COLL VENOUS BLD VENIPUNCTURE: CPT

## 2019-05-12 PROCEDURE — 500066 HCHG BITE BLOCK, ECT: Performed by: INTERNAL MEDICINE

## 2019-05-12 PROCEDURE — 80048 BASIC METABOLIC PNL TOTAL CA: CPT

## 2019-05-12 PROCEDURE — 160203 HCHG ENDO MINUTES - 1ST 30 MINS LEVEL 4: Performed by: INTERNAL MEDICINE

## 2019-05-12 PROCEDURE — 85027 COMPLETE CBC AUTOMATED: CPT

## 2019-05-12 RX ORDER — MIDAZOLAM HYDROCHLORIDE 1 MG/ML
.5-2 INJECTION INTRAMUSCULAR; INTRAVENOUS PRN
Status: DISCONTINUED | OUTPATIENT
Start: 2019-05-12 | End: 2019-05-12 | Stop reason: HOSPADM

## 2019-05-12 RX ORDER — MIDAZOLAM HYDROCHLORIDE 1 MG/ML
INJECTION INTRAMUSCULAR; INTRAVENOUS
Status: DISCONTINUED | OUTPATIENT
Start: 2019-05-12 | End: 2019-05-12 | Stop reason: HOSPADM

## 2019-05-12 RX ORDER — SODIUM CHLORIDE 9 MG/ML
500 INJECTION, SOLUTION INTRAVENOUS
Status: DISCONTINUED | OUTPATIENT
Start: 2019-05-12 | End: 2019-05-12 | Stop reason: HOSPADM

## 2019-05-12 RX ADMIN — OMEPRAZOLE 20 MG: 20 CAPSULE, DELAYED RELEASE ORAL at 05:34

## 2019-05-12 RX ADMIN — OMEPRAZOLE 20 MG: 20 CAPSULE, DELAYED RELEASE ORAL at 17:35

## 2019-05-12 RX ADMIN — CITALOPRAM HYDROBROMIDE 40 MG: 40 TABLET ORAL at 05:34

## 2019-05-12 ASSESSMENT — ENCOUNTER SYMPTOMS
FEVER: 0
WHEEZING: 0
PALPITATIONS: 0
MYALGIAS: 0
CHILLS: 0
NAUSEA: 0
DIZZINESS: 0
SPUTUM PRODUCTION: 0
VOMITING: 0
BACK PAIN: 0
HEADACHES: 0
COUGH: 0
WEAKNESS: 1
SHORTNESS OF BREATH: 1

## 2019-05-12 NOTE — PROCEDURES
DATE OF SERVICE:  05/12/2019    FIRST PROCEDURE:  Flexible esophagogastroduodenoscopy.    PREPROCEDURE DIAGNOSES:  Anemia and dark stools, Hemoccult positive.    POSTPROCEDURE DIAGNOSES:  Diffuse erosive gastritis, hiatal hernia, Schatzki's   ring.    CONSENT:  Risks, benefits, and alternatives have been explained to the   patient.  Patient gave consent to proceed.    PROCEDURE START TIME:  9:46 a.m.    PROCEDURES END TIME:  9:55 a.m.    MEDICATIONS:  Fentanyl 75 mcg IV and Versed 4 mg IV.    DESCRIPTION OF PROCEDURE:  The patient was in the left lateral decubitus   position and monitoring was in place.  The Olympus flexible endoscope was   inserted into the esophagus under direct visualization.  It was slowly   advanced to the GE junction.  There was a Schatzki's ring with a lumen   diameter 11-12 mm.  The Schatzki's ring was thin and web-like.  There was a   hiatal hernia approximately 5 cm in size.  The endoscope was advanced further   into the stomach and air was insufflated.  There were diffuse erosions   throughout the body and antrum.  There was no active bleeding.  There was no   blood in the lumen.  There was no erythema.  Retroflexion was performed and   again the hiatal hernia was appreciated.  Retroflexion was taken down and the   endoscope was advanced through the pylorus and the first, second, and third   portions of the duodenum.  There were no mucosal abnormalities.  ____ was   pulled back into the stomach.  Air was removed and the endoscope was removed.    The patient tolerated the procedure well.  No complication.    SECOND PROCEDURE:  Colonoscopy.    PREPROCEDURE DIAGNOSES:  Anemia, dark stools, Heme positive stool.    POSTPROCEDURE DIAGNOSIS:  Pandiverticulosis.    CONSENT:  Risks, benefits, and alternatives were explained to the patient.    Patient gave consent to proceed.    PROCEDURE START TIME: 10:01    PROCEDURE END TIME:  10:26    Moderate sedation, fentanyl 25 mcg IV with residual  sedation.    DESCRIPTION OF PROCEDURE:  The patient was turned in the left lateral   decubitus position and monitoring was in place.  The Olympus pediatric   adjustable tip colonoscope was inserted into the rectum under direct   visualization.  With considerable difficulty it was advanced to the cecum.    There was marked tortuosity and looping.  The preparation of the colon was   good.  The appendiceal orifice was identified.  The colonoscope was slowly   withdrawn from the cecum through the ascending colon and across the transverse   colon.  Other than diverticula, there were no mucosal abnormalities.  There   was no blood in the lumen.  The colonoscope was brought down through the   descending colon, sigmoid colon, down to the rectum, and no polyps or masses.    Again, no blood in the lumen.  In the rectum, retroflexion was performed and   no abnormalities of the dentate line.  Retroflexion was taken down, air was   removed, and the colonoscope was removed.  The patient tolerated the procedure   well.  No complications.       ____________________________________     MD GAL BURT / ROXANA    DD:  05/12/2019 10:39:20  DT:  05/12/2019 11:24:40    D#:  [id number]  Job#:  [id number]

## 2019-05-12 NOTE — PROGRESS NOTES
Hospital Medicine Daily Progress Note    Date of Service  5/12/2019    Chief Complaint  84 y.o. female admitted 5/7/2019 with weakness and SOB     Hospital Course    This is a 85 y/o F with pmhx of anxiety/depression presents to ER on 5/7/19  in with her daughter for increasing weakness. The daughter states that over the past 3 days she has not really been eating well and has been weak overall. In Er pt has been found to have severe anemia with Hg of 3. Pt states was having black stools intermittently over the past 6 months or so.  Her occult stool blood is positive so GI has been consulted, but her troponin is also elevated, and is trending up, so Cardiology has been consulted.        Interval Problem Update  Pt seen and examined , afebrile,GI following and plan for EGD/colonoscopy today appreciate rec.   Denies any CP, SOB nausea or vomiting.    Consultants/Specialty  GI  Cardiology     Code Status  DNR/DNI    Disposition  TBD     Review of Systems  Review of Systems   Constitutional: Positive for malaise/fatigue. Negative for chills and fever.   HENT: Negative for congestion and ear pain.    Respiratory: Positive for shortness of breath. Negative for cough, sputum production and wheezing.    Cardiovascular: Negative for chest pain and palpitations.   Gastrointestinal: Positive for melena. Negative for nausea and vomiting.   Genitourinary: Negative for dysuria and urgency.   Musculoskeletal: Negative for back pain and myalgias.   Skin: Negative for itching and rash.   Neurological: Positive for weakness. Negative for dizziness and headaches.   All other systems reviewed and are negative.       Physical Exam  Temp:  [36 °C (96.8 °F)-37.1 °C (98.7 °F)] 36 °C (96.8 °F)  Pulse:  [65-90] 69  Resp:  [11-36] 13  BP: (108-144)/(59-66) 135/61  SpO2:  [92 %-99 %] 99 %    Physical Exam   Constitutional: She is oriented to person, place, and time.   HENT:   Head: Normocephalic and atraumatic.   Eyes: Conjunctivae are normal.  No scleral icterus.   Neck: Neck supple. No JVD present.   Cardiovascular: Normal rate.  Exam reveals no gallop.    Pulmonary/Chest: She has no wheezes. She has no rales.   Abdominal: Soft. Bowel sounds are normal. She exhibits no distension. There is no tenderness.   Musculoskeletal: She exhibits no edema.   Neurological: She is alert and oriented to person, place, and time. No cranial nerve deficit.   Skin: Skin is warm and dry.   Nursing note and vitals reviewed.      Fluids  No intake or output data in the 24 hours ending 05/12/19 1137    Laboratory  Recent Labs      05/10/19   0320 05/11/19 0317 05/11/19   1523  05/11/19   2150  05/12/19   0323   WBC  10.9*   --   7.6   --    --    --   8.4   RBC  2.97*   --   2.98*   --    --    --   3.01*   HEMOGLOBIN  7.3*   < >  7.3*   < >  7.8*  7.5*  7.4*   HEMATOCRIT  24.3*   < >  24.7*   < >  26.2*  25.5*  24.4*   MCV  81.8   --   82.9   --    --    --   81.1*   MCH  24.6*   --   24.5*   --    --    --   24.6*   MCHC  30.0*   --   29.6*   --    --    --   30.3*   RDW  59.8*   --   64.8*   --    --    --   64.6*   PLATELETCT  522*   --   517*   --    --    --   463*   MPV  9.8   --   9.5   --    --    --   9.0    < > = values in this interval not displayed.     Recent Labs      05/10/19   0320 05/11/19 0317 05/12/19   0323   SODIUM  139  142  140   POTASSIUM  4.3  3.9  3.2*   CHLORIDE  112  115*  112   CO2  20  18*  21   GLUCOSE  82  86  87   BUN  9  9  7*   CREATININE  0.81  0.83  0.68   CALCIUM  8.4*  8.4*  8.1*                   Imaging  NM-CARDIAC STRESS TEST   Final Result      EC-ECHOCARDIOGRAM COMPLETE W/O CONT   Final Result      DX-CHEST-PORTABLE (1 VIEW)   Final Result      1.  Small to moderate left pleural effusion with associated left basilar atelectasis versus consolidation.   2.  Mild diffuse interstitial edema.   3.  Borderline cardiomegaly.           Assessment/Plan  * Severe anemia   Assessment & Plan    Patient coming in with a hemoglobin of  3.8 and dyspnea on exertion and generalized weakness.  Acute severe anemia likely secondary to GI bleed black stools for several months intermittently.  She has never had a colonoscopy for Hemoccult is positive here in the ED.    GI following plan for EGD and colonoscopy today now that pt has been cleared by cardiology   Monitor H/h and transfuse if less then 7      Acute respiratory failure with hypoxia (HCC)   Assessment & Plan    Anemia versus edema versus pneumonia  improving now that anemia is better   cont on ceftriaxone and azithromycin, de-escalate if procalcitonin is negative  Supplemental oxygen continued, wean as tolerated     Occult blood positive stool   Assessment & Plan    No hx of Colonoscopy per patient  Has been having some black stool intermittently over the last 6 months  Monitor h/h [id number]  hgb 3 in ER, has received 2 units of RBC so far.   Pt has been cleared by cardiology, and GI is following, plan for EGD/Colonoscopy today  appreciate rec.        JOVEL (dyspnea on exertion)   Assessment & Plan    Likely 2/2 acute anemia with hgb of 3.8  Has received 2 units and now her JOVEL is improving   Monitor        Elevated troponin- (present on admission)   Assessment & Plan    No chest pain on admission  Had a Stress test done which was negative, cardiology has signed off.        Hypokalemia- (present on admission)   Assessment & Plan    Replete as needed and monitor  Check mag      HTN- (present on admission)   Assessment & Plan    Controlled,however BP running low lately , likely 2/2 acute anemia  IV bolus give in ER, start IVF 100ml/hr  Hold BP meds  Monitor           VTE prophylaxis: scd

## 2019-05-12 NOTE — PROGRESS NOTES
Pt A&Ox4. ESTRADA. Generalized weakness.  Up 1PA contact guard to bathroom.  Incontinent at times.  No bm as of now.     1145 Pt arrived back to floor via [facility].           VSS Post procedure. Pt is drowsy.            Pt on 2 L O2

## 2019-05-12 NOTE — CARE PLAN
Problem: Safety  Goal: Will remain free from injury    Intervention: Provide assistance with mobility  Room free of clutter. Call light within reach.  Calls for assistance      Problem: Knowledge Deficit  Goal: Knowledge of disease process/condition, treatment plan, diagnostic tests, and medications will improve    Intervention: Assess knowledge level of disease process/condition, treatment plan, diagnostic tests, and medications  RN will assess knowledge of disease process and provide education as appropriate.

## 2019-05-12 NOTE — OR SURGEON
Immediate Post OP Note    PreOp Diagnosis: severe anemia, dark stools    PostOp Diagnosis: Schatzki's ring, hiatal hernia, erosive gastritis, pandivertiuclosis    Procedure(s):  GASTROSCOPY - Wound Class: Clean Contaminated  COLONOSCOPY - Wound Class: Clean Contaminated    Surgeon(s):  Anastacia Smith M.D.    EGD Procedure start time: 0946  Procedure end time: 0955    Colon procedure start time:  1001  End time:  1026    Anesthesiologist/Type of Anesthesia:  No anesthesia staff entered./Sedation:     EGD:  Fentanyl 75 mcg  + Versed 4 mg  IV  Colon: Fentanyl 25 mcg IV    Surgical Staff:  Endoscopy Technician: Laquita Garduno  Sedation/Monitoring Nurse: Chidi Sandoval R.N.    Specimens removed if any:  * No specimens in log *    Estimated Blood Loss:   Findings:     Esoph: Schatzki's ring, lumen diam 11-12mm  Stomach: 5 cm hiatal hernia, multiple gastric erosions body and antrum  Duod: normal    Scope to cecum, difficult due to looping and tortuosity  Prep good  Pandiverticulosis    Complications: none  Recs:  Avoid NSAIDs at home  Continue PPI once daily x 2 months  Signing off.  I will arrange follow up with us        5/12/2019 9:35 AM Anastacia Smith M.D.

## 2019-05-12 NOTE — PROGRESS NOTES
"Pt drank aprox 2/3 of Bowl prep gallon. Patient refused to drink rest tonight, States \" I did the best I can\" , educated regarding importance.  Education reinforced by SHERRELL Jeter.  Patient continues to refuse and falls asleep. Will adress in AM     0500- Pt BM clear this morning     "

## 2019-05-13 LAB
ABO GROUP BLD: NORMAL
ANION GAP SERPL CALC-SCNC: 7 MMOL/L (ref 0–11.9)
BARCODED ABORH UBTYP: 6200
BARCODED PRD CODE UBPRD: NORMAL
BARCODED UNIT NUM UBUNT: NORMAL
BLD GP AB SCN SERPL QL: NORMAL
BUN SERPL-MCNC: 16 MG/DL (ref 8–22)
CALCIUM SERPL-MCNC: 8.3 MG/DL (ref 8.5–10.5)
CHLORIDE SERPL-SCNC: 112 MMOL/L (ref 96–112)
CO2 SERPL-SCNC: 20 MMOL/L (ref 20–33)
COMPONENT R 8504R: NORMAL
CREAT SERPL-MCNC: 0.78 MG/DL (ref 0.5–1.4)
ERYTHROCYTE [DISTWIDTH] IN BLOOD BY AUTOMATED COUNT: 64.7 FL (ref 35.9–50)
GLUCOSE SERPL-MCNC: 105 MG/DL (ref 65–99)
HCT VFR BLD AUTO: 23.1 % (ref 37–47)
HCT VFR BLD AUTO: 29.4 % (ref 37–47)
HCT VFR BLD AUTO: 30.2 % (ref 37–47)
HCT VFR BLD AUTO: 30.5 % (ref 37–47)
HGB BLD-MCNC: 6.7 G/DL (ref 12–16)
HGB BLD-MCNC: 9 G/DL (ref 12–16)
HGB BLD-MCNC: 9.1 G/DL (ref 12–16)
HGB BLD-MCNC: 9.1 G/DL (ref 12–16)
MCH RBC QN AUTO: 23.8 PG (ref 27–33)
MCHC RBC AUTO-ENTMCNC: 29 G/DL (ref 33.6–35)
MCV RBC AUTO: 82.2 FL (ref 81.4–97.8)
PLATELET # BLD AUTO: 436 K/UL (ref 164–446)
PMV BLD AUTO: 8.7 FL (ref 9–12.9)
POTASSIUM SERPL-SCNC: 3.5 MMOL/L (ref 3.6–5.5)
PRODUCT TYPE UPROD: NORMAL
RBC # BLD AUTO: 2.81 M/UL (ref 4.2–5.4)
RH BLD: NORMAL
SODIUM SERPL-SCNC: 139 MMOL/L (ref 135–145)
UNIT STATUS USTAT: NORMAL
WBC # BLD AUTO: 8.2 K/UL (ref 4.8–10.8)

## 2019-05-13 PROCEDURE — 700102 HCHG RX REV CODE 250 W/ 637 OVERRIDE(OP): Performed by: HOSPITALIST

## 2019-05-13 PROCEDURE — 80048 BASIC METABOLIC PNL TOTAL CA: CPT | Mod: 91

## 2019-05-13 PROCEDURE — 86900 BLOOD TYPING SEROLOGIC ABO: CPT

## 2019-05-13 PROCEDURE — 85014 HEMATOCRIT: CPT

## 2019-05-13 PROCEDURE — 99232 SBSQ HOSP IP/OBS MODERATE 35: CPT | Performed by: INTERNAL MEDICINE

## 2019-05-13 PROCEDURE — 85027 COMPLETE CBC AUTOMATED: CPT | Mod: 91

## 2019-05-13 PROCEDURE — 36430 TRANSFUSION BLD/BLD COMPNT: CPT

## 2019-05-13 PROCEDURE — 770020 HCHG ROOM/CARE - TELE (206)

## 2019-05-13 PROCEDURE — 85018 HEMOGLOBIN: CPT

## 2019-05-13 PROCEDURE — 700105 HCHG RX REV CODE 258

## 2019-05-13 PROCEDURE — 86923 COMPATIBILITY TEST ELECTRIC: CPT

## 2019-05-13 PROCEDURE — A9270 NON-COVERED ITEM OR SERVICE: HCPCS | Performed by: HOSPITALIST

## 2019-05-13 PROCEDURE — 86901 BLOOD TYPING SEROLOGIC RH(D): CPT

## 2019-05-13 PROCEDURE — 36415 COLL VENOUS BLD VENIPUNCTURE: CPT

## 2019-05-13 PROCEDURE — 86850 RBC ANTIBODY SCREEN: CPT

## 2019-05-13 PROCEDURE — 30233N1 TRANSFUSION OF NONAUTOLOGOUS RED BLOOD CELLS INTO PERIPHERAL VEIN, PERCUTANEOUS APPROACH: ICD-10-PCS | Performed by: INTERNAL MEDICINE

## 2019-05-13 PROCEDURE — P9016 RBC LEUKOCYTES REDUCED: HCPCS

## 2019-05-13 RX ORDER — SODIUM CHLORIDE 9 MG/ML
INJECTION, SOLUTION INTRAVENOUS
Status: COMPLETED
Start: 2019-05-13 | End: 2019-05-13

## 2019-05-13 RX ADMIN — OMEPRAZOLE 20 MG: 20 CAPSULE, DELAYED RELEASE ORAL at 17:44

## 2019-05-13 RX ADMIN — OMEPRAZOLE 20 MG: 20 CAPSULE, DELAYED RELEASE ORAL at 05:38

## 2019-05-13 RX ADMIN — CITALOPRAM HYDROBROMIDE 40 MG: 40 TABLET ORAL at 05:38

## 2019-05-13 RX ADMIN — SODIUM CHLORIDE: 9 INJECTION, SOLUTION INTRAVENOUS at 01:15

## 2019-05-13 ASSESSMENT — ENCOUNTER SYMPTOMS
SPUTUM PRODUCTION: 0
WEAKNESS: 1
COUGH: 0
FEVER: 0
HEADACHES: 0
VOMITING: 0
MYALGIAS: 0
WHEEZING: 0
DIZZINESS: 0
SHORTNESS OF BREATH: 1
CHILLS: 0
BACK PAIN: 0
NAUSEA: 0
PALPITATIONS: 0

## 2019-05-13 NOTE — DISCHARGE INSTRUCTIONS
Discharge Instructions    Discharged to home by car with relative. Discharged via wheelchair, hospital escort: Refused.  Special equipment needed: Not Applicable    Be sure to schedule a follow-up appointment with your primary care doctor or any specialists as instructed.     Discharge Plan:   Pneumococcal Vaccine Administered/Refused: Not given - Patient refused pneumococcal vaccine  Influenza Vaccine Indication: Patient Refuses, Indicated: 65 years and older    I understand that a diet low in cholesterol, fat, and sodium is recommended for good health. Unless I have been given specific instructions below for another diet, I accept this instruction as my diet prescription.   Other diet: none    Special Instructions: None    · Is patient discharged on Warfarin / Coumadin?   No     Depression / Suicide Risk    As you are discharged from this [facility] facility, it is important to learn how to keep safe from harming yourself.    Recognize the warning signs:  · Abrupt changes in personality, positive or negative- including increase in energy   · Giving away possessions  · Change in eating patterns- significant weight changes-  positive or negative  · Change in sleeping patterns- unable to sleep or sleeping all the time   · Unwillingness or inability to communicate  · Depression  · Unusual sadness, discouragement and loneliness  · Talk of wanting to die  · Neglect of personal appearance   · Rebelliousness- reckless behavior  · Withdrawal from people/activities they love  · Confusion- inability to concentrate     If you or a loved one observes any of these behaviors or has concerns about self-harm, here's what you can do:  · Talk about it- your feelings and reasons for harming yourself  · Remove any means that you might use to hurt yourself (examples: pills, rope, extension cords, firearm)  · Get professional help from the community (Mental Health, Substance Abuse, psychological counseling)  · Do not be alone:Call  your Safe Contact- someone whom you trust who will be there for you.  · Call your local CRISIS HOTLINE 532-9041 or 306-955-3664  · Call your local Children's Mobile Crisis Response Team [place] (164) 455-4668 or www.Specle  · Call the toll free National Suicide Prevention Hotlines   · National Suicide Prevention Lifeline 453-716-SOKG (4439)  · [employer] Line Network 800-SUICIDE (734-4154)    Lower Gastrointestinal Bleeding  Lower gastrointestinal (GI) bleeding is the result of bleeding from the colon, rectum, or anal area. The colon is the last part of the digestive tract, where stool, also called feces, is formed. If you have lower GI bleeding, you may see blood in or on your stool. It may be bright red.  Lower GI bleeding often stops without treatment. Continued or heavy bleeding needs emergency treatment at the hospital.  What are the causes?  Lower GI bleeding may be caused by:  · A condition that causes pouches to form in the colon over time (diverticulosis).  · Swelling and irritation (inflammation) in areas with diverticulosis (diverticulitis).  · Inflammation of the colon (inflammatory bowel disease).  · Swollen veins in the rectum (hemorrhoids).  · Painful tears in the anus (anal fissures), often caused by passing hard stools.  · Cancer of the colon or rectum.  · Noncancerous growths (polyps) of the colon or rectum.  · A bleeding disorder that impairs the formation of blood clots and causes easy bleeding (coagulopathy).  · An abnormal weakening of a blood vessel where an artery and a vein come together (arteriovenous malformation).  What increases the risk?  You are more likely to develop this condition if:  · You are older than 60 years of age.  · You take aspirin or NSAIDs on a regular basis.  · You take anticoagulant or antiplatelet drugs.  · You have a history of high-dose X-ray treatment (radiation therapy) of the colon.  · You recently had a colon polyp removed.  What are the signs  or symptoms?  Symptoms of this condition include:  · Bright red blood or blood clots coming from your rectum.  · Bloody stools.  · Black or maroon-colored stools.  · Pain or cramping in the abdomen.  · Weakness or dizziness.  · Racing heartbeat.  How is this diagnosed?  This condition may be diagnosed based on:  · Your symptoms and medical history.  · A physical exam. During the exam, your health care provider will check for signs of blood loss, such as low blood pressure and a rapid pulse.  · Tests, such as:  ¨ Flexible sigmoidoscopy. In this procedure, a flexible tube with a camera on the end is used to examine your anus and the first part of your colon to look for the source of bleeding.  ¨ Colonoscopy. This is similar to a flexible sigmoidoscopy, but the camera can extend all the way to the uppermost part of your colon.  ¨ Blood tests to measure your red blood cell count and to check for coagulopathy.  ¨ An imaging study of your colon to look for a bleeding site. In some cases, you may have X-rays taken after a dye or radioactive substance is injected into your bloodstream (angiogram).  How is this treated?  Treatment for this condition depends on the cause of the bleeding. Heavy or persistent bleeding is treated at the hospital. Treatment may include:  · Getting fluids through an IV tube inserted into one of your veins.  · Getting blood through an IV tube (blood transfusion).  · Stopping bleeding through high-heat coagulation, injections of certain medicines, or applying surgical clips. This can all be done during a colonoscopy.  · Having a procedure that involves first doing an angiogram and then blocking blood flow to the bleeding site (embolization).  · Stopping some of your regular medicines for a certain amount of time.  · Having surgery to remove part of the colon. This may be needed if bleeding is severe and does not respond to other treatment.  Follow these instructions at home:  · Take over-the-counter  and prescription medicines only as told by your health care provider. You may need to avoid aspirin, NSAIDs, or other medicines that increase bleeding.  · Eat foods that are high in fiber. This will help keep your stools soft. These foods include whole grains, legumes, fruits, and vegetables. Eating 1-3 prunes each day works well for many people.  · Drink enough fluid to keep your urine clear or pale yellow.  · Keep all follow-up visits as told by your health care provider. This is important.  Contact a health care provider if:  · Your symptoms do not improve.  Get help right away if:  · Your bleeding increases.  · You feel light-headed or you faint.  · You feel weak.  · You have severe cramps in your back or abdomen.  · You pass large blood clots in your stool.  · Your symptoms get worse.  This information is not intended to replace advice given to you by your health care provider. Make sure you discuss any questions you have with your health care provider.  Document Released: 05/04/2017 Document Revised: 05/25/2017 Document Reviewed: 05/04/2017  [employer] Interactive Patient Education © 2017 [employer]

## 2019-05-13 NOTE — PROGRESS NOTES
Hospital Medicine Daily Progress Note    Date of Service  5/13/2019    Chief Complaint  84 y.o. female admitted 5/7/2019 with weakness and SOB     Hospital Course    This is a 83 y/o F with pmhx of anxiety/depression presents to ER on 5/7/19  in with her daughter for increasing weakness. The daughter states that over the past 3 days she has not really been eating well and has been weak overall. In Er pt has been found to have severe anemia with Hg of 3. Pt states was having black stools intermittently over the past 6 months or so.  Her occult stool blood is positive so GI has been consulted, but her troponin is also elevated, and is trending up, so Cardiology was consulted.  Pt had a stress test done and was negative, so cardiology signed off. GI then did a EGD and colonoscopy on 05/12/19 which showed some Schatzki ring, multiple gastric erosions, and pandiverticulosis.  Per GI cont PPI ovalles for 2 months. Pt h/h has been monitored.        Interval Problem Update  Afebrile,GI following and had EGD colonoscopy howed some Schatzki ring, multiple gastric erosions, and pandiverticulosis. Hemoglobin last night 6.7 had received 1 unit of rbc and is now 9.1 will continue to monitor   Denies any CP, SOB nausea or vomiting.    Consultants/Specialty  GI  Cardiology     Code Status  DNR/DNI    Disposition  TBD     Review of Systems  Review of Systems   Constitutional: Positive for malaise/fatigue. Negative for chills and fever.   HENT: Negative for congestion and ear pain.    Respiratory: Positive for shortness of breath. Negative for cough, sputum production and wheezing.    Cardiovascular: Negative for chest pain and palpitations.   Gastrointestinal: Positive for melena. Negative for nausea and vomiting.   Genitourinary: Negative for dysuria and urgency.   Musculoskeletal: Negative for back pain and myalgias.   Skin: Negative for itching and rash.   Neurological: Positive for weakness. Negative for dizziness and headaches.    All other systems reviewed and are negative.       Physical Exam  Temp:  [36.2 °C (97.1 °F)-37.3 °C (99.1 °F)] 36.7 °C (98 °F)  Pulse:  [72-81] 79  Resp:  [16-18] 16  BP: (120-148)/(38-90) 142/90  SpO2:  [93 %-99 %] 95 %    Physical Exam   Constitutional: She is oriented to person, place, and time.   HENT:   Head: Normocephalic and atraumatic.   Eyes: Conjunctivae are normal. No scleral icterus.   Neck: Neck supple. No JVD present.   Cardiovascular: Normal rate.  Exam reveals no gallop.    Pulmonary/Chest: She has no wheezes. She has no rales.   Abdominal: Soft. Bowel sounds are normal. She exhibits no distension. There is no tenderness.   Musculoskeletal: She exhibits no edema.   Neurological: She is alert and oriented to person, place, and time. No cranial nerve deficit.   Skin: Skin is warm and dry.   Nursing note and vitals reviewed.      Fluids    Intake/Output Summary (Last 24 hours) at 05/13/19 1450  Last data filed at 05/13/19 1200   Gross per 24 hour   Intake              980 ml   Output                0 ml   Net              980 ml       Laboratory  Recent Labs      05/11/19   0317   05/12/19   0323   05/13/19   0002  05/13/19   0554  05/13/19   1150   WBC  7.6   --   8.4   --   8.2   --    --    RBC  2.98*   --   3.01*   --   2.81*   --    --    HEMOGLOBIN  7.3*   < >  7.4*   < >  6.7*  9.1*  9.0*   HEMATOCRIT  24.7*   < >  24.4*   < >  23.1*  30.2*  29.4*   MCV  82.9   --   81.1*   --   82.2   --    --    MCH  24.5*   --   24.6*   --   23.8*   --    --    MCHC  29.6*   --   30.3*   --   29.0*   --    --    RDW  64.8*   --   64.6*   --   64.7*   --    --    PLATELETCT  517*   --   463*   --   436   --    --    MPV  9.5   --   9.0   --   8.7*   --    --     < > = values in this interval not displayed.     Recent Labs      05/11/19   0317  05/12/19   0323  05/13/19   0000   SODIUM  142  140  139   POTASSIUM  3.9  3.2*  3.5*   CHLORIDE  115*  112  112   CO2  18*  21  20   GLUCOSE  86  87  105*   BUN  9  7*   16   CREATININE  0.83  0.68  0.78   CALCIUM  8.4*  8.1*  8.3*                   Imaging  NM-CARDIAC STRESS TEST   Final Result      EC-ECHOCARDIOGRAM COMPLETE W/O CONT   Final Result      DX-CHEST-PORTABLE (1 VIEW)   Final Result      1.  Small to moderate left pleural effusion with associated left basilar atelectasis versus consolidation.   2.  Mild diffuse interstitial edema.   3.  Borderline cardiomegaly.           Assessment/Plan  * Severe anemia   Assessment & Plan    Patient coming in with a hemoglobin of 3.8 and dyspnea on exertion and generalized weakness.  Acute severe anemia likely secondary to GI bleed black stools for several months intermittently.  She has never had a colonoscopy for Hemoccult is positive here in the ED.    GI did a EGD and colonoscopy on 05/12/19 which showed some Schatzki ring, multiple gastric erosions, and pandiverticulosis.  Her hemoglobin dropped last night to 6.7 ?? Lab error, received 1 unit and is now 9.1 cont to monitor      Acute respiratory failure with hypoxia (HCC)   Assessment & Plan    Anemia versus edema versus pneumonia  improving now that anemia is better   cont on ceftriaxone and azithromycin, de-escalate if procalcitonin is negative  Supplemental oxygen continued, wean as tolerated     Occult blood positive stool   Assessment & Plan    No hx of Colonoscopy per patient  Has been having some black stool intermittently over the last 6 months  Monitor h/h p1pcofd  hgb 3 in ER, has received 2 units of RBC so far.   Pt has been cleared by cardiology, and GI did a EGD and colonoscopy on 05/12/19 which showed some Schatzki ring, multiple gastric erosions, and pandiverticulosis.  Hemoglobin last night 6.7 ?? Lab error received 1 unit of RBC and is now 9.1 will continue to monitor      JOVEL (dyspnea on exertion)   Assessment & Plan    Likely 2/2 acute anemia with hgb of 3.8  Has received 2 units and now her JOVEL is improving   Monitor        Elevated troponin- (present on  admission)   Assessment & Plan    No chest pain on admission  Had a Stress test done which was negative, cardiology has signed off.        Hypokalemia- (present on admission)   Assessment & Plan    Replete as needed and monitor  Check mag      HTN- (present on admission)   Assessment & Plan    Controlled,however BP running low lately , likely 2/2 acute anemia  IV bolus give in ER, start IVF 100ml/hr  Hold BP meds  Monitor           VTE prophylaxis: scd

## 2019-05-13 NOTE — DIETARY
Nutrition Services: Update   Day 6 of admit.  Rebecca Bishop is a 84 y.o. female with admitting DX of Severe anemia    Visited with pt at bedside. Pt sitting by window with blankets, appears thin. No changes to snacks, etc per pt. Pt said she was very hungry this morning and ate >50% of breakfast.    Pt is currently on Regular diet. PO intake >50% most meals.   Wt 40.7 kg via bed scale (5/12). BMI: 17.5 (Underweight) Admit weight 47.3 kg via bed scale (5/7).     Evaluation:  1. Weight loss 6.6 kg over 5 days. Pt with 1 dose Lasix on 5/7. Pt noted with multiple watery/loose BMs 5/8-5/11 (GOLYTELY on MAR). Pt was on NPO/Clear liquid diet x3 days 5/8-5/11. Question accuracy of weight (5/12), requested new weight (standing if possible) from RN.    2. EGD 5/12 - Diffuse erosive gastritis, hiatal hernia, Schatzki's ring.       Malnutrition Risk assessed on admit (5/8): Pt is at risk related to poor PO intake >5 days, unable to determine accurate weight loss at this time.    Recommendations/Plan:  1. Continue snacks, high protein milkshake   2. Encourage intake of meals  3. Document intake of all meals as % taken in ADL's to provide interdisciplinary communication across all shifts.   4. Monitor weight.  5. Nutrition rep will continue to see patient for ongoing meal and snack preferences.  6. Obtain supplement order per RD as needed.    RD following

## 2019-05-13 NOTE — PROGRESS NOTES
Report received at bedside, patient care assumed. Tele box on. Patient sitting up in chair, updated on POC, no requests at this time. Fall precautions in place with bed in lowest position, treaded socks on, and call light within reach.

## 2019-05-14 ENCOUNTER — PATIENT OUTREACH (OUTPATIENT)
Dept: HEALTH INFORMATION MANAGEMENT | Facility: OTHER | Age: 84
End: 2019-05-14

## 2019-05-14 VITALS
OXYGEN SATURATION: 96 % | HEART RATE: 79 BPM | TEMPERATURE: 99.4 F | HEIGHT: 60 IN | WEIGHT: 104.28 LBS | DIASTOLIC BLOOD PRESSURE: 61 MMHG | RESPIRATION RATE: 17 BRPM | BODY MASS INDEX: 20.47 KG/M2 | SYSTOLIC BLOOD PRESSURE: 135 MMHG

## 2019-05-14 LAB
ANION GAP SERPL CALC-SCNC: 9 MMOL/L (ref 0–11.9)
BUN SERPL-MCNC: 19 MG/DL (ref 8–22)
CALCIUM SERPL-MCNC: 8.3 MG/DL (ref 8.5–10.5)
CHLORIDE SERPL-SCNC: 113 MMOL/L (ref 96–112)
CO2 SERPL-SCNC: 20 MMOL/L (ref 20–33)
CREAT SERPL-MCNC: 0.76 MG/DL (ref 0.5–1.4)
ERYTHROCYTE [DISTWIDTH] IN BLOOD BY AUTOMATED COUNT: 61.1 FL (ref 35.9–50)
GLUCOSE SERPL-MCNC: 98 MG/DL (ref 65–99)
HCT VFR BLD AUTO: 27.5 % (ref 37–47)
HCT VFR BLD AUTO: 28.5 % (ref 37–47)
HCT VFR BLD AUTO: 31.2 % (ref 37–47)
HGB BLD-MCNC: 8.2 G/DL (ref 12–16)
HGB BLD-MCNC: 8.7 G/DL (ref 12–16)
HGB BLD-MCNC: 9.3 G/DL (ref 12–16)
MCH RBC QN AUTO: 24.5 PG (ref 27–33)
MCHC RBC AUTO-ENTMCNC: 29.8 G/DL (ref 33.6–35)
MCV RBC AUTO: 82.1 FL (ref 81.4–97.8)
PLATELET # BLD AUTO: 445 K/UL (ref 164–446)
PMV BLD AUTO: 9 FL (ref 9–12.9)
POTASSIUM SERPL-SCNC: 3.6 MMOL/L (ref 3.6–5.5)
RBC # BLD AUTO: 3.35 M/UL (ref 4.2–5.4)
SODIUM SERPL-SCNC: 142 MMOL/L (ref 135–145)
WBC # BLD AUTO: 9.6 K/UL (ref 4.8–10.8)

## 2019-05-14 PROCEDURE — 700102 HCHG RX REV CODE 250 W/ 637 OVERRIDE(OP): Performed by: HOSPITALIST

## 2019-05-14 PROCEDURE — A9270 NON-COVERED ITEM OR SERVICE: HCPCS | Performed by: INTERNAL MEDICINE

## 2019-05-14 PROCEDURE — 85014 HEMATOCRIT: CPT | Mod: 91

## 2019-05-14 PROCEDURE — 700102 HCHG RX REV CODE 250 W/ 637 OVERRIDE(OP): Performed by: INTERNAL MEDICINE

## 2019-05-14 PROCEDURE — A9270 NON-COVERED ITEM OR SERVICE: HCPCS | Performed by: HOSPITALIST

## 2019-05-14 PROCEDURE — 85018 HEMOGLOBIN: CPT

## 2019-05-14 PROCEDURE — 36415 COLL VENOUS BLD VENIPUNCTURE: CPT

## 2019-05-14 PROCEDURE — 99239 HOSP IP/OBS DSCHRG MGMT >30: CPT | Performed by: INTERNAL MEDICINE

## 2019-05-14 RX ORDER — OMEPRAZOLE 20 MG/1
20 CAPSULE, DELAYED RELEASE ORAL DAILY
Qty: 60 CAP | Refills: 2 | Status: SHIPPED | OUTPATIENT
Start: 2019-05-14 | End: 2020-08-08

## 2019-05-14 RX ORDER — POTASSIUM CHLORIDE 20 MEQ/1
40 TABLET, EXTENDED RELEASE ORAL ONCE
Status: COMPLETED | OUTPATIENT
Start: 2019-05-14 | End: 2019-05-14

## 2019-05-14 RX ADMIN — CITALOPRAM HYDROBROMIDE 40 MG: 40 TABLET ORAL at 05:31

## 2019-05-14 RX ADMIN — POTASSIUM CHLORIDE 40 MEQ: 1500 TABLET, EXTENDED RELEASE ORAL at 07:59

## 2019-05-14 RX ADMIN — OMEPRAZOLE 20 MG: 20 CAPSULE, DELAYED RELEASE ORAL at 05:31

## 2019-05-14 NOTE — CARE PLAN
Problem: Safety  Goal: Will remain free from injury  Bed alarm on, non slip socks on, bed in low position, 2 side rails up, call light within reach, will continue to monitor.    Problem: Urinary Elimination:  Goal: Ability to reestablish a normal urinary elimination pattern will improve  Patient urinating in bathroom with appropriate urinary elimination.

## 2019-05-14 NOTE — DISCHARGE SUMMARY
Discharge Summary    CHIEF COMPLAINT ON ADMISSION  Chief Complaint   Patient presents with   • Shortness of Breath   • Weakness     generalized   • Loss of Appetite       Reason for Admission  EMS     Admission Date  5/7/2019    CODE STATUS  DNAR/DNI    HPI & HOSPITAL COURSE    This is a 85 y/o F with pmhx of anxiety/depression presents to ER on 5/7/19  in with her daughter for increasing weakness. The daughter states that over the past 3 days she has not really been eating well and has been weak overall. In Er pt has been found to have severe anemia with Hg of 3. Pt states was having black stools intermittently over the past 6 months or so.  Her occult stool blood is positive so GI has been consulted, but her troponin is also elevated, and is trending up, so Cardiology was consulted.  Pt had a stress test done and was negative, so cardiology signed off. GI then did a EGD and colonoscopy on 05/12/19 which showed some Schatzki ring, multiple gastric erosions, and pandiverticulosis.  Per GI cont PPI ovalles for 2 months.   Patient found to have acute respiratory failure with hypoxia secondary to anemia and pneumonia.  She received course of  antibiotics in the hospital.  Patient received 1 unit of red blood cell   on 5/13 for hemoglobin 6.7.  Since then she did not experience signs of bleeding and her hemoglobin has been stable.           Therefore, she is discharged in good and stable condition to home with close outpatient follow-up.    The patient met 2-midnight criteria for an inpatient stay at the time of discharge.    Discharge Date  5/14/2019    FOLLOW UP ITEMS POST DISCHARGE  PCP    DISCHARGE DIAGNOSES  Principal Problem:    Severe anemia POA: Unknown  Active Problems:    Occult blood positive stool POA: Unknown    Acute respiratory failure with hypoxia (HCC) POA: Unknown    Elevated troponin POA: Yes    JOVEL (dyspnea on exertion) POA: Unknown    HTN POA: Yes    Hypokalemia POA: Yes  Resolved Problems:    * No  resolved hospital problems. *      FOLLOW UP  Future Appointments  Date Time Provider Department Center   5/29/2019 3:20 PM [facility] None     Duane Patel D.O.  [address]  [facility] [address]  [phone]    Go on 5/21/2019  Please arrive at 11:10 am for your appointment. Thank you       MEDICATIONS ON DISCHARGE     Medication List      START taking these medications      Instructions   omeprazole 20 MG delayed-release capsule  Commonly known as:  PRILOSEC   Take 1 Cap by mouth every day.  Dose:  20 mg        CONTINUE taking these medications      Instructions   amLODIPine 10 MG Tabs  Commonly known as:  NORVASC   Take 10 mg by mouth every day. Indications: High Blood Pressure  Dose:  10 mg     citalopram 40 MG Tabs  Commonly known as:  CELEXA   Take 40 mg by mouth every day.  Dose:  40 mg     Vitamin B-Complex Tabs   Take 1 Tab by mouth every day.  Dose:  1 Tab            Allergies  No Known Allergies    DIET  Orders Placed This Encounter   Procedures   • Diet Order Regular     Standing Status:   Standing     Number of Occurrences:   1     Order Specific Question:   Diet:     Answer:   Regular [1]       ACTIVITY  As tolerated.  Weight bearing as tolerated    CONSULTATIONS  Strength urology    PROCEDURES  EGD and colonoscopy    LABORATORY  Lab Results   Component Value Date    SODIUM 142 05/13/2019    POTASSIUM 3.6 05/13/2019    CHLORIDE 113 (H) 05/13/2019    CO2 20 05/13/2019    GLUCOSE 98 05/13/2019    BUN 19 05/13/2019    CREATININE 0.76 05/13/2019    CREATININE 0.8 04/06/2006        Lab Results   Component Value Date    WBC 9.6 05/13/2019    HEMOGLOBIN 9.3 (L) 05/14/2019    HEMATOCRIT 31.2 (L) 05/14/2019    PLATELETCT 445 05/13/2019      NM-CARDIAC STRESS TEST   Final Result      EC-ECHOCARDIOGRAM COMPLETE W/O CONT   Final Result      DX-CHEST-PORTABLE (1 VIEW)   Final Result      1.  Small to moderate left pleural effusion with associated left basilar atelectasis versus consolidation.   2.   Mild diffuse interstitial edema.   3.  Borderline cardiomegaly.        CONCLUSIONS  Normal left ventricular systolic function.  Left ventricular ejection fraction is visually estimated to be 60-65%.  Aortic sclerosis without stenosis.  Mild to moderate aortic insufficiency.  Moderately severe mitral regurgitation.  Severely dilated left atrium.  Right heart pressures are consistent with moderate pulmonary   hypertension.    Total time of the discharge process exceeds 37 minutes.

## 2019-05-14 NOTE — PROGRESS NOTES
Pt's daughter called per her request to set a time she could pick the pt up following DC. Pt's daughter states she will be here around 1400

## 2019-05-14 NOTE — PROGRESS NOTES
Received report and assumed care of patient. Patient is alert and oriented x3 disoriented to time. Patient is in no signs of distress denies pain at this time. Patient was updated on the plan of care for the day. Call light within reach, bed in low position, 2 side rails up. All fall precautions in place. Will continue to monitor.

## 2019-05-17 ENCOUNTER — TELEPHONE (OUTPATIENT)
Dept: CARDIOLOGY | Facility: MEDICAL CENTER | Age: 84
End: 2019-05-17

## 2019-05-17 NOTE — TELEPHONE ENCOUNTER
LVM for pt needing to confirmed if she had seen any prior cardiologist so we could get records.    Phone number  That we is wrong please confirmed with pt when check in her correct phone number . Thank you .

## 2019-05-29 ENCOUNTER — HOSPITAL ENCOUNTER (OUTPATIENT)
Dept: LAB | Facility: MEDICAL CENTER | Age: 84
End: 2019-05-29
Attending: FAMILY MEDICINE
Payer: MEDICARE

## 2019-05-29 LAB
ALBUMIN SERPL BCP-MCNC: 4 G/DL (ref 3.2–4.9)
ALBUMIN/GLOB SERPL: 1.4 G/DL
ALP SERPL-CCNC: 77 U/L (ref 30–99)
ALT SERPL-CCNC: 10 U/L (ref 2–50)
ANION GAP SERPL CALC-SCNC: 12 MMOL/L (ref 0–11.9)
ANISOCYTOSIS BLD QL SMEAR: ABNORMAL
AST SERPL-CCNC: 19 U/L (ref 12–45)
BASOPHILS # BLD AUTO: 3.5 % (ref 0–1.8)
BASOPHILS # BLD: 0.37 K/UL (ref 0–0.12)
BILIRUB SERPL-MCNC: 0.4 MG/DL (ref 0.1–1.5)
BUN SERPL-MCNC: 19 MG/DL (ref 8–22)
CALCIUM SERPL-MCNC: 9.3 MG/DL (ref 8.5–10.5)
CHLORIDE SERPL-SCNC: 107 MMOL/L (ref 96–112)
CHOLEST SERPL-MCNC: 218 MG/DL (ref 100–199)
CO2 SERPL-SCNC: 24 MMOL/L (ref 20–33)
CREAT SERPL-MCNC: 0.84 MG/DL (ref 0.5–1.4)
EOSINOPHIL # BLD AUTO: 0 K/UL (ref 0–0.51)
EOSINOPHIL NFR BLD: 0 % (ref 0–6.9)
ERYTHROCYTE [DISTWIDTH] IN BLOOD BY AUTOMATED COUNT: 68 FL (ref 35.9–50)
FASTING STATUS PATIENT QL REPORTED: NORMAL
FERRITIN SERPL-MCNC: 11.6 NG/ML (ref 10–291)
GLOBULIN SER CALC-MCNC: 2.9 G/DL (ref 1.9–3.5)
GLUCOSE SERPL-MCNC: 78 MG/DL (ref 65–99)
HCT VFR BLD AUTO: 35.6 % (ref 37–47)
HDLC SERPL-MCNC: 92 MG/DL
HGB BLD-MCNC: 10 G/DL (ref 12–16)
IRON SERPL-MCNC: 21 UG/DL (ref 40–170)
LDLC SERPL CALC-MCNC: 112 MG/DL
LYMPHOCYTES # BLD AUTO: 0.94 K/UL (ref 1–4.8)
LYMPHOCYTES NFR BLD: 8.8 % (ref 22–41)
MACROCYTES BLD QL SMEAR: ABNORMAL
MANUAL DIFF BLD: NORMAL
MCH RBC QN AUTO: 23.7 PG (ref 27–33)
MCHC RBC AUTO-ENTMCNC: 28.1 G/DL (ref 33.6–35)
MCV RBC AUTO: 84.4 FL (ref 81.4–97.8)
MICROCYTES BLD QL SMEAR: ABNORMAL
MONOCYTES # BLD AUTO: 0.57 K/UL (ref 0–0.85)
MONOCYTES NFR BLD AUTO: 5.3 % (ref 0–13.4)
MORPHOLOGY BLD-IMP: NORMAL
NEUTROPHILS # BLD AUTO: 8.82 K/UL (ref 2–7.15)
NEUTROPHILS NFR BLD: 82.4 % (ref 44–72)
NRBC # BLD AUTO: 0 K/UL
NRBC BLD-RTO: 0 /100 WBC
OVALOCYTES BLD QL SMEAR: NORMAL
PLATELET # BLD AUTO: 587 K/UL (ref 164–446)
PLATELET BLD QL SMEAR: NORMAL
PMV BLD AUTO: 9.7 FL (ref 9–12.9)
POIKILOCYTOSIS BLD QL SMEAR: NORMAL
POTASSIUM SERPL-SCNC: 3.9 MMOL/L (ref 3.6–5.5)
PROT SERPL-MCNC: 6.9 G/DL (ref 6–8.2)
RBC # BLD AUTO: 4.22 M/UL (ref 4.2–5.4)
RBC BLD AUTO: PRESENT
SODIUM SERPL-SCNC: 143 MMOL/L (ref 135–145)
TRIGL SERPL-MCNC: 70 MG/DL (ref 0–149)
WBC # BLD AUTO: 10.7 K/UL (ref 4.8–10.8)

## 2019-05-29 PROCEDURE — 82728 ASSAY OF FERRITIN: CPT

## 2019-05-29 PROCEDURE — 80053 COMPREHEN METABOLIC PANEL: CPT

## 2019-05-29 PROCEDURE — 83540 ASSAY OF IRON: CPT

## 2019-05-29 PROCEDURE — 85027 COMPLETE CBC AUTOMATED: CPT

## 2019-05-29 PROCEDURE — 85007 BL SMEAR W/DIFF WBC COUNT: CPT

## 2019-05-29 PROCEDURE — 36415 COLL VENOUS BLD VENIPUNCTURE: CPT

## 2019-05-29 PROCEDURE — 80061 LIPID PANEL: CPT

## 2019-09-10 ENCOUNTER — APPOINTMENT (OUTPATIENT)
Dept: RADIOLOGY | Facility: MEDICAL CENTER | Age: 84
DRG: 535 | End: 2019-09-10
Attending: EMERGENCY MEDICINE
Payer: MEDICARE

## 2019-09-10 ENCOUNTER — HOSPITAL ENCOUNTER (INPATIENT)
Facility: MEDICAL CENTER | Age: 84
LOS: 3 days | DRG: 535 | End: 2019-09-13
Attending: EMERGENCY MEDICINE | Admitting: INTERNAL MEDICINE
Payer: MEDICARE

## 2019-09-10 DIAGNOSIS — S32.502A CLOSED FRACTURE OF LEFT PUBIS, UNSPECIFIED PORTION OF PUBIS, INITIAL ENCOUNTER (HCC): ICD-10-CM
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
## 2019-09-10 DIAGNOSIS — S62.101A RIGHT WRIST FRACTURE, CLOSED, INITIAL ENCOUNTER: ICD-10-CM

## 2019-09-10 DIAGNOSIS — S32.10XA CLOSED FRACTURE OF SACRUM, UNSPECIFIED PORTION OF SACRUM, INITIAL ENCOUNTER (HCC): ICD-10-CM

## 2019-09-10 DIAGNOSIS — S32.592A CLOSED FRACTURE OF MULTIPLE RAMI OF LEFT PUBIS, INITIAL ENCOUNTER (HCC): ICD-10-CM

## 2019-09-10 PROCEDURE — 700111 HCHG RX REV CODE 636 W/ 250 OVERRIDE (IP): Performed by: EMERGENCY MEDICINE

## 2019-09-10 PROCEDURE — 96372 THER/PROPH/DIAG INJ SC/IM: CPT

## 2019-09-10 PROCEDURE — 72192 CT PELVIS W/O DYE: CPT

## 2019-09-10 PROCEDURE — 700102 HCHG RX REV CODE 250 W/ 637 OVERRIDE(OP): Performed by: INTERNAL MEDICINE

## 2019-09-10 PROCEDURE — A9270 NON-COVERED ITEM OR SERVICE: HCPCS | Performed by: INTERNAL MEDICINE

## 2019-09-10 PROCEDURE — 72100 X-RAY EXAM L-S SPINE 2/3 VWS: CPT

## 2019-09-10 PROCEDURE — 770006 HCHG ROOM/CARE - MED/SURG/GYN SEMI*

## 2019-09-10 PROCEDURE — 99285 EMERGENCY DEPT VISIT HI MDM: CPT

## 2019-09-10 PROCEDURE — 94760 N-INVAS EAR/PLS OXIMETRY 1: CPT

## 2019-09-10 PROCEDURE — 73502 X-RAY EXAM HIP UNI 2-3 VIEWS: CPT | Mod: LT

## 2019-09-10 PROCEDURE — 700111 HCHG RX REV CODE 636 W/ 250 OVERRIDE (IP): Performed by: INTERNAL MEDICINE

## 2019-09-10 PROCEDURE — 99222 1ST HOSP IP/OBS MODERATE 55: CPT | Mod: AI | Performed by: INTERNAL MEDICINE

## 2019-09-10 RX ORDER — HEPARIN SODIUM 5000 [USP'U]/ML
5000 INJECTION, SOLUTION INTRAVENOUS; SUBCUTANEOUS EVERY 8 HOURS
Status: DISCONTINUED | OUTPATIENT
Start: 2019-09-10 | End: 2019-09-13 | Stop reason: HOSPADM

## 2019-09-10 RX ORDER — BISACODYL 10 MG
10 SUPPOSITORY, RECTAL RECTAL
Status: DISCONTINUED | OUTPATIENT
Start: 2019-09-10 | End: 2019-09-13 | Stop reason: HOSPADM

## 2019-09-10 RX ORDER — AMLODIPINE BESYLATE 10 MG/1
10 TABLET ORAL DAILY
Status: DISCONTINUED | OUTPATIENT
Start: 2019-09-11 | End: 2019-09-13 | Stop reason: HOSPADM

## 2019-09-10 RX ORDER — ONDANSETRON 2 MG/ML
4 INJECTION INTRAMUSCULAR; INTRAVENOUS ONCE
Status: DISCONTINUED | OUTPATIENT
Start: 2019-09-10 | End: 2019-09-10

## 2019-09-10 RX ORDER — OXYCODONE HYDROCHLORIDE 5 MG/1
5 TABLET ORAL
Status: DISCONTINUED | OUTPATIENT
Start: 2019-09-10 | End: 2019-09-13 | Stop reason: HOSPADM

## 2019-09-10 RX ORDER — ACETAMINOPHEN 325 MG/1
650 TABLET ORAL EVERY 6 HOURS PRN
Status: DISCONTINUED | OUTPATIENT
Start: 2019-09-10 | End: 2019-09-13 | Stop reason: HOSPADM

## 2019-09-10 RX ORDER — ONDANSETRON 2 MG/ML
4 INJECTION INTRAMUSCULAR; INTRAVENOUS EVERY 4 HOURS PRN
Status: DISCONTINUED | OUTPATIENT
Start: 2019-09-10 | End: 2019-09-13 | Stop reason: HOSPADM

## 2019-09-10 RX ORDER — ONDANSETRON 4 MG/1
4 TABLET, ORALLY DISINTEGRATING ORAL ONCE
Status: COMPLETED | OUTPATIENT
Start: 2019-09-10 | End: 2019-09-10

## 2019-09-10 RX ORDER — CITALOPRAM 40 MG/1
40 TABLET ORAL DAILY
Status: DISCONTINUED | OUTPATIENT
Start: 2019-09-11 | End: 2019-09-13 | Stop reason: HOSPADM

## 2019-09-10 RX ORDER — AMOXICILLIN 250 MG
2 CAPSULE ORAL 2 TIMES DAILY
Status: DISCONTINUED | OUTPATIENT
Start: 2019-09-10 | End: 2019-09-13 | Stop reason: HOSPADM

## 2019-09-10 RX ORDER — POLYETHYLENE GLYCOL 3350 17 G/17G
1 POWDER, FOR SOLUTION ORAL
Status: DISCONTINUED | OUTPATIENT
Start: 2019-09-10 | End: 2019-09-13 | Stop reason: HOSPADM

## 2019-09-10 RX ORDER — OMEPRAZOLE 20 MG/1
20 CAPSULE, DELAYED RELEASE ORAL DAILY
Status: DISCONTINUED | OUTPATIENT
Start: 2019-09-11 | End: 2019-09-13 | Stop reason: HOSPADM

## 2019-09-10 RX ORDER — ONDANSETRON 4 MG/1
4 TABLET, ORALLY DISINTEGRATING ORAL EVERY 4 HOURS PRN
Status: DISCONTINUED | OUTPATIENT
Start: 2019-09-10 | End: 2019-09-13 | Stop reason: HOSPADM

## 2019-09-10 RX ORDER — MORPHINE SULFATE 4 MG/ML
4 INJECTION, SOLUTION INTRAMUSCULAR; INTRAVENOUS ONCE
Status: COMPLETED | OUTPATIENT
Start: 2019-09-10 | End: 2019-09-10

## 2019-09-10 RX ORDER — OXYCODONE HYDROCHLORIDE 5 MG/1
2.5 TABLET ORAL
Status: DISCONTINUED | OUTPATIENT
Start: 2019-09-10 | End: 2019-09-13 | Stop reason: HOSPADM

## 2019-09-10 RX ORDER — MORPHINE SULFATE 4 MG/ML
2 INJECTION, SOLUTION INTRAMUSCULAR; INTRAVENOUS
Status: DISCONTINUED | OUTPATIENT
Start: 2019-09-10 | End: 2019-09-13 | Stop reason: HOSPADM

## 2019-09-10 RX ORDER — IBUPROFEN 200 MG
200 TABLET ORAL
COMMUNITY
End: 2020-08-08

## 2019-09-10 RX ADMIN — HEPARIN SODIUM 5000 UNITS: 5000 INJECTION INTRAVENOUS; SUBCUTANEOUS at 20:37

## 2019-09-10 RX ADMIN — ONDANSETRON 4 MG: 4 TABLET, ORALLY DISINTEGRATING ORAL at 15:01

## 2019-09-10 RX ADMIN — OXYCODONE HYDROCHLORIDE 5 MG: 5 TABLET ORAL at 20:37

## 2019-09-10 RX ADMIN — MORPHINE SULFATE 4 MG: 4 INJECTION INTRAVENOUS at 15:02

## 2019-09-10 ASSESSMENT — PATIENT HEALTH QUESTIONNAIRE - PHQ9
1. LITTLE INTEREST OR PLEASURE IN DOING THINGS: NOT AT ALL
2. FEELING DOWN, DEPRESSED, IRRITABLE, OR HOPELESS: NOT AT ALL
SUM OF ALL RESPONSES TO PHQ9 QUESTIONS 1 AND 2: 0

## 2019-09-10 ASSESSMENT — COGNITIVE AND FUNCTIONAL STATUS - GENERAL
TOILETING: A LOT
MOVING TO AND FROM BED TO CHAIR: A LOT
DRESSING REGULAR LOWER BODY CLOTHING: A LOT
MOVING FROM LYING ON BACK TO SITTING ON SIDE OF FLAT BED: UNABLE
MOBILITY SCORE: 10
DAILY ACTIVITIY SCORE: 14
PERSONAL GROOMING: A LITTLE
WALKING IN HOSPITAL ROOM: A LOT
SUGGESTED CMS G CODE MODIFIER DAILY ACTIVITY: CK
TURNING FROM BACK TO SIDE WHILE IN FLAT BAD: A LOT
SUGGESTED CMS G CODE MODIFIER MOBILITY: CL
STANDING UP FROM CHAIR USING ARMS: A LOT
HELP NEEDED FOR BATHING: A LOT
EATING MEALS: A LITTLE
CLIMB 3 TO 5 STEPS WITH RAILING: TOTAL
DRESSING REGULAR UPPER BODY CLOTHING: A LOT

## 2019-09-10 ASSESSMENT — LIFESTYLE VARIABLES
EVER_SMOKED: NEVER
EVER FELT BAD OR GUILTY ABOUT YOUR DRINKING: NO
EVER HAD A DRINK FIRST THING IN THE MORNING TO STEADY YOUR NERVES TO GET RID OF A HANGOVER: NO
ON A TYPICAL DAY WHEN YOU DRINK ALCOHOL HOW MANY DRINKS DO YOU HAVE: 0
CONSUMPTION TOTAL: NEGATIVE
TOTAL SCORE: 0
HOW MANY TIMES IN THE PAST YEAR HAVE YOU HAD 5 OR MORE DRINKS IN A DAY: 0
HAVE PEOPLE ANNOYED YOU BY CRITICIZING YOUR DRINKING: NO
HAVE YOU EVER FELT YOU SHOULD CUT DOWN ON YOUR DRINKING: NO
TOTAL SCORE: 0
TOTAL SCORE: 0
AVERAGE NUMBER OF DAYS PER WEEK YOU HAVE A DRINK CONTAINING ALCOHOL: 0
ALCOHOL_USE: NO

## 2019-09-10 ASSESSMENT — COPD QUESTIONNAIRES
DO YOU EVER COUGH UP ANY MUCUS OR PHLEGM?: NO/ONLY WITH OCCASIONAL COLDS OR INFECTIONS
COPD SCREENING SCORE: 2
DURING THE PAST 4 WEEKS HOW MUCH DID YOU FEEL SHORT OF BREATH: NONE/LITTLE OF THE TIME
HAVE YOU SMOKED AT LEAST 100 CIGARETTES IN YOUR ENTIRE LIFE: NO/DON'T KNOW
IN THE PAST 12 MONTHS DO YOU DO LESS THAN YOU USED TO BECAUSE OF YOUR BREATHING PROBLEMS: DISAGREE/UNSURE

## 2019-09-10 NOTE — ED PROVIDER NOTES
ED Provider Note    Scribed for Balwinder Stevenson M.D. by Cameron Gutiérrez. 9/10/2019, 2:20 PM.    Primary care provider: Duane Patel D.O.  Means of arrival: EMS (transported with daughter)  History obtained from: Patient  History limited by: None    CHIEF COMPLAINT  Chief Complaint   Patient presents with   • Fall     Patient attempted to catch daughter and landed on buttocks from ground. Arrives with worsening pain of her chronic right hip pain and low back pain. Pt reports unable to walk after accident. Paramedics assisted patient into walker chair.        HPI  Rebecca Bishop is a 84 y.o. female who presents to the Emergency Department with concerns for moderate left hip pain acute onset shortly after arrival. Patient states that she was standing next to her daughter who had a syncopal episode. The patient tried to catch her daughter and landed on her left buttock. She accompanied her daughter in the ambulance, and her pain started after she arrived to the ED with her. She has been having worsening pain over her right hip which is worse than her chronic symptoms. She reports exacerbation of her pain from standing and walking. She denies any incontinence or numbness.     REVIEW OF SYSTEMS  See HPI above. Otherwise all other systems are negative.     PAST MEDICAL HISTORY  Patient has a past medical history of Hypertension, Stroke (HCC), and TIA (transient ischemic attack) (1/2015).    SURGICAL HISTORY  Patient has a past surgical history that includes ankle orif (Right, 6/1/2017); hip nailing intramedullary (Right, 10/10/2018); gastroscopy-endo (N/A, 5/12/2019); and colonoscopy - endo (N/A, 5/12/2019).    SOCIAL HISTORY  Social History     Tobacco Use   • Smoking status: Never Smoker   • Smokeless tobacco: Never Used   Substance Use Topics   • Alcohol use: Yes     Comment: SOCIAL   • Drug use: No      Social History     Substance and Sexual Activity   Drug Use No       FAMILY HISTORY  Family History   Problem  Relation Age of Onset   • Cancer Mother         breast cancer   • Cancer Sister         breast cancer       CURRENT MEDICATIONS  Current Outpatient Medications:   •  omeprazole (PRILOSEC) 20 MG delayed-release capsule, Take 1 Cap by mouth every day., Disp: 60 Cap, Rfl: 2  •  citalopram (CELEXA) 40 MG Tab, Take 40 mg by mouth every day., Disp: , Rfl:   •  B Complex Vitamins (VITAMIN B-COMPLEX) Tab, Take 1 Tab by mouth every day., Disp: , Rfl:   •  amlodipine (NORVASC) 10 MG Tab, Take 10 mg by mouth every day. Indications: High Blood Pressure, Disp: , Rfl:       ALLERGIES  No Known Allergies    PHYSICAL EXAM  VITAL SIGNS: /93   Pulse 85   Temp 36.7 °C (98.1 °F) (Temporal)   Resp 20   Ht 1.524 m (5')   Wt 46.7 kg (103 lb)   SpO2 95%   BMI 20.12 kg/m²     Constitutional: Well developed, Well nourished, No acute distress, Non-toxic appearance.   HENT: Normocephalic, Atraumatic, Bilateral external ears normal.  Eyes: conjunctiva is normal.    Skin: Warm, Dry, No erythema,   Cardiovascular: Regular rate and rhythm. No murmur.   Thorax/Lungs: Lungs clear to auscultation. No wheezing. No chest wall tenderness.   Musculoskeletal: Left buttocks, hip and lower back region are tender. Knees are nontender with good range of motion. Good internal and external rotation of the hip. No other bony tenderness. Intact distal pulses, no clubbing, no cyanosis, no edema.  Neurologic: Alert & oriented x 3, Normal motor function, Normal sensory function, No focal deficits noted.   Psychiatric: Affect normal, Judgment normal, Mood normal.     RADIOLOGY  DX-HIP-COMPLETE - UNILATERAL 2+ LEFT   Final Result      1.  Left superior and inferior pubic rami fractures, minimally displaced.      2.  Osteopenia      3.  Status post ORIF of the right femur, partially visualized.      DX-LUMBAR SPINE-2 OR 3 VIEWS   Final Result         1.  Levoscoliosis.      2.  No acute fractures identified.      3.  Multilevel degenerative disc disease  and facet arthropathy.      4.  Osteopenia.      CT-PELVIS W/O PLUS RECONS    (Results Pending)     The radiologist's interpretation of all radiological studies have been reviewed by me.    COURSE & MEDICAL DECISION MAKING  Nursing notes, VS, PMSFHx reviewed in chart.    2:20 PM - Patient seen and examined at bedside. Patient will be treated with morphine 4 mg IM and Zofran 4 mg. Ordered DX-lumbar spine and DX-hip/pelvis left to evaluate her symptoms. Differential diagnoses include but not limited to: hip fracture vs musculoskeletal injury.     4:33 PM - Reviewed radiology results which showed a fractured pelvis. Patient was reevaluated at bedside. Discussed radiology results with the patient and informed them that we will need to admit her for pain control and consultation with physical therapy. Of note, patient lives with her daughter in law who is currently admitted to the hospital as well.     4:41 PM - Paged Orthopedics.     4:45 PM - I discussed the patient's case and the above findings with Dr. Alcazar (Orthopedics) who recommended ordering a CT-Pelvis and admission to the Hospitalist.     4:46 PM - Paged Hospitalist.     5:12 PM - I spoke to Dr. Sen (hospitalist) regarding the patient’s pertinent abnormalities. They accept admission of the patient. Care has been turned over at this time, and orders have been placed.       Decision Making:  Patient presents emerged part for evaluation.  Clinically the patient is tender about the left hip region.  X-ray does show superior and inferior rami fracture.  I did speak with Dr. Alcazar recommended a CT scan of the pelvis.  At this point the patient is unable to ambulate on her own without significant assistance.  I do feel the patient will require admission the hospital for further pain control PT OT evaluation.  I spoken to the hospitalist for admission.    DISPOSITION:  Patient will be admitted to Dr. Sen in guarded condition.      FINAL IMPRESSION  1.  Closed fracture of multiple rami of left pubis, initial encounter ([facility])    2. Fall, initial encounter          I, Cameron Gutiérrez (Scribe), am scribing for, and in the presence of, Balwinder Stevenson M.D..    Electronically signed by: Cameron Gutiérrez (Scribe), 9/10/2019    IBalwinder M.D. personally performed the services described in this documentation, as scribed by Cameron Gutiérrez in my presence, and it is both accurate and complete. C    The note accurately reflects work and decisions made by me.  Balwinder Stevenson  9/10/2019  5:12 PM

## 2019-09-10 NOTE — ED TRIAGE NOTES
Patient arrives after trying to catch daughter while she was falling. Patient reports worsening back of her chronic right hip as well as low back pain. Patient reports being unable to walk after accident.

## 2019-09-11 ENCOUNTER — APPOINTMENT (OUTPATIENT)
Dept: RADIOLOGY | Facility: MEDICAL CENTER | Age: 84
DRG: 535 | End: 2019-09-11
Attending: ORTHOPAEDIC SURGERY
Payer: MEDICARE

## 2019-09-11 PROBLEM — J96.00 ACUTE RESPIRATORY FAILURE (HCC): Status: ACTIVE | Noted: 2019-05-07

## 2019-09-11 PROBLEM — S32.9XXA PELVIC FRACTURE (HCC): Status: ACTIVE | Noted: 2019-09-11

## 2019-09-11 LAB
ALBUMIN SERPL BCP-MCNC: 3.4 G/DL (ref 3.2–4.9)
ALBUMIN/GLOB SERPL: 1.4 G/DL
ALP SERPL-CCNC: 76 U/L (ref 30–99)
ALT SERPL-CCNC: 9 U/L (ref 2–50)
ANION GAP SERPL CALC-SCNC: 8 MMOL/L (ref 0–11.9)
AST SERPL-CCNC: 15 U/L (ref 12–45)
BASOPHILS # BLD AUTO: 0.7 % (ref 0–1.8)
BASOPHILS # BLD: 0.06 K/UL (ref 0–0.12)
BILIRUB SERPL-MCNC: 0.6 MG/DL (ref 0.1–1.5)
BUN SERPL-MCNC: 11 MG/DL (ref 8–22)
CALCIUM SERPL-MCNC: 8.9 MG/DL (ref 8.5–10.5)
CHLORIDE SERPL-SCNC: 104 MMOL/L (ref 96–112)
CO2 SERPL-SCNC: 27 MMOL/L (ref 20–33)
CREAT SERPL-MCNC: 0.9 MG/DL (ref 0.5–1.4)
EOSINOPHIL # BLD AUTO: 0.08 K/UL (ref 0–0.51)
EOSINOPHIL NFR BLD: 0.9 % (ref 0–6.9)
ERYTHROCYTE [DISTWIDTH] IN BLOOD BY AUTOMATED COUNT: 58.4 FL (ref 35.9–50)
GLOBULIN SER CALC-MCNC: 2.5 G/DL (ref 1.9–3.5)
GLUCOSE SERPL-MCNC: 107 MG/DL (ref 65–99)
HCT VFR BLD AUTO: 34.1 % (ref 37–47)
HGB BLD-MCNC: 10.5 G/DL (ref 12–16)
IMM GRANULOCYTES # BLD AUTO: 0.05 K/UL (ref 0–0.11)
IMM GRANULOCYTES NFR BLD AUTO: 0.6 % (ref 0–0.9)
LYMPHOCYTES # BLD AUTO: 1.55 K/UL (ref 1–4.8)
LYMPHOCYTES NFR BLD: 18.3 % (ref 22–41)
MCH RBC QN AUTO: 27.1 PG (ref 27–33)
MCHC RBC AUTO-ENTMCNC: 30.8 G/DL (ref 33.6–35)
MCV RBC AUTO: 88.1 FL (ref 81.4–97.8)
MONOCYTES # BLD AUTO: 0.83 K/UL (ref 0–0.85)
MONOCYTES NFR BLD AUTO: 9.8 % (ref 0–13.4)
NEUTROPHILS # BLD AUTO: 5.91 K/UL (ref 2–7.15)
NEUTROPHILS NFR BLD: 69.7 % (ref 44–72)
NRBC # BLD AUTO: 0 K/UL
NRBC BLD-RTO: 0 /100 WBC
PLATELET # BLD AUTO: 308 K/UL (ref 164–446)
PMV BLD AUTO: 9.7 FL (ref 9–12.9)
POTASSIUM SERPL-SCNC: 3 MMOL/L (ref 3.6–5.5)
PROT SERPL-MCNC: 5.9 G/DL (ref 6–8.2)
RBC # BLD AUTO: 3.87 M/UL (ref 4.2–5.4)
SODIUM SERPL-SCNC: 139 MMOL/L (ref 135–145)
WBC # BLD AUTO: 8.5 K/UL (ref 4.8–10.8)

## 2019-09-11 PROCEDURE — 700102 HCHG RX REV CODE 250 W/ 637 OVERRIDE(OP): Performed by: INTERNAL MEDICINE

## 2019-09-11 PROCEDURE — 72190 X-RAY EXAM OF PELVIS: CPT

## 2019-09-11 PROCEDURE — 770006 HCHG ROOM/CARE - MED/SURG/GYN SEMI*

## 2019-09-11 PROCEDURE — 99232 SBSQ HOSP IP/OBS MODERATE 35: CPT | Performed by: INTERNAL MEDICINE

## 2019-09-11 PROCEDURE — A9270 NON-COVERED ITEM OR SERVICE: HCPCS | Performed by: INTERNAL MEDICINE

## 2019-09-11 PROCEDURE — 36415 COLL VENOUS BLD VENIPUNCTURE: CPT

## 2019-09-11 PROCEDURE — 80053 COMPREHEN METABOLIC PANEL: CPT

## 2019-09-11 PROCEDURE — 700111 HCHG RX REV CODE 636 W/ 250 OVERRIDE (IP): Performed by: INTERNAL MEDICINE

## 2019-09-11 PROCEDURE — 85025 COMPLETE CBC W/AUTO DIFF WBC: CPT

## 2019-09-11 RX ORDER — POTASSIUM CHLORIDE 20 MEQ/1
40 TABLET, EXTENDED RELEASE ORAL DAILY
Status: DISCONTINUED | OUTPATIENT
Start: 2019-09-11 | End: 2019-09-13 | Stop reason: HOSPADM

## 2019-09-11 RX ADMIN — OXYCODONE HYDROCHLORIDE 5 MG: 5 TABLET ORAL at 09:38

## 2019-09-11 RX ADMIN — HEPARIN SODIUM 5000 UNITS: 5000 INJECTION INTRAVENOUS; SUBCUTANEOUS at 20:25

## 2019-09-11 RX ADMIN — SENNOSIDES, DOCUSATE SODIUM 2 TABLET: 50; 8.6 TABLET, FILM COATED ORAL at 04:57

## 2019-09-11 RX ADMIN — OMEPRAZOLE 20 MG: 20 CAPSULE, DELAYED RELEASE ORAL at 04:57

## 2019-09-11 RX ADMIN — HEPARIN SODIUM 5000 UNITS: 5000 INJECTION INTRAVENOUS; SUBCUTANEOUS at 04:57

## 2019-09-11 RX ADMIN — SENNOSIDES, DOCUSATE SODIUM 2 TABLET: 50; 8.6 TABLET, FILM COATED ORAL at 17:53

## 2019-09-11 RX ADMIN — AMLODIPINE BESYLATE 10 MG: 10 TABLET ORAL at 04:57

## 2019-09-11 RX ADMIN — HEPARIN SODIUM 5000 UNITS: 5000 INJECTION INTRAVENOUS; SUBCUTANEOUS at 13:48

## 2019-09-11 RX ADMIN — CITALOPRAM HYDROBROMIDE 40 MG: 40 TABLET ORAL at 04:57

## 2019-09-11 RX ADMIN — POTASSIUM CHLORIDE 40 MEQ: 20 TABLET, EXTENDED RELEASE ORAL at 04:57

## 2019-09-11 ASSESSMENT — ENCOUNTER SYMPTOMS
BLURRED VISION: 0
SPEECH CHANGE: 0
HEARTBURN: 0
HALLUCINATIONS: 0
DIAPHORESIS: 0
FLANK PAIN: 0
CONSTIPATION: 0
BACK PAIN: 0
TREMORS: 0
PND: 0
WEIGHT LOSS: 0
MEMORY LOSS: 0
SENSORY CHANGE: 0
SINUS PAIN: 0
COUGH: 0
SORE THROAT: 0
INSOMNIA: 0
SHORTNESS OF BREATH: 0
HEMOPTYSIS: 0
NECK PAIN: 0
TINGLING: 0
DEPRESSION: 0
PALPITATIONS: 0
NAUSEA: 0
SPUTUM PRODUCTION: 0
CHILLS: 0
WHEEZING: 0
DIZZINESS: 0
PHOTOPHOBIA: 0
WEAKNESS: 1
FEVER: 0
DOUBLE VISION: 0
BRUISES/BLEEDS EASILY: 0
VOMITING: 0
HEADACHES: 0
DIARRHEA: 0
ABDOMINAL PAIN: 0
ORTHOPNEA: 0
NERVOUS/ANXIOUS: 0
FOCAL WEAKNESS: 0
POLYDIPSIA: 0

## 2019-09-11 ASSESSMENT — LIFESTYLE VARIABLES: SUBSTANCE_ABUSE: 0

## 2019-09-11 NOTE — ED NOTES
Pt sleeping comfortably in bed, breathing is easy and unlabored. No s/s of distress noted. Pt has call bell in hand. Will continue to monitor.

## 2019-09-11 NOTE — DISCHARGE PLANNING
Per report from Ciara ESTRADA on [address], pt’s daughter is a pt in room T728-2, Payton Naik. Dtr and pt were shopping when dtr had a syncopal episode and both dtr and the pt fell, with pt sustaining pelvic fractures. Unable to ambulate. PT/OT to see pt.

## 2019-09-11 NOTE — PROGRESS NOTES
Hospital Medicine Daily Progress Note    Date of Service  9/11/2019    Chief Complaint  Acute on chronic right hip and low back pain    Hospital Course   Ms. Bishop is an 84-year-old female who was brought to the emergency department on 9/10/2019 after she attempted to catch her daughter who was falling and ended up landing on her butt.  Afterwards she was unable to walk due to worsening of her chronic right hip and low back pain.  An x-ray was performed and showed left superior and inferior pubic rami fractures. Orthopedic surgery was consulted and recommended a CT of her pelvis without contrast, which showed mildly displaced left inferior and superior pubic rami fractures with a small amount of adjacent hemorrhage in addition to a minimally displaced right sacral alar fracture.       Interval Problem Update  States her pain is controlled as long as she does not move.  Has been unable to get out of bed or ambulate due to pain.  Has no other complaints or issues.    Initial lab work-up done this morning showed normocytic anemia with an H/H of 10.5/34 and hypokalemia with a potassium level of 3 (already repleted), but were otherwise unremarkable.    Afebrile overnight, HR 70s, SBP teens-140s, O2 saturations 96-98% on 2 L/min of oxygen via nasal cannula.    Consultants/Specialty  Orthopedic surgery-Dr. Valenzuela    Code Status  Full code    Disposition  Clinical for now pending surgical evaluation and treatment plan.    Review of Systems  Review of Systems   Constitutional: Negative for chills, diaphoresis, fever and malaise/fatigue.   HENT: Negative for congestion, sinus pain and sore throat.    Respiratory: Negative for cough, shortness of breath and wheezing.    Cardiovascular: Negative for chest pain, palpitations, orthopnea, leg swelling and PND.   Gastrointestinal: Negative for abdominal pain, constipation, diarrhea, nausea and vomiting.   Genitourinary: Negative for dysuria, frequency and urgency.    Musculoskeletal:        Right hip pain with movement   Skin: Negative for itching and rash.   Neurological: Positive for weakness (Due to pain). Negative for dizziness, tingling, sensory change, speech change, focal weakness and headaches.   Psychiatric/Behavioral: Negative for depression, memory loss and substance abuse. The patient is not nervous/anxious and does not have insomnia.    All other systems reviewed and are negative.     Physical Exam  Temp:  [36.3 °C (97.4 °F)-36.7 °C (98.1 °F)] 36.5 °C (97.7 °F)  Pulse:  [73-79] 76  Resp:  [16-18] 16  BP: (112-147)/(45-77) 114/58  SpO2:  [96 %-98 %] 96 %    Physical Exam   Constitutional: She is oriented to person, place, and time. She is active and cooperative. She does not appear ill. No distress. Nasal cannula in place.   Thin   HENT:   Head: Normocephalic and atraumatic.   Right Ear: External ear normal.   Left Ear: External ear normal.   Mouth/Throat: Oropharynx is clear and moist.   Eyes: Pupils are equal, round, and reactive to light. Conjunctivae and EOM are normal. Right eye exhibits no discharge. Left eye exhibits no discharge. No scleral icterus.   Neck: Normal range of motion and phonation normal. Neck supple.   Cardiovascular: Normal rate, regular rhythm, normal heart sounds and intact distal pulses. Exam reveals no gallop and no friction rub.   No murmur heard.  Pulmonary/Chest: Effort normal and breath sounds normal. No accessory muscle usage or stridor. No respiratory distress. She has no decreased breath sounds. She has no wheezes. She has no rhonchi. She has no rales.   Abdominal: Soft. She exhibits no distension and no abdominal bruit. Bowel sounds are decreased. There is no tenderness. There is no rigidity and no guarding.   Musculoskeletal: She exhibits no edema.        Right hip: She exhibits decreased range of motion, decreased strength and tenderness.        Lumbar back: She exhibits pain (very mild).   Neurological: She is alert and  oriented to person, place, and time. No cranial nerve deficit or sensory deficit. She exhibits abnormal muscle tone. GCS eye subscore is 4. GCS verbal subscore is 5. GCS motor subscore is 6.   Skin: Skin is warm and dry. No rash noted. She is not diaphoretic. No erythema. No pallor.   Psychiatric: She has a normal mood and affect. Her speech is normal and behavior is normal. Judgment and thought content normal. Cognition and memory are normal.   Nursing note and vitals reviewed.    Fluids    Intake/Output Summary (Last 24 hours) at 9/11/2019 1841  Last data filed at 9/11/2019 1310  Gross per 24 hour   Intake 760 ml   Output no documentation   Net 760 ml     Laboratory  Recent Labs     09/11/19  0151   WBC 8.5   RBC 3.87*   HEMOGLOBIN 10.5*   HEMATOCRIT 34.1*   MCV 88.1   MCH 27.1   MCHC 30.8*   RDW 58.4*   PLATELETCT 308   MPV 9.7     Recent Labs     09/11/19  0151   SODIUM 139   POTASSIUM 3.0*   CHLORIDE 104   CO2 27   GLUCOSE 107*   BUN 11   CREATININE 0.90   CALCIUM 8.9     Imaging  CT-PELVIS W/O PLUS RECONS   Final Result   Addendum 1 of 1      9/10/2019 5:03 PM      HISTORY/REASON FOR EXAM:  superior inferior rami fractures seen on left,    ct for more eval of the pelvis; Pain/Deformity Following Trauma; superior    inferior rami fractures seen on left, ct for more eval of the pelvis.         TECHNIQUE/ EXAM DESCRIPTION AND NUMBER OF VIEWS:  CT scan of the    pelvis/hip without contrast and including reconstructions.      Thin-section helical images were obtained from the iliac crests through    the lesser trochanters with contrast. Coronal reconstructions were    generated from the axial images.      Up to date radiation dose reduction adjustments have been utilized to meet    ALARA standards for radiation dose reduction.      COMPARISON: LEFT hip radiographs 9/10/2019      FINDINGS:   Degenerative change of lower lumbar spine.   Minimally displaced RIGHT sacral alar fracture.  Mild displaced fractures    of  the LEFT inferior and superior pubic rami.   Postoperative change of RIGHT proximal femur.  Visualized proximal femurs    are intact and normally located.   Vascular calcifications.   Small amount of hemorrhage in the anterior pelvis adjacent pubic    symphysis.   Colonic diverticulosis.         1.  LEFT inferior and superior pubic ramus fractures, mildly displaced,    with small amount of adjacent hemorrhage.   2.  Minimally displaced RIGHT sacral alar fracture.   3.  No hip fracture or dislocation.   4.  Postoperative change of RIGHT proximal femur.      Final      DX-HIP-COMPLETE - UNILATERAL 2+ LEFT   Final Result   Addendum 1 of 1      9/10/2019 2:55 PM      HISTORY/REASON FOR EXAM:  Pelvic/Hip Pain Following Trauma.   Low back and hip pain following a fall      TECHNIQUE/EXAM DESCRIPTION AND NUMBER OF VIEWS:  2 views of the LEFT hip.      COMPARISON: 10/19/2018      FINDINGS:   Prior repair of the right femur is again noted. There is hypertrophic bony    change projecting from the greater and lesser trochanters. There are    fractures in the left superior and inferior pubic rami. The bones are    osteopenic.      There are phleboliths in the pelvis. There are scattered arterial    calcifications.      Degenerative changes are in the lower lumbar spine.         1.  Left superior and inferior pubic rami fractures, minimally displaced.      2.  Osteopenia      3.  Status post ORIF of the right femur, partially visualized.      Final      DX-LUMBAR SPINE-2 OR 3 VIEWS   Final Result   Addendum 1 of 1      9/10/2019 2:56 PM      HISTORY/REASON FOR EXAM:  Pain Following Trauma   Low back pain following a fall      TECHNIQUE/ EXAM DESCRIPTION AND NUMBER OF VIEWS:  2 views of the lumbar    spine.      COMPARISON: None.      FINDINGS:   There is levoscoliotic curvature.  No acute fracture.    There is    osteopenia. There is osteophytic spurring from the endplates of the lumbar    spine with moderate to severe  multilevel disc space narrowing. There is    severe facet arthropathy in the lower    lumbar spine. There is calcification in the aorta. SI joints are    unremarkable.            1.  Levoscoliosis.      2.  No acute fractures identified.      3.  Multilevel degenerative disc disease and facet arthropathy.      4.  Osteopenia.      Final      DX-PELVIS-TRAUMA SERIES  3-    (Results Pending)      Assessment/Plan  * Pelvic fracture (HCC)- (present on admission)  Assessment & Plan  Evaluated by orthopedic surgery.  Nonoperative.  WBAT RLE, 25% PWB LLE.   Continue DVT prophylaxis.  Continue fall precautions.  Awaiting PT/OT evaluation.  Will likely need SNF placement.    Acute respiratory failure with hypoxia (HCC)- (present on admission)  Assessment & Plan  History of pulmonary hypertension.  Does not wear oxygen at home.  Currently only on 1 L/min via nasal cannula.  Continue to encourage incentive spirometry.  Wean off oxygen as tolerated.    Hypokalemia- (present on admission)  Assessment & Plan  Replaced orally x1 this morning.  Will recheck BMP tomorrow.    Normocytic anemia- (present on admission)  Assessment & Plan  H/H appears stable from prior.  Trend PRN.    History of CVA- (present on admission)  Assessment & Plan  In 2014.  Ambulates with a walker at baseline.  PT/OT evaluation pending.    Essential hypertension- (present on admission)  Assessment & Plan  SBP overnight teens-140s.  Continue home amlodipine and trend per unit protocol.     VTE prophylaxis: Subcutaneous heparin    -----------------------------------------------------------------------------------------------------------------------------------------------------  Electronically signed by:  Palma Gonzalez, MSN, RN, APRN, ACNPC-AG, CCRN  Nurse Practitioner  [facility]  9/11/2019    6:41 PM

## 2019-09-11 NOTE — ED NOTES
Pt's food delivered. Pt updated on plan of care, informed that bed had just finished being cleaned and she would be moved as soon as possible, Pt voiced understanding, Pt denied any other needs at this time.

## 2019-09-11 NOTE — PROGRESS NOTES
CT reviewed  Right sacral fx with left SIPR fxs  Will see today and leave WB recommendations    WBAT RLE  25% PWB LLE  DVT proph  Baseline inlet/outlet xrays  PT/OT

## 2019-09-11 NOTE — ASSESSMENT & PLAN NOTE
Evaluated by orthopedic surgery.  Nonoperative.  WBAT RLE, 25% PWB LLE.   Continue DVT prophylaxis.  Continue fall precautions.  Continue pain control.  PT/OT recommending SNF placement prior to home.

## 2019-09-11 NOTE — ASSESSMENT & PLAN NOTE
In 2014.  No obvious residual deficits.    Ambulates with a walker at baseline.  PT/OT evaluation pending.

## 2019-09-11 NOTE — ED NOTES
Pt resting comfortably in bed. No s/s of distress noted. Pt denies any needs at this time. Will continue to monitor.

## 2019-09-11 NOTE — RESPIRATORY CARE
COPD EDUCATION by COPD CLINICAL EDUCATOR  9/11/2019 at 6:20 AM by Lisha Montgomery     Patient reviewed by COPD education team. Patient does not have a history or diagnosis of COPD, has hx of pulmonary hypertension, and is a non-smoker, therefore does not qualify for the COPD program.

## 2019-09-11 NOTE — PROGRESS NOTES
2 RN skin check complete with Le RN   Devices in place na  Skin assessed under devices na.  Confirmed pressure ulcers found on na  New potential pressure ulcers noted on na. Wound consult placed na.  The following interventions in place q2t, float heels*    Heels boggy but intact, no skin issues noted or s/s of breakdown noted.

## 2019-09-11 NOTE — ED NOTES
Called report to Raphael WYNNE. Pt is aware of POC. Pt belongings are on bed. Pt is ready for transport.

## 2019-09-11 NOTE — PROGRESS NOTES
Assumed care at 2030, bedside report received from day shift RN. Initial assessment completed, orders reviewed, call light within reach, bed alarm in use, and hourly rounding in place. POC addressed with patient, no additional questions at this time.

## 2019-09-11 NOTE — H&P
Hospital Medicine History & Physical Note    Date of Service  9/10/2019    Primary Care Physician  Duane Patel D.O.    Consultants  Orthopedic surgery by phone    Code Status  Full code    Chief Complaint  Left-sided hip pain, acute    History of Presenting Illness  84 y.o. female with history of chronic right hip pain, lower back pain, who presented 9/10/2019 with left-sided hip pain CVA, hypertension.  According to the patient, she was shopping with her daughter, when her daughter started feeling unwell and had syncopal episode.  Patient tried to prevent her from falling and as a result fell with her daughter , landing on her left buttock.  Currently complaining of left-sided pelvic pain, dull, exacerbated by movement, without radiation, up to 8 out of 10..  Upon evaluation, patient's noted to have closed fracture of multiple rami of left pubis.  ER doctor consulted with Dr. Raza/Ortho, who advised patient has no indication for surgery.  Therefore, patient is being admitted for pain control, she is unable to ambulate    Review of Systems  Review of Systems   Constitutional: Negative for chills, fever and weight loss.   HENT: Negative for ear pain, hearing loss and tinnitus.    Eyes: Negative for blurred vision, double vision and photophobia.   Respiratory: Negative for cough, hemoptysis and sputum production.    Cardiovascular: Negative for chest pain, palpitations and orthopnea.   Gastrointestinal: Negative for heartburn, nausea and vomiting.   Genitourinary: Negative for dysuria, flank pain, frequency and hematuria.   Musculoskeletal: Positive for joint pain. Negative for back pain and neck pain.   Skin: Negative for itching and rash.   Neurological: Negative for tremors, speech change, focal weakness and headaches.   Endo/Heme/Allergies: Negative for environmental allergies and polydipsia. Does not bruise/bleed easily.   Psychiatric/Behavioral: Negative for hallucinations and substance abuse. The patient  is not nervous/anxious.        Past Medical History   has a past medical history of Hypertension, Stroke (HCC), and TIA (transient ischemic attack) (1/2015). She also has no past medical history of Breast cancer (HCC).    Surgical History   has a past surgical history that includes ankle orif (Right, 6/1/2017); hip nailing intramedullary (Right, 10/10/2018); gastroscopy-endo (N/A, 5/12/2019); and colonoscopy - endo (N/A, 5/12/2019).     Family History  Cancer in her mother and sister.     Social History   reports that she has never smoked. She has never used smokeless tobacco. She reports that she drinks alcohol. She reports that she does not use drugs.    Allergies  No Known Allergies    Medications  Prior to Admission Medications   Prescriptions Last Dose Informant Patient Reported? Taking?   amlodipine (NORVASC) 10 MG Tab 9/10/2019 at AM Family Member Yes No   Sig: Take 10 mg by mouth every day. Indications: High Blood Pressure   citalopram (CELEXA) 40 MG Tab 9/10/2019 at AM Family Member Yes No   Sig: Take 40 mg by mouth every day.   ibuprofen (MOTRIN) 200 MG Tab 9/9/2019 at PM Family Member Yes Yes   Sig: Take 200 mg by mouth 1 time daily as needed for Mild Pain.   omeprazole (PRILOSEC) 20 MG delayed-release capsule 9/10/2019 at AM Family Member No No   Sig: Take 1 Cap by mouth every day.      Facility-Administered Medications: None       Physical Exam  Temp:  [36.3 °C (97.4 °F)-37.2 °C (99 °F)] 36.5 °C (97.7 °F)  Pulse:  [76-86] 76  Resp:  [16-20] 16  BP: (144-147)/(63-93) 146/63  SpO2:  [95 %-97 %] 97 %    Physical Exam   Constitutional: She is oriented to person, place, and time. She appears well-developed and well-nourished.   HENT:   Head: Normocephalic and atraumatic.   Eyes: Pupils are equal, round, and reactive to light. EOM are normal.   Neck: Normal range of motion. Neck supple.   Cardiovascular: Normal rate, regular rhythm and normal heart sounds.   Pulmonary/Chest: Effort normal and breath sounds  normal.   Abdominal: Soft. Bowel sounds are normal.   Musculoskeletal: Normal range of motion.   Tenderness to palpation in projection of left hip and left-sided pubic rami   Neurological: She is alert and oriented to person, place, and time.   Skin: Skin is warm and dry.   Psychiatric: She has a normal mood and affect.   Nursing note and vitals reviewed.      Laboratory:          No results for input(s): ALTSGPT, ASTSGOT, ALKPHOSPHAT, TBILIRUBIN, DBILIRUBIN, GAMMAGT, AMYLASE, LIPASE, ALB, PREALBUMIN, GLUCOSE in the last 72 hours.      No results for input(s): NTPROBNP in the last 72 hours.      No results for input(s): TROPONINT in the last 72 hours.    Urinalysis:    No results found     Imaging:  CT-PELVIS W/O PLUS RECONS   Final Result      1.  LEFT inferior and superior pubic ramus fractures, mildly displaced, with small amount of adjacent hemorrhage.   2.  Minimally displaced RIGHT sacral alar fracture.   3.  No hip fracture or dislocation.   4.  Postoperative change of RIGHT proximal femur.      DX-HIP-COMPLETE - UNILATERAL 2+ LEFT   Final Result      1.  Left superior and inferior pubic rami fractures, minimally displaced.      2.  Osteopenia      3.  Status post ORIF of the right femur, partially visualized.      DX-LUMBAR SPINE-2 OR 3 VIEWS   Final Result         1.  Levoscoliosis.      2.  No acute fractures identified.      3.  Multilevel degenerative disc disease and facet arthropathy.      4.  Osteopenia.            Assessment/Plan:  I anticipate this patient will require at least two midnights for appropriate medical management, necessitating inpatient admission.    Acute respiratory failure (HCC)  Assessment & Plan  History of pulmonary hypertension  Was noted to require oxygen during previous hospitalization  Limit opiates.  Mobilize  Incentive spirometry  Wean off oxygen as tolerated    Pelvic fracture (HCC)  Assessment & Plan  Patient is admitted for pain control and physical therapy.  Avoid  constipation with bowel regimen.  DVT prophylaxis with heparin  Fall precautions  PT OT evaluation    Hypokalemia- (present on admission)  Assessment & Plan  Replaced.    History of CVA- (present on admission)  Assessment & Plan  In 2014.  Ambulating with a walker at baseline  Patient will be evaluated by PT     HTN- (present on admission)  Assessment & Plan  Continue amlodipine.  Blood pressure fairly controlled.  Monitor      VTE prophylaxis: Heparin

## 2019-09-11 NOTE — CARE PLAN
Problem: Communication  Goal: The ability to communicate needs accurately and effectively will improve  Outcome: PROGRESSING AS EXPECTED     Problem: Safety  Goal: Will remain free from falls  Outcome: PROGRESSING AS EXPECTED     Problem: Venous Thromboembolism (VTW)/Deep Vein Thrombosis (DVT) Prevention:  Goal: Patient will participate in Venous Thrombosis (VTE)/Deep Vein Thrombosis (DVT)Prevention Measures  Outcome: PROGRESSING AS EXPECTED     Problem: Pain Management  Goal: Pain level will decrease to patient's comfort goal  Outcome: PROGRESSING AS EXPECTED

## 2019-09-11 NOTE — ASSESSMENT & PLAN NOTE
History of pulmonary hypertension.  Does not wear oxygen at home.  Is now doing well on room air.  Continue to encourage incentive spirometry.

## 2019-09-12 ENCOUNTER — APPOINTMENT (OUTPATIENT)
Dept: RADIOLOGY | Facility: MEDICAL CENTER | Age: 84
DRG: 535 | End: 2019-09-12
Attending: NURSE PRACTITIONER
Payer: MEDICARE

## 2019-09-12 LAB
ANION GAP SERPL CALC-SCNC: 7 MMOL/L (ref 0–11.9)
BASOPHILS # BLD AUTO: 0.6 % (ref 0–1.8)
BASOPHILS # BLD: 0.06 K/UL (ref 0–0.12)
BUN SERPL-MCNC: 11 MG/DL (ref 8–22)
CALCIUM SERPL-MCNC: 8.8 MG/DL (ref 8.5–10.5)
CHLORIDE SERPL-SCNC: 105 MMOL/L (ref 96–112)
CO2 SERPL-SCNC: 26 MMOL/L (ref 20–33)
CREAT SERPL-MCNC: 0.82 MG/DL (ref 0.5–1.4)
EOSINOPHIL # BLD AUTO: 0.17 K/UL (ref 0–0.51)
EOSINOPHIL NFR BLD: 1.8 % (ref 0–6.9)
ERYTHROCYTE [DISTWIDTH] IN BLOOD BY AUTOMATED COUNT: 56.7 FL (ref 35.9–50)
GLUCOSE SERPL-MCNC: 118 MG/DL (ref 65–99)
HCT VFR BLD AUTO: 32.6 % (ref 37–47)
HGB BLD-MCNC: 10.2 G/DL (ref 12–16)
IMM GRANULOCYTES # BLD AUTO: 0.04 K/UL (ref 0–0.11)
IMM GRANULOCYTES NFR BLD AUTO: 0.4 % (ref 0–0.9)
LYMPHOCYTES # BLD AUTO: 1.4 K/UL (ref 1–4.8)
LYMPHOCYTES NFR BLD: 14.9 % (ref 22–41)
MAGNESIUM SERPL-MCNC: 1.8 MG/DL (ref 1.5–2.5)
MCH RBC QN AUTO: 27.3 PG (ref 27–33)
MCHC RBC AUTO-ENTMCNC: 31.3 G/DL (ref 33.6–35)
MCV RBC AUTO: 87.2 FL (ref 81.4–97.8)
MONOCYTES # BLD AUTO: 1.03 K/UL (ref 0–0.85)
MONOCYTES NFR BLD AUTO: 11 % (ref 0–13.4)
NEUTROPHILS # BLD AUTO: 6.69 K/UL (ref 2–7.15)
NEUTROPHILS NFR BLD: 71.3 % (ref 44–72)
NRBC # BLD AUTO: 0 K/UL
NRBC BLD-RTO: 0 /100 WBC
PLATELET # BLD AUTO: 279 K/UL (ref 164–446)
PMV BLD AUTO: 10 FL (ref 9–12.9)
POTASSIUM SERPL-SCNC: 3.3 MMOL/L (ref 3.6–5.5)
RBC # BLD AUTO: 3.74 M/UL (ref 4.2–5.4)
SODIUM SERPL-SCNC: 138 MMOL/L (ref 135–145)
WBC # BLD AUTO: 9.4 K/UL (ref 4.8–10.8)

## 2019-09-12 PROCEDURE — 700111 HCHG RX REV CODE 636 W/ 250 OVERRIDE (IP): Performed by: INTERNAL MEDICINE

## 2019-09-12 PROCEDURE — 97165 OT EVAL LOW COMPLEX 30 MIN: CPT

## 2019-09-12 PROCEDURE — 97162 PT EVAL MOD COMPLEX 30 MIN: CPT

## 2019-09-12 PROCEDURE — 36415 COLL VENOUS BLD VENIPUNCTURE: CPT

## 2019-09-12 PROCEDURE — 700102 HCHG RX REV CODE 250 W/ 637 OVERRIDE(OP): Performed by: INTERNAL MEDICINE

## 2019-09-12 PROCEDURE — A9270 NON-COVERED ITEM OR SERVICE: HCPCS | Performed by: INTERNAL MEDICINE

## 2019-09-12 PROCEDURE — 80048 BASIC METABOLIC PNL TOTAL CA: CPT

## 2019-09-12 PROCEDURE — 70450 CT HEAD/BRAIN W/O DYE: CPT

## 2019-09-12 PROCEDURE — 99232 SBSQ HOSP IP/OBS MODERATE 35: CPT | Performed by: INTERNAL MEDICINE

## 2019-09-12 PROCEDURE — 700102 HCHG RX REV CODE 250 W/ 637 OVERRIDE(OP): Performed by: NURSE PRACTITIONER

## 2019-09-12 PROCEDURE — 770006 HCHG ROOM/CARE - MED/SURG/GYN SEMI*

## 2019-09-12 PROCEDURE — 83735 ASSAY OF MAGNESIUM: CPT

## 2019-09-12 PROCEDURE — 85025 COMPLETE CBC W/AUTO DIFF WBC: CPT

## 2019-09-12 PROCEDURE — A9270 NON-COVERED ITEM OR SERVICE: HCPCS | Performed by: NURSE PRACTITIONER

## 2019-09-12 RX ORDER — POTASSIUM CHLORIDE 20 MEQ/1
20 TABLET, EXTENDED RELEASE ORAL ONCE
Status: COMPLETED | OUTPATIENT
Start: 2019-09-12 | End: 2019-09-12

## 2019-09-12 RX ADMIN — AMLODIPINE BESYLATE 10 MG: 10 TABLET ORAL at 05:13

## 2019-09-12 RX ADMIN — HEPARIN SODIUM 5000 UNITS: 5000 INJECTION INTRAVENOUS; SUBCUTANEOUS at 15:12

## 2019-09-12 RX ADMIN — HEPARIN SODIUM 5000 UNITS: 5000 INJECTION INTRAVENOUS; SUBCUTANEOUS at 05:13

## 2019-09-12 RX ADMIN — SENNOSIDES, DOCUSATE SODIUM 2 TABLET: 50; 8.6 TABLET, FILM COATED ORAL at 05:13

## 2019-09-12 RX ADMIN — POTASSIUM CHLORIDE 40 MEQ: 20 TABLET, EXTENDED RELEASE ORAL at 05:13

## 2019-09-12 RX ADMIN — HEPARIN SODIUM 5000 UNITS: 5000 INJECTION INTRAVENOUS; SUBCUTANEOUS at 21:08

## 2019-09-12 RX ADMIN — CITALOPRAM HYDROBROMIDE 40 MG: 40 TABLET ORAL at 05:13

## 2019-09-12 RX ADMIN — POTASSIUM CHLORIDE 20 MEQ: 20 TABLET, EXTENDED RELEASE ORAL at 08:08

## 2019-09-12 RX ADMIN — OMEPRAZOLE 20 MG: 20 CAPSULE, DELAYED RELEASE ORAL at 05:13

## 2019-09-12 ASSESSMENT — COGNITIVE AND FUNCTIONAL STATUS - GENERAL
WALKING IN HOSPITAL ROOM: A LOT
DAILY ACTIVITIY SCORE: 13
HELP NEEDED FOR BATHING: A LOT
PERSONAL GROOMING: A LITTLE
MOVING FROM LYING ON BACK TO SITTING ON SIDE OF FLAT BED: UNABLE
STANDING UP FROM CHAIR USING ARMS: A LOT
SUGGESTED CMS G CODE MODIFIER MOBILITY: CM
MOVING TO AND FROM BED TO CHAIR: UNABLE
DRESSING REGULAR UPPER BODY CLOTHING: A LOT
SUGGESTED CMS G CODE MODIFIER DAILY ACTIVITY: CL
MOBILITY SCORE: 8
DRESSING REGULAR LOWER BODY CLOTHING: TOTAL
CLIMB 3 TO 5 STEPS WITH RAILING: TOTAL
EATING MEALS: A LITTLE
TURNING FROM BACK TO SIDE WHILE IN FLAT BAD: UNABLE
TOILETING: A LOT

## 2019-09-12 ASSESSMENT — GAIT ASSESSMENTS: GAIT LEVEL OF ASSIST: UNABLE TO PARTICIPATE

## 2019-09-12 ASSESSMENT — ACTIVITIES OF DAILY LIVING (ADL): TOILETING: INDEPENDENT

## 2019-09-12 NOTE — PROGRESS NOTES
Assumed care at 1900, bedside report received from day shift RN. Initial assessment completed, orders reviewed, call light within reach, and hourly rounding in place. POC addressed with patient, no additional questions at this time.

## 2019-09-12 NOTE — ASSESSMENT & PLAN NOTE
CT head negative.  No neurologic abnormalities detected on exam.  Labs are largely unremarkable.  Per the granddaughter, she gets like this when she is in the hospital.  Attempt to mimic a normal circadian rhythm.  Avoid excessive pain and sedating medications.  Reorient frequently.  Continue to monitor.  UA positive for mild infection. Started 5 day course of macrobid on 9/13/19.

## 2019-09-12 NOTE — THERAPY
Physical Therapy Contact Note    Off floor to CT upon attempt, will return when able;     Shelbie Lozano, PT , DPT Pager: [phone]

## 2019-09-12 NOTE — PROGRESS NOTES
Patient is comfortable in bed. Is looking forward to working with PT/OT to figure out the next steps. Family is questioning IRF for patient.

## 2019-09-12 NOTE — PROGRESS NOTES
Hospital Medicine Daily Progress Note    Date of Service  9/12/2019    Chief Complaint  Acute on chronic right hip and low back pain    Hospital Course   Ms. Bishop is an 84-year-old female who was brought to the emergency department on 9/10/2019 after she attempted to catch her daughter who was falling and ended up landing on her butt.  Afterwards she was unable to walk due to worsening of her chronic right hip and low back pain.  An x-ray was performed and showed left superior and inferior pubic rami fractures. Orthopedic surgery was consulted and recommended a CT of her pelvis without contrast, which showed mildly displaced left inferior and superior pubic rami fractures with a small amount of adjacent hemorrhage in addition to a minimally displaced right sacral alar fracture.       Interval Problem Update  Very confused today.  Alert and oriented to self only.  No neurologic deficits on exam.  Stat CT scan obtained and was negative for acute abnormalities.  Granddaughter came to bedside a couple hours afterwards and states that the patient always gets like this when she is in the hospital.    H/H is stable from yesterday.  Potassium up to 3.3, will give additional repletion.  Mag 1.8.    T-max 99.7 °F, HR 80s, SBP teens-120s, O2 saturations 91-93% on room air.    PT/OT evaluations are pending.    Consultants/Specialty  Orthopedic surgery-Dr. Valenzuela    Code Status  Full code    Disposition  Preemptive SNF referral placed today.    Review of Systems  Review of Systems   Unable to perform ROS: Mental status change      Physical Exam  Temp:  [36.3 °C (97.3 °F)-37.6 °C (99.7 °F)] 36.3 °C (97.3 °F)  Pulse:  [76-87] 87  Resp:  [16-18] 18  BP: (114-141)/(52-78) 141/78  SpO2:  [91 %-93 %] 91 %    Physical Exam   Constitutional: She is active and cooperative. She does not appear ill. No distress.   Thin.  Currently on room air.   HENT:   Head: Normocephalic and atraumatic.   Mouth/Throat: Abnormal dentition.   Eyes:  Pupils are equal, round, and reactive to light. Conjunctivae and EOM are normal. Right eye exhibits no discharge. Left eye exhibits no discharge. No scleral icterus.   Neck: Normal range of motion and phonation normal. Neck supple.   Cardiovascular: Normal rate, regular rhythm, normal heart sounds and intact distal pulses. Exam reveals no gallop and no friction rub.   No murmur heard.  Pulmonary/Chest: Effort normal and breath sounds normal. No accessory muscle usage or stridor. No respiratory distress. She has no decreased breath sounds. She has no wheezes. She has no rhonchi. She has no rales.   Abdominal: Soft. Bowel sounds are normal. She exhibits no distension and no abdominal bruit. There is no tenderness. There is no rigidity and no guarding.   Musculoskeletal: She exhibits no edema.        Right hip: She exhibits decreased range of motion and decreased strength.   Neurological: She is alert. She is disoriented. No cranial nerve deficit or sensory deficit. She exhibits abnormal muscle tone. Gait (Secondary to fracture) abnormal. Coordination normal. GCS eye subscore is 4. GCS verbal subscore is 4. GCS motor subscore is 6.   No evidence of neurologic compromise   Skin: Skin is warm and dry. No rash noted. She is not diaphoretic. No erythema. There is pallor.   Psychiatric: She has a normal mood and affect. Her speech is normal and behavior is normal. Cognition and memory are impaired. She expresses impulsivity. She exhibits abnormal recent memory and abnormal remote memory.   Nursing note and vitals reviewed.    Fluids    Intake/Output Summary (Last 24 hours) at 9/12/2019 1302  Last data filed at 9/12/2019 1000  Gross per 24 hour   Intake 440 ml   Output no documentation   Net 440 ml     Laboratory  Recent Labs     09/11/19  0151 09/12/19  0239   WBC 8.5 9.4   RBC 3.87* 3.74*   HEMOGLOBIN 10.5* 10.2*   HEMATOCRIT 34.1* 32.6*   MCV 88.1 87.2   MCH 27.1 27.3   MCHC 30.8* 31.3*   RDW 58.4* 56.7*   PLATELETCT 308  279   MPV 9.7 10.0     Recent Labs     09/11/19  0151 09/12/19  0239   SODIUM 139 138   POTASSIUM 3.0* 3.3*   CHLORIDE 104 105   CO2 27 26   GLUCOSE 107* 118*   BUN 11 11   CREATININE 0.90 0.82   CALCIUM 8.9 8.8     Imaging  CT-HEAD W/O   Final Result         1.  No acute intracranial findings. No evidence of acute hemorrhage or mass lesion.      2. Cerebral atrophy.      3.  White matter lucencies most consistent with chronic small vessel ischemic change.               DX-PELVIS-TRAUMA SERIES  3-   Final Result      1.  Stable acute left superior and inferior pubic rami fractures.   2.  Old right femur fracture status post ORIF.      CT-PELVIS W/O PLUS RECONS   Final Result   Addendum 1 of 1      9/10/2019 5:03 PM      HISTORY/REASON FOR EXAM:  superior inferior rami fractures seen on left,    ct for more eval of the pelvis; Pain/Deformity Following Trauma; superior    inferior rami fractures seen on left, ct for more eval of the pelvis.         TECHNIQUE/ EXAM DESCRIPTION AND NUMBER OF VIEWS:  CT scan of the    pelvis/hip without contrast and including reconstructions.      Thin-section helical images were obtained from the iliac crests through    the lesser trochanters with contrast. Coronal reconstructions were    generated from the axial images.      Up to date radiation dose reduction adjustments have been utilized to meet    ALARA standards for radiation dose reduction.      COMPARISON: LEFT hip radiographs 9/10/2019      FINDINGS:   Degenerative change of lower lumbar spine.   Minimally displaced RIGHT sacral alar fracture.  Mild displaced fractures    of the LEFT inferior and superior pubic rami.   Postoperative change of RIGHT proximal femur.  Visualized proximal femurs    are intact and normally located.   Vascular calcifications.   Small amount of hemorrhage in the anterior pelvis adjacent pubic    symphysis.   Colonic diverticulosis.         1.  LEFT inferior and superior pubic ramus fractures, mildly  displaced,    with small amount of adjacent hemorrhage.   2.  Minimally displaced RIGHT sacral alar fracture.   3.  No hip fracture or dislocation.   4.  Postoperative change of RIGHT proximal femur.      Final      DX-HIP-COMPLETE - UNILATERAL 2+ LEFT   Final Result   Addendum 1 of 1      9/10/2019 2:55 PM      HISTORY/REASON FOR EXAM:  Pelvic/Hip Pain Following Trauma.   Low back and hip pain following a fall      TECHNIQUE/EXAM DESCRIPTION AND NUMBER OF VIEWS:  2 views of the LEFT hip.      COMPARISON: 10/19/2018      FINDINGS:   Prior repair of the right femur is again noted. There is hypertrophic bony    change projecting from the greater and lesser trochanters. There are    fractures in the left superior and inferior pubic rami. The bones are    osteopenic.      There are phleboliths in the pelvis. There are scattered arterial    calcifications.      Degenerative changes are in the lower lumbar spine.         1.  Left superior and inferior pubic rami fractures, minimally displaced.      2.  Osteopenia      3.  Status post ORIF of the right femur, partially visualized.      Final      DX-LUMBAR SPINE-2 OR 3 VIEWS   Final Result   Addendum 1 of 1      9/10/2019 2:56 PM      HISTORY/REASON FOR EXAM:  Pain Following Trauma   Low back pain following a fall      TECHNIQUE/ EXAM DESCRIPTION AND NUMBER OF VIEWS:  2 views of the lumbar    spine.      COMPARISON: None.      FINDINGS:   There is levoscoliotic curvature.  No acute fracture.    There is    osteopenia. There is osteophytic spurring from the endplates of the lumbar    spine with moderate to severe multilevel disc space narrowing. There is    severe facet arthropathy in the lower    lumbar spine. There is calcification in the aorta. SI joints are    unremarkable.            1.  Levoscoliosis.      2.  No acute fractures identified.      3.  Multilevel degenerative disc disease and facet arthropathy.      4.  Osteopenia.      Final         Assessment/Plan  *  Pelvic fracture (HCC)- (present on admission)  Assessment & Plan  Evaluated by orthopedic surgery.  Nonoperative.  WBAT RLE, 25% PWB LLE.   Continue DVT prophylaxis.  Continue fall precautions.  Continue pain control.  Awaiting PT/OT evaluation.  Preemptive SNF order placed today.    Acute respiratory failure with hypoxia (HCC)- (present on admission)  Assessment & Plan  History of pulmonary hypertension.  Does not wear oxygen at home.  Is now doing well on room air.  Continue to encourage incentive spirometry.    Delirium  Assessment & Plan  CT had negative.  No neurologic abnormalities detected on exam.  Labs are largely unremarkable.  Per the granddaughter, she gets like this when she is in the hospital.  Attempt to mimic a normal circadian rhythm.  Avoid excessive pain and sedating medications.  Reorient frequently.  Continue to monitor.    Hypokalemia- (present on admission)  Assessment & Plan  Additional replacement ordered this morning.  Will recheck BMP tomorrow.    Normocytic anemia- (present on admission)  Assessment & Plan  H/H appears stable from prior.  Trend PRN.    History of CVA- (present on admission)  Assessment & Plan  In 2014.  No obvious residual deficits.    Ambulates with a walker at baseline.  PT/OT evaluation pending.    Essential hypertension- (present on admission)  Assessment & Plan  SBP overnight teens-120s.  Continue home amlodipine and trend per unit protocol.     VTE prophylaxis: Subcutaneous heparin    -----------------------------------------------------------------------------------------------------------------------------------------------------  Electronically signed by:  Palma Gonzalez, MSN, RN, APRN, ACNPC-AG, CCRN  Nurse Practitioner  [facility]  9/12/2019    1:02 PM

## 2019-09-12 NOTE — THERAPY
"Occupational Therapy Evaluation completed.   Functional Status:  Total A LB dressing, Min A seated grooming, SPV seated grooming, Mod A supine>sit, mod A sit>stand  Plan of Care: Will benefit from Occupational Therapy 3 times per week  Discharge Recommendations:  Equipment: Will Continue to Assess for Equipment Needs. Post-acute therapy Recommend post-acute placement for additional occupational therapy services prior to discharge home. Patient can tolerate post-acute therapies at a 5x/week frequency.    See \"Rehab Therapy-Acute\" Patient Summary Report for complete documentation.      Pt is a 85 y/o female who presents to acute following a GLF resulting in a R sacral fx, left SIPR fx. Now WBAT on R LE, and 25% PWB on L LE. Pt reports at baseline she lives w/ dtr and receives assist w/ bathing, occasionally dressing, and IADLs. Pt primarily limited by anticipation of pain throughout session. Pt appeared to be confused, however was easily redirected to tasks. Pt demo'd impaired balance, functional mobility, activity tolerance and generalized weakness in B UE's impacting functional independence. Will continue to follow for Acute OT services. Recommend post acute placement.   "

## 2019-09-12 NOTE — THERAPY
"Physical Therapy Evaluation completed.   Bed Mobility:  Supine to Sit: Moderate Assist(of 2, assisting with LEs; raised HOB)  Transfers: Sit to Stand: Minimal Assist(of 2, mod to maintain upright and WB precautiosn )  Gait: Level Of Assist: Unable to Participate   Plan of Care: Will benefit from Physical Therapy 4 times per week  Discharge Recommendations: Equipment: Will Continue to Assess for Equipment Needs.     Pt presents with impaired motor control, strength, balance and gait mechancis s/p GLF sustaining R scaral fx, left SIPR fx now WBAT R LE, 25% WB L LE. Pt is most limited by fear of pain and falling though very stoic and paricipatory, did well with redirection but some confusion noted. In current condition would recommend placement, will follow.     See \"Rehab Therapy-Acute\" Patient Summary Report for complete documentation.     "

## 2019-09-12 NOTE — PROGRESS NOTES
Patient is disoriented today, head CT performed for acute mental status change. Patient has been seen by PT/OT, is a max assist x2 out of bed at this time, currently sitting comfortably in a wheelchair by the nurses station.

## 2019-09-13 ENCOUNTER — APPOINTMENT (OUTPATIENT)
Dept: RADIOLOGY | Facility: MEDICAL CENTER | Age: 84
DRG: 535 | End: 2019-09-13
Attending: INTERNAL MEDICINE
Payer: MEDICARE

## 2019-09-13 VITALS
BODY MASS INDEX: 20.34 KG/M2 | HEART RATE: 75 BPM | RESPIRATION RATE: 17 BRPM | DIASTOLIC BLOOD PRESSURE: 59 MMHG | TEMPERATURE: 98.1 F | WEIGHT: 103.62 LBS | HEIGHT: 60 IN | SYSTOLIC BLOOD PRESSURE: 119 MMHG | OXYGEN SATURATION: 90 %

## 2019-09-13 PROBLEM — J96.01 ACUTE RESPIRATORY FAILURE WITH HYPOXIA (HCC): Status: RESOLVED | Noted: 2019-05-07 | Resolved: 2019-09-13

## 2019-09-13 PROBLEM — E87.6 HYPOKALEMIA: Status: RESOLVED | Noted: 2017-06-02 | Resolved: 2019-09-13

## 2019-09-13 PROBLEM — S62.101A RIGHT WRIST FRACTURE, CLOSED, INITIAL ENCOUNTER: Status: ACTIVE | Noted: 2019-09-13

## 2019-09-13 LAB
ANION GAP SERPL CALC-SCNC: 10 MMOL/L (ref 0–11.9)
APPEARANCE UR: CLEAR
BACTERIA #/AREA URNS HPF: ABNORMAL /HPF
BASOPHILS # BLD AUTO: 0.5 % (ref 0–1.8)
BASOPHILS # BLD: 0.05 K/UL (ref 0–0.12)
BILIRUB UR QL STRIP.AUTO: NEGATIVE
BUN SERPL-MCNC: 10 MG/DL (ref 8–22)
CALCIUM SERPL-MCNC: 8.8 MG/DL (ref 8.5–10.5)
CHLORIDE SERPL-SCNC: 105 MMOL/L (ref 96–112)
CO2 SERPL-SCNC: 25 MMOL/L (ref 20–33)
COLOR UR: YELLOW
CREAT SERPL-MCNC: 0.72 MG/DL (ref 0.5–1.4)
EOSINOPHIL # BLD AUTO: 0.11 K/UL (ref 0–0.51)
EOSINOPHIL NFR BLD: 1.1 % (ref 0–6.9)
EPI CELLS #/AREA URNS HPF: NEGATIVE /HPF
ERYTHROCYTE [DISTWIDTH] IN BLOOD BY AUTOMATED COUNT: 55.8 FL (ref 35.9–50)
GLUCOSE SERPL-MCNC: 104 MG/DL (ref 65–99)
GLUCOSE UR STRIP.AUTO-MCNC: NEGATIVE MG/DL
HCT VFR BLD AUTO: 30.2 % (ref 37–47)
HGB BLD-MCNC: 9.5 G/DL (ref 12–16)
HYALINE CASTS #/AREA URNS LPF: ABNORMAL /LPF
IMM GRANULOCYTES # BLD AUTO: 0.04 K/UL (ref 0–0.11)
IMM GRANULOCYTES NFR BLD AUTO: 0.4 % (ref 0–0.9)
KETONES UR STRIP.AUTO-MCNC: ABNORMAL MG/DL
LEUKOCYTE ESTERASE UR QL STRIP.AUTO: ABNORMAL
LYMPHOCYTES # BLD AUTO: 1.73 K/UL (ref 1–4.8)
LYMPHOCYTES NFR BLD: 17.1 % (ref 22–41)
MCH RBC QN AUTO: 27.2 PG (ref 27–33)
MCHC RBC AUTO-ENTMCNC: 31.5 G/DL (ref 33.6–35)
MCV RBC AUTO: 86.5 FL (ref 81.4–97.8)
MICRO URNS: ABNORMAL
MONOCYTES # BLD AUTO: 1.26 K/UL (ref 0–0.85)
MONOCYTES NFR BLD AUTO: 12.5 % (ref 0–13.4)
NEUTROPHILS # BLD AUTO: 6.92 K/UL (ref 2–7.15)
NEUTROPHILS NFR BLD: 68.4 % (ref 44–72)
NITRITE UR QL STRIP.AUTO: NEGATIVE
NRBC # BLD AUTO: 0 K/UL
NRBC BLD-RTO: 0 /100 WBC
PH UR STRIP.AUTO: 6 [PH] (ref 5–8)
PLATELET # BLD AUTO: 256 K/UL (ref 164–446)
PMV BLD AUTO: 9.8 FL (ref 9–12.9)
POTASSIUM SERPL-SCNC: 3.8 MMOL/L (ref 3.6–5.5)
PROT UR QL STRIP: NEGATIVE MG/DL
RBC # BLD AUTO: 3.49 M/UL (ref 4.2–5.4)
RBC # URNS HPF: ABNORMAL /HPF
RBC UR QL AUTO: NEGATIVE
SODIUM SERPL-SCNC: 140 MMOL/L (ref 135–145)
SP GR UR STRIP.AUTO: 1.02
UROBILINOGEN UR STRIP.AUTO-MCNC: 1 MG/DL
WBC # BLD AUTO: 10.1 K/UL (ref 4.8–10.8)
WBC #/AREA URNS HPF: ABNORMAL /HPF

## 2019-09-13 PROCEDURE — E0191 PROTECTOR HEEL OR ELBOW: HCPCS | Performed by: INTERNAL MEDICINE

## 2019-09-13 PROCEDURE — 700111 HCHG RX REV CODE 636 W/ 250 OVERRIDE (IP): Performed by: INTERNAL MEDICINE

## 2019-09-13 PROCEDURE — 87186 SC STD MICRODIL/AGAR DIL: CPT

## 2019-09-13 PROCEDURE — 80048 BASIC METABOLIC PNL TOTAL CA: CPT

## 2019-09-13 PROCEDURE — 87077 CULTURE AEROBIC IDENTIFY: CPT

## 2019-09-13 PROCEDURE — 87086 URINE CULTURE/COLONY COUNT: CPT

## 2019-09-13 PROCEDURE — 36415 COLL VENOUS BLD VENIPUNCTURE: CPT

## 2019-09-13 PROCEDURE — 73100 X-RAY EXAM OF WRIST: CPT | Mod: RT

## 2019-09-13 PROCEDURE — 81001 URINALYSIS AUTO W/SCOPE: CPT

## 2019-09-13 PROCEDURE — 700102 HCHG RX REV CODE 250 W/ 637 OVERRIDE(OP): Performed by: INTERNAL MEDICINE

## 2019-09-13 PROCEDURE — 99239 HOSP IP/OBS DSCHRG MGMT >30: CPT | Performed by: INTERNAL MEDICINE

## 2019-09-13 PROCEDURE — A9270 NON-COVERED ITEM OR SERVICE: HCPCS | Performed by: NURSE PRACTITIONER

## 2019-09-13 PROCEDURE — 700102 HCHG RX REV CODE 250 W/ 637 OVERRIDE(OP): Performed by: NURSE PRACTITIONER

## 2019-09-13 PROCEDURE — A9270 NON-COVERED ITEM OR SERVICE: HCPCS | Performed by: INTERNAL MEDICINE

## 2019-09-13 PROCEDURE — 85025 COMPLETE CBC W/AUTO DIFF WBC: CPT

## 2019-09-13 RX ORDER — POLYETHYLENE GLYCOL 3350 17 G/17G
17 POWDER, FOR SOLUTION ORAL DAILY
Start: 2019-09-13 | End: 2020-08-08

## 2019-09-13 RX ORDER — NITROFURANTOIN 25; 75 MG/1; MG/1
100 CAPSULE ORAL 2 TIMES DAILY
Refills: 0
Start: 2019-09-13 | End: 2019-09-18

## 2019-09-13 RX ORDER — NITROFURANTOIN 25; 75 MG/1; MG/1
100 CAPSULE ORAL 2 TIMES DAILY WITH MEALS
Status: DISCONTINUED | OUTPATIENT
Start: 2019-09-13 | End: 2019-09-13 | Stop reason: HOSPADM

## 2019-09-13 RX ORDER — POTASSIUM CHLORIDE 20 MEQ/1
40 TABLET, EXTENDED RELEASE ORAL DAILY
Qty: 60 TAB | Refills: 0
Start: 2019-09-14 | End: 2020-08-08

## 2019-09-13 RX ORDER — OXYCODONE HYDROCHLORIDE 5 MG/1
2.5-5 TABLET ORAL EVERY 4 HOURS PRN
Qty: 18 TAB | Refills: 0 | Status: SHIPPED | OUTPATIENT
Start: 2019-09-13 | End: 2019-09-16

## 2019-09-13 RX ADMIN — CITALOPRAM HYDROBROMIDE 40 MG: 40 TABLET ORAL at 05:44

## 2019-09-13 RX ADMIN — POTASSIUM CHLORIDE 40 MEQ: 20 TABLET, EXTENDED RELEASE ORAL at 05:44

## 2019-09-13 RX ADMIN — ACETAMINOPHEN 650 MG: 325 TABLET, FILM COATED ORAL at 08:35

## 2019-09-13 RX ADMIN — HEPARIN SODIUM 5000 UNITS: 5000 INJECTION INTRAVENOUS; SUBCUTANEOUS at 13:20

## 2019-09-13 RX ADMIN — HEPARIN SODIUM 5000 UNITS: 5000 INJECTION INTRAVENOUS; SUBCUTANEOUS at 05:44

## 2019-09-13 RX ADMIN — NITROFURANTOIN MONOHYDRATE/MACROCRYSTALLINE 100 MG: 25; 75 CAPSULE ORAL at 13:20

## 2019-09-13 RX ADMIN — OMEPRAZOLE 20 MG: 20 CAPSULE, DELAYED RELEASE ORAL at 05:44

## 2019-09-13 RX ADMIN — AMLODIPINE BESYLATE 10 MG: 10 TABLET ORAL at 05:44

## 2019-09-13 NOTE — PROGRESS NOTES
Clean catch UA via purewick sent to lab due to altered mental status, starting on Macrobid. XR of right wrist taken due to increased pain, splint applied to right wrist. Patient is ordered for discharge to [facility] with transport  at 1500.

## 2019-09-13 NOTE — PROGRESS NOTES
Patient is AOx1, only oriented to self. Reoriented patient. Patient's grandson ans his wife at bedside. Family is concerned due to patient's confusion and asking if patient's potassium or blood levels could be low. Educated family on patient's labs and need to collect urine sample to test for UTI. Family verbalized understanding.

## 2019-09-13 NOTE — DISCHARGE PLANNING
Care Transition Team Assessment      This RN CM met with patient at bedside for this assessment. Pt verified accuracy of Face Sheet. Pt has her daughter, Payton Naik as her next of kin. Her emergency contact is [phone]. Pt lives in a one story house with daughter and son -in law. Pt has a Primary Care Physician. Pt has no AD and refused an AD booklet. Pt denies any Financial Concern at this time. Pt denies any history of SA and MH Disorder. Pt. needs some help with some ADLs and most IADLs prior to this hospitalization.     Information Source  Orientation : Oriented x 4  Information Given By: Patient  Informant's Name: Rebecca Bishop  Who is responsible for making decisions for patient? : Patient    Readmission Evaluation  Is this a readmission?: No    Elopement Risk  Legal Hold: No  Ambulatory or Self Mobile in Wheelchair: No-Not an Elopement Risk  Elopement Risk: Not at Risk for Elopement    Interdisciplinary Discharge Planning  Primary Care Physician: Duane Carlson  Lives with - Patient's Self Care Capacity: Adult Children  Patient or legal guardian wants to designate a caregiver (see row info): No  Support Systems: Family Member(s), Other (Comments)(Daughter, Son In law, Grandson)  Housing / Facility: 1 Story House  Do You Take your Prescribed Medications Regularly: Yes  Able to Return to Previous ADL's: Future Time w/Therapy  Mobility Issues: Yes  Prior Services: None, Other (Comments)(Skilled Nursing Facility)  Assistance Needed: Yes  Durable Medical Equipment: Walker    Discharge Preparedness  What is your plan after discharge?: Skilled nursing facility  What are your discharge supports?: Child, Other (comment)(Son In law, Grandson)  Prior Functional Level: Ambulatory, Needs Assist with Activities of Daily Living, Needs Assist with Medication Management, Uses Walker  Difficulity with ADLs: None  Difficulity with IADLs: Driving, Cooking, Laundry, Shopping, Using the telephone or computer, Managing  medication    Functional Assesment  Prior Functional Level: Ambulatory, Needs Assist with Activities of Daily Living, Needs Assist with Medication Management, Uses Walker    Finances  Financial Barriers to Discharge: No  Prescription Coverage: Yes    Vision / Hearing Impairment  Vision Impairment : No  Hearing Impairment : No     Advance Directive  Advance Directive?: None  Advance Directive offered?: AD Booklet refused    Domestic Abuse  Have you ever been the victim of abuse or violence?: No  Physical Abuse or Sexual Abuse: No  Verbal Abuse or Emotional Abuse: No  Possible Abuse Reported to:: Not Applicable    Psychological Assessment  History of Substance Abuse: None  History of Psychiatric Problems: No  Non-compliant with Treatment: No  Newly Diagnosed Illness: Yes    Discharge Risks or Barriers  Discharge risks or barriers?: No    Anticipated Discharge Information  Anticipated discharge disposition: SNF  Discharge Contact Phone Number: [phone](Esteban Naik)

## 2019-09-13 NOTE — DISCHARGE PLANNING
Care Transition Team Discharge Planning    Anticipates discharge disposition:  • To Skilled Nursing Facility    Action:  · This RN CM met with patient at bedside to assess her needs for discharge  · This RN CM obtained verbal consent from patient to SNF  · This RN CM met with  Patient's grandson , Catrachito Naik at the bedside to give him resources for possible long term placement after SNF. Catrachito verbalized that his Mom might not be able to take care of patient in the long run.     Barriers to Discharge:  · Pending acceptance from SNF  · Transportation    Plan:  • Will update Care Team  • Will continue to provide service as needed

## 2019-09-13 NOTE — PROGRESS NOTES
Patient has been discharged to [facility] transported by [facility]. AVS was reviewed with the patient and her granddaughter, and has been signed by both. A copy of the [facility] employee Edvin's photo ID is in the chart. All questions of the patient have been answered. Report has given given to the receiving nurse at [facility].

## 2019-09-13 NOTE — DISCHARGE SUMMARY
Discharge Summary    CHIEF COMPLAINT ON ADMISSION  Chief Complaint   Patient presents with   • Fall     Patient attempted to catch daughter and landed on buttocks from ground. Arrives with worsening pain of her chronic right hip pain and low back pain. Pt reports unable to walk after accident. Paramedics assisted patient into walker chair.      Reason for Admission  Hip pain following fall    CODE STATUS  Full Code    HPI & HOSPITAL COURSE  Ms. Bishop is an 84-year-old female who was brought to the emergency department on 9/10/2019 after she attempted to catch her daughter who was falling and ended up landing on her butt.  Afterwards she was unable to walk due to worsening of her chronic hip and low back pain.  An x-ray was performed and showed left superior and inferior pubic rami fractures. Orthopedic surgery was consulted and recommended a CT of her pelvis without contrast, which showed mildly displaced left inferior and superior pubic rami fractures with a small amount of adjacent hemorrhage in addition to a minimally displaced right sacral alar fracture. Orthopedic surgery deemed them non-operative a provided weight bearing restrictions as listed below.  She was evaluated by PT and OT who are recommending SNF placement prior to discharge home.  She has been accepted to see her [facility], though on the day of discharge she was complaining of right wrist pain.  A x-ray was obtained and showed an impacted distal radius fracture and nondisplaced distal ulnar fracture.  Orthopedic surgery was then contacted and recommended a velcro wrist splint and follow up in their office in 1 week for casting.  Of note, the day after admission she became pleasantly confused.  A CT of her head was done and was negative for acute abnormalities.  According to her granddaughter, she gets like this when she has to stay in the hospital.  A urinalysis was done for completion purposes and showed evidence of mild UTI.  She has  subsequently been placed on a 5-day course of oral macrobid pending culture results.  Her vital signs and lab work have remained stable and she is medically clear for transfer to SNF today.    Therefore, she is discharged in good and stable condition to skilled nursing facility.    The patient met 2-midnight criteria for an inpatient stay at the time of discharge.    FOLLOW UP ITEMS POST DISCHARGE  PCP within 1-2 weeks after discharge from SNF.  Orthopedic surgery in 1 week for casting of her right wrist.    DISCHARGE DIAGNOSES  Principal Problem:    Pelvic fracture (HCC) POA: Yes  Active Problems:    Urinary tract infection without hematuria POA: Clinically Undetermined    Delirium POA: No    Essential hypertension POA: Yes    History of CVA POA: Yes    Normocytic anemia (Chronic) POA: Yes    Right wrist fracture, closed, initial encounter POA: Yes  Resolved Problems:    Acute respiratory failure with hypoxia (HCC) POA: Yes    Hypokalemia POA: Yes    FOLLOW UP  Reddy Valenzuela M.D.  [address] [facility] [address]  [phone]      9/23    MEDICATIONS ON DISCHARGE     Medication List      Start taking these medications      Instructions   nitrofurantoin monohyd macro 100 MG Caps  Commonly known as:  MACROBID   Take 1 Cap by mouth 2 times a day for 5 days.  Dose:  100 mg     oxyCODONE immediate-release 5 MG Tabs  Commonly known as:  ROXICODONE   Take 0.5-1 Tabs by mouth every four hours as needed for up to 3 days.  Dose:  2.5-5 mg     polyethylene glycol/lytes Pack  Commonly known as:  MIRALAX   Doctor's comments:  Take while on narcotics  Take 1 Packet by mouth every day.  Dose:  17 g     potassium chloride SA 20 MEQ Tbcr  Start taking on:  9/14/2019  Commonly known as:  Kdur   Take 2 Tabs by mouth every day.  Dose:  40 mEq        Continue taking these medications      Instructions   amLODIPine 10 MG Tabs  Commonly known as:  NORVASC   Take 10 mg by mouth every day. Indications: High Blood Pressure  Dose:  10  mg     citalopram 40 MG Tabs  Commonly known as:  CELEXA   Take 40 mg by mouth every day.  Dose:  40 mg     ibuprofen 200 MG Tabs  Commonly known as:  MOTRIN   Take 200 mg by mouth 1 time daily as needed for Mild Pain.  Dose:  200 mg     omeprazole 20 MG delayed-release capsule  Commonly known as:  PRILOSEC   Take 1 Cap by mouth every day.  Dose:  20 mg          Allergies  No Known Allergies    DIET  Orders Placed This Encounter   Procedures   • Diet Order Regular     Standing Status:   Standing     Number of Occurrences:   1     Order Specific Question:   Diet:     Answer:   Regular [1]     Order Specific Question:   Texture/Fiber modifications:     Answer:   Dysphagia 3(Mechanical Soft)specify fluid consistency(question 6) [3]     ACTIVITY  As tolerated and directed by skilled nursing.  WBAT RLE.  25% PWB LLE.    LINES, DRAINS, AND WOUNDS  This is an automated list. Peripheral IVs will be removed prior to discharge.                   MENTAL STATUS ON TRANSFER  Level of Consciousness: Confused  Orientation : Oriented x 4  Speech: Speech Clear    CONSULTATIONS  Orthopedic surgery    PROCEDURES  None    LABORATORY  Lab Results   Component Value Date    SODIUM 140 09/13/2019    POTASSIUM 3.8 09/13/2019    CHLORIDE 105 09/13/2019    CO2 25 09/13/2019    GLUCOSE 104 (H) 09/13/2019    BUN 10 09/13/2019    CREATININE 0.72 09/13/2019    CREATININE 0.8 04/06/2006      Lab Results   Component Value Date    WBC 10.1 09/13/2019    HEMOGLOBIN 9.5 (L) 09/13/2019    HEMATOCRIT 30.2 (L) 09/13/2019    PLATELETCT 256 09/13/2019      Assessment and plan:    * Pelvic fracture (HCC)- (present on admission)  Assessment & Plan  Evaluated by orthopedic surgery.  Nonoperative.  WBAT RLE, 25% PWB LLE.   Continue DVT prophylaxis.  Continue fall precautions.  Continue pain control.  PT/OT recommending SNF placement prior to home.    Delirium  Assessment & Plan  CT head negative.  No neurologic abnormalities detected on exam.  Labs are largely  unremarkable.  Per the granddaughter, she gets like this when she is in the hospital.  Attempt to mimic a normal circadian rhythm.  Avoid excessive pain and sedating medications.  Reorient frequently.  Continue to monitor.  UA positive for mild infection. Started 5 day course of macrobid on 9/13/19.     Urinary tract infection without hematuria  Assessment & Plan  Started 5 day course of macrobid on 9/13/2019.  Urine culture pending.    Right wrist fracture, closed, initial encounter- (present on admission)  Assessment & Plan  Called ortho Dr. Luna who recommended a Velcro wrist splint that we placed prior to discharge, and follow-up in their office in 1 week for casting.  Continue pain control.    Normocytic anemia- (present on admission)  Assessment & Plan  H/H appears stable from prior.  Continue to trend PRN at [facility].    History of CVA- (present on admission)  Assessment & Plan  In 2014.  No obvious residual deficits.    Ambulates with a walker at baseline.  PT/OT evaluation pending.    Essential hypertension- (present on admission)  Assessment & Plan  Well-controlled on current regimen.  Continue amlodipine.    Total time of the discharge process exceeds 36 minutes.    -----------------------------------------------------------------------------------------------------------------------------------------------------  Electronically signed by:  Palma Gonzalez, MSN, RN, APRN, ACNPC-AG, CCRN  Nurse Practitioner  [facility]  9/13/2019    1:20 PM

## 2019-09-13 NOTE — CARE PLAN
Problem: Safety  Goal: Will remain free from injury  Outcome: PROGRESSING AS EXPECTED     Problem: Infection  Goal: Will remain free from infection  Outcome: PROGRESSING AS EXPECTED     Problem: Bowel/Gastric:  Goal: Normal bowel function is maintained or improved  Outcome: PROGRESSING AS EXPECTED     Problem: Psychosocial Needs:  Goal: Level of anxiety will decrease  Outcome: PROGRESSING AS EXPECTED

## 2019-09-13 NOTE — CARE PLAN
Problem: Safety  Goal: Will remain free from injury  Outcome: PROGRESSING AS EXPECTED  Educated patient about fall prevention strategies. Educated patient to call and wait for staff assistance before attempting to ambulate. Verbalized understanding. Call light in use, belongings within reach, patient in room near nursing station, treaded socks on, bed locked in low position      Problem: Skin Integrity  Goal: Risk for impaired skin integrity will decrease  Outcome: PROGRESSING AS EXPECTED  Pillows for positioning, frequent checks for incontinence and linen changes, andrés cream,  heel float boots ordered, waffle overlay ordered, encouraged good oral intake

## 2019-09-13 NOTE — PROGRESS NOTES
Pt refuse to back to bed . Nurse aware, pt is clean and dry sitting in the wheelchair near nurses station.

## 2019-09-13 NOTE — DISCHARGE PLANNING
Received Transport Form at 1100  Spoke to Tom at [facility]  Transport is scheduled for today at 1500 going to [facility].

## 2019-09-13 NOTE — DISCHARGE INSTRUCTIONS
Discharge Instructions    Discharged to other by medical transportation with escort. Discharged via ambulance, hospital escort: Yes.  Special equipment needed: Not Applicable    Be sure to schedule a follow-up appointment with your primary care doctor or any specialists as instructed.     Discharge Plan:   Diet Plan: Discussed  Activity Level: Discussed  Confirmed Follow up Appointment: Patient to Call and Schedule Appointment  Confirmed Symptoms Management: Discussed  Medication Reconciliation Updated: Yes  Influenza Vaccine Indication: Indicated: Not available from distributor/    I understand that a diet low in cholesterol, fat, and sodium is recommended for good health. Unless I have been given specific instructions below for another diet, I accept this instruction as my diet prescription.   Other diet: Regular Dysphagia II, thin liquids      Depression / Suicide Risk    [facility], it is important to learn how to keep safe from harming yourself.    Recognize the warning signs:  · Abrupt changes in personality, positive or negative- including increase in energy   · Giving away possessions  · Change in eating patterns- significant weight changes-  positive or negative  · Change in sleeping patterns- unable to sleep or sleeping all the time   · Unwillingness or inability to communicate  · Depression  · Unusual sadness, discouragement and loneliness  · Talk of wanting to die  · Neglect of personal appearance   · Rebelliousness- reckless behavior  · Withdrawal from people/activities they love  · Confusion- inability to concentrate     If you or a loved one observes any of these behaviors or has concerns about self-harm, here's what you can do:  · Talk about it- your feelings and reasons for harming yourself  · Remove any means that you might use to hurt yourself (examples: pills, rope, extension cords, firearm)  · Get professional help from the community  (Mental Health, Substance Abuse, psychological counseling)  · Do not be alone:Call your Safe Contact- someone whom you trust who will be there for you.  · Call your local CRISIS HOTLINE 980-9061 or 874-460-1168  · Call your local Children's Mobile Crisis Response Team [place] (175) 527-0898 or www.DocDep  · Call the toll free National Suicide Prevention Hotlines   · National Suicide Prevention Lifeline 745-609-ESVY (8448)  · National Hope Line Network 800-SUICIDE (863-0557)

## 2019-09-13 NOTE — ASSESSMENT & PLAN NOTE
Called ortho Dr. Luna who recommended a Velcro wrist splint that we placed prior to discharge, and follow-up in their office in 1 week for casting.  Continue pain control.

## 2019-09-15 LAB
BACTERIA UR CULT: ABNORMAL
BACTERIA UR CULT: ABNORMAL
SIGNIFICANT IND 70042: ABNORMAL
SITE SITE: ABNORMAL
SOURCE SOURCE: ABNORMAL

## 2019-09-16 NOTE — CONSULTS
DATE OF SERVICE:  09/11/2019    CHIEF COMPLAINT:  Pelvic pain.    HISTORY OF PRESENT ILLNESS:  The patient is 84 years old who had a ground   level fall.  She had increased pain in her hip and groin.  She was found to   have pelvic fractures.  Orthopedic consultation was requested.  She had no   loss of consciousness.    ALLERGIES:  None.    MEDICATIONS:  Amlodipine, citalopram, ibuprofen, and omeprazole.    PAST MEDICAL HISTORY:  GERD, hypertension, TIA/stroke, and breast cancer.    PAST SURGICAL HISTORY:  1.  Right ankle ORIF.  2.  Right hip nailing.    SOCIAL HISTORY:  The patient does not smoke.  She does drink alcohol, but does   not use any drugs.    FAMILY HISTORY:  Positive for cancer in mother and sister.    REVIEW OF SYSTEMS:  No loss of consciousness, nausea, vomiting, diarrhea,   constipation, polyuria, dysuria, fevers, chills, weight loss, weight gain,   abdominal pain, chest pain or shortness of breath.    PHYSICAL EXAMINATION:  GENERAL:  The patient is in no acute distress.  VITAL SIGNS:  Her blood pressure on presentation was 146/63, heart rate 76,   respirations 16, temperature 97.7.  HEENT:  Normocephalic, atraumatic.  NECK:  Supple, nontender.  CHEST AND ABDOMEN:  Nontender.  No labored breathing.  EXTREMITIES:  The lower extremities and upper extremities are without   tenderness or deformity.  She has some tenderness over her left pubic area.    No instability with lateral compression applied to the iliac crest.    LABORATORY DATA:  Include white blood cell count of 8500, hematocrit 34.1%,   platelet count 304,000.  Sodium 139, potassium 3.0, creatinine 0.90.  AST and   ALT are normal.  Albumin is 3.4.    DIAGNOSTIC DATA:  Radiographs show left superior and inferior pubic rami   fractures, which are nondisplaced.    A CT scan of the pelvis shows the left rami fractures as well as a right   sacral ala fracture.    ASSESSMENT:  Stable pelvic ring injury.    PLAN:  Recommended nonoperative  treatment.  She can be weightbearing as   tolerated.  We will get baseline inlet, outlet radiographs of the pelvis.  We   will see her back in the clinic in approximately 2 weeks for repeat   radiographs to confirm no progressive pelvic deformity.       ____________________________________     MD ANYA SAVAGE / ROXANA    DD:  09/16/2019 13:51:50  DT:  09/16/2019 14:00:58    D#:  [id number]  Job#:  [id number]
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
## 2020-02-28 ENCOUNTER — HOSPITAL ENCOUNTER (EMERGENCY)
Facility: MEDICAL CENTER | Age: 85
End: 2020-02-28
Attending: EMERGENCY MEDICINE
Payer: MEDICARE

## 2020-02-28 ENCOUNTER — APPOINTMENT (OUTPATIENT)
Dept: RADIOLOGY | Facility: MEDICAL CENTER | Age: 85
End: 2020-02-28
Attending: EMERGENCY MEDICINE
Payer: MEDICARE

## 2020-02-28 VITALS
TEMPERATURE: 96.6 F | DIASTOLIC BLOOD PRESSURE: 81 MMHG | BODY MASS INDEX: 19.83 KG/M2 | HEIGHT: 61 IN | RESPIRATION RATE: 16 BRPM | OXYGEN SATURATION: 93 % | WEIGHT: 105 LBS | HEART RATE: 74 BPM | SYSTOLIC BLOOD PRESSURE: 126 MMHG

## 2020-02-28 DIAGNOSIS — S52.501A CLOSED FRACTURE OF DISTAL END OF RIGHT RADIUS, UNSPECIFIED FRACTURE MORPHOLOGY, INITIAL ENCOUNTER: ICD-10-CM

## 2020-02-28 PROCEDURE — 73130 X-RAY EXAM OF HAND: CPT | Mod: RT

## 2020-02-28 PROCEDURE — 99284 EMERGENCY DEPT VISIT MOD MDM: CPT

## 2020-02-28 ASSESSMENT — LIFESTYLE VARIABLES: DO YOU DRINK ALCOHOL: NO

## 2020-02-28 NOTE — ED NOTES
Discharge instructions reviewed with patient and signed. He verbalized her follow up instructions. Her daughter is taking her home. She has her belongings with her and walks steadily with her cane

## 2020-02-28 NOTE — ED TRIAGE NOTES
Pt arrived via ems    Chief Complaint   Patient presents with   • Numbness     intemittent numbness on r side, from hip to calf started yesterday. states started about 2 yrs ago from bad fall, denies pain currently.      Pt oriented to ed. Placed in gown and given call light.

## 2020-02-28 NOTE — ED PROVIDER NOTES
ED Provider Note    Scribed for Michael Cevallos D.O. by Bryant Washington. 2/28/2020  9:23 AM    Primary care provider: Duane Patel D.O.  Means of arrival: EMS  History obtained from: patient  History limited by: none    CHIEF COMPLAINT  Chief Complaint   Patient presents with   • Numbness     intemittent numbness on r side, from hip to calf started yesterday. states started about 2 yrs ago from bad fall, denies pain currently.        HPI  Rebecca Bishop is a 84 y.o. female who presents to the Emergency Department complaining of acutely worsening right hand pain starting yesterday. Patient reports associated intermittent numbness, subjective fever. She reports a history of intermittent numbness in her right arm status post ground level fall sustained several months ago. Patient states that her hand pain came on again yesterday and was more severe than previous episodes, prompting her to call EMS. Patient denies recent fall, headache, vision changes, acute neck or back pain, history of arthritis.      REVIEW OF SYSTEMS  Pertinent positives include hand pain, numbness, fever. Pertinent negatives include no recent fall, headache, vision changes, acute neck or back pain.  All other systems reviewed and negative.    PAST MEDICAL HISTORY  Past Medical History:   Diagnosis Date   • Hypertension    • Stroke (HCC)    • TIA (transient ischemic attack) 1/2015       SURGICAL HISTORY  Past Surgical History:   Procedure Laterality Date   • GASTROSCOPY-ENDO N/A 5/12/2019    Procedure: GASTROSCOPY;  Surgeon: Anastacia Smith M.D.;  Location: ENDOSCOPY [facility];  Service: Gastroenterology   • COLONOSCOPY - ENDO N/A 5/12/2019    Procedure: COLONOSCOPY;  Surgeon: Anastacia Smith M.D.;  Location: ENDOSCOPY [facility];  Service: Gastroenterology   • HIP NAILING INTRAMEDULLARY Right 10/10/2018    Procedure: HIP NAILING INTRAMEDULLARY;  Surgeon: Ricardo Luna M.D.;  Location: SURGERY [facility];   "Service: Orthopedics   • ANKLE ORIF Right 6/1/2017    Procedure: ANKLE ORIF;  Surgeon: Ricardo Luna M.D.;  Location: SURGERY [facility];  Service:         SOCIAL HISTORY  Social History     Tobacco Use   • Smoking status: Never Smoker   • Smokeless tobacco: Never Used   Substance Use Topics   • Alcohol use: Not Currently     Comment: SOCIAL   • Drug use: No      Social History     Substance and Sexual Activity   Drug Use No       FAMILY HISTORY  Family History   Problem Relation Age of Onset   • Cancer Mother         breast cancer   • Cancer Sister         breast cancer       CURRENT MEDICATIONS  Home Medications     Reviewed by Leonel Reynaga R.N. (Registered Nurse) on 02/28/20 at 0907  Med List Status: Not Addressed   Medication Last Dose Status   amlodipine (NORVASC) 10 MG Tab  Active   citalopram (CELEXA) 40 MG Tab  Active   ibuprofen (MOTRIN) 200 MG Tab  Active   omeprazole (PRILOSEC) 20 MG delayed-release capsule  Active   polyethylene glycol/lytes (MIRALAX) Pack  Active   potassium chloride SA (KDUR) 20 MEQ Tab CR  Active                ALLERGIES  No Known Allergies    PHYSICAL EXAM  VITAL SIGNS: /76   Pulse 76   Temp 35.9 °C (96.6 °F) (Temporal)   Resp 16   Ht 1.549 m (5' 1\")   Wt 47.6 kg (105 lb)   SpO2 95%   BMI 19.84 kg/m²     Nursing notes and vitals reviewed.  Constitutional: Well developed, Well nourished, No acute distress, Non-toxic appearance.   Eyes: PERRLA, EOMI, Conjunctiva normal, No discharge.    Skin: Warm, Dry, No erythema, No rash.   Musculoskeletal: Intact distal pulses, No edema, No cyanosis, No clubbing. Good range of motion in all major joints. No major deformities noted, Negative Tinel's sign. No forearm or upper arm tenderness. Tenderness in to the ulnar distribution. 2nd, 3rd, 4th metacarpals with tenderness in the right hand. Distal cap refill is normal. Distal sensation and strength intact radial,medial, and ulnar nerves.   Neurologic: Alert & oriented x 3, " "Normal motor function, Normal sensory function, No focal deficits noted.      DIAGNOSTIC STUDIES/PROCEDURES    RADIOLOGY  DX-HAND 3+ RIGHT   Final Result      Slightly impacted nondisplaced fracture of the distal radius      Soft tissue swelling.      Degenerative changes.      Demineralization.      Mild subluxation of the distal interphalangeal joint of the third digit.         The radiologist's interpretation of all radiological studies have been reviewed by me.    COURSE & MEDICAL DECISION MAKING  Pertinent Labs & Imaging studies reviewed. (See chart for details)    9:23 AM - Patient seen and examined at bedside. Based on symptoms and exam, I suspect arthritic changes. Discussed obtaining radiology scan for acute abnormalities. Patient verbalizes understanding and agreement to this plan of care. Ordered DX hand to evaluate her symptoms.     12:22 PM - Recheck: Patient re-evaluated at beside. Patient's diagnostic results discussed. Discussed patient's condition and treatment plan. Patient will be discharged with instructions and provided with strict return precautions. Instructed to return to Emergency Department immediately if any new or worsening symptoms.    Discharge vitals: /69   Pulse 83   Temp 35.9 °C (96.6 °F) (Temporal)   Resp 16   Ht 1.549 m (5' 1\")   Wt 47.6 kg (105 lb)   SpO2 94%   BMI 19.84 kg/m²     This is a charming 84 y.o. female that presents with right distal radius fracture.  I was talking the patient's daughter and she states that she was diagnosed with a fracture September 2019 and was placed in a small Velcro splint.  The patient is no longer using the splint..  There is evidence of fracture and I do not believe is acute.  She has no evidence of significant deformity, she has no significant neurological vascular deficits.  I encouraged the patient to put her splint back on to be following with Dr. antonio for further evaluation and management.  The patient's daughter is at " bedside understands the plan and agrees with the plan.    The patient will return for new or worsening symptoms and is stable at the time of discharge.    The patient is referred to a primary physician for blood pressure management, diabetic screening, and for all other preventative health concerns.    DISPOSITION:  Patient will be discharged home in stable condition.    FOLLOW UP:  [facility], Emergency Dept  [address]  [place] [id number] [phone]    If symptoms worsen    Marvin Martines M.D.  [address] [facility] [address]  [phone]    Schedule an appointment as soon as possible for a visit in 1 week      FINAL IMPRESSION  1. Closed fracture of distal end of right radius, unspecified fracture morphology, initial encounter Active         Bryant PHAN (Scribe), am scribing for, and in the presence of, Michael Cevallos D.O    Electronically signed by: Bryant Washington (Scribe), 2/28/2020    IMichael D.O. personally performed the services described in this documentation, as scribed by Bryant Washington in my presence, and it is both accurate and complete. C    The note accurately reflects work and decisions made by me.  Michael Cevallos D.O.  2/28/2020  2:56 PM

## 2020-03-04 ENCOUNTER — HOSPITAL ENCOUNTER (OUTPATIENT)
Dept: LAB | Facility: MEDICAL CENTER | Age: 85
End: 2020-03-04
Attending: FAMILY MEDICINE
Payer: MEDICARE

## 2020-03-04 PROCEDURE — 87086 URINE CULTURE/COLONY COUNT: CPT

## 2020-03-04 PROCEDURE — 87077 CULTURE AEROBIC IDENTIFY: CPT

## 2020-03-04 PROCEDURE — 87186 SC STD MICRODIL/AGAR DIL: CPT

## 2020-05-01 ENCOUNTER — HOSPITAL ENCOUNTER (OUTPATIENT)
Facility: MEDICAL CENTER | Age: 85
End: 2020-05-01
Attending: FAMILY MEDICINE
Payer: MEDICARE

## 2020-05-01 LAB
APPEARANCE UR: CLEAR
BACTERIA #/AREA URNS HPF: NEGATIVE /HPF
BILIRUB UR QL STRIP.AUTO: NEGATIVE
COLOR UR: YELLOW
EPI CELLS #/AREA URNS HPF: NEGATIVE /HPF
GLUCOSE UR STRIP.AUTO-MCNC: NEGATIVE MG/DL
HYALINE CASTS #/AREA URNS LPF: NORMAL /LPF
KETONES UR STRIP.AUTO-MCNC: NEGATIVE MG/DL
LEUKOCYTE ESTERASE UR QL STRIP.AUTO: ABNORMAL
MICRO URNS: ABNORMAL
NITRITE UR QL STRIP.AUTO: NEGATIVE
PH UR STRIP.AUTO: 6.5 [PH] (ref 5–8)
PROT UR QL STRIP: NEGATIVE MG/DL
RBC # URNS HPF: NORMAL /HPF
RBC UR QL AUTO: NEGATIVE
SP GR UR STRIP.AUTO: 1.01
UROBILINOGEN UR STRIP.AUTO-MCNC: 0.2 MG/DL
WBC #/AREA URNS HPF: NORMAL /HPF

## 2020-05-01 PROCEDURE — 87077 CULTURE AEROBIC IDENTIFY: CPT

## 2020-05-01 PROCEDURE — 87186 SC STD MICRODIL/AGAR DIL: CPT

## 2020-05-01 PROCEDURE — 87086 URINE CULTURE/COLONY COUNT: CPT

## 2020-05-01 PROCEDURE — 81001 URINALYSIS AUTO W/SCOPE: CPT

## 2020-08-08 ENCOUNTER — APPOINTMENT (OUTPATIENT)
Dept: RADIOLOGY | Facility: MEDICAL CENTER | Age: 85
DRG: 854 | End: 2020-08-08
Attending: EMERGENCY MEDICINE
Payer: MEDICARE

## 2020-08-08 ENCOUNTER — ANESTHESIA EVENT (OUTPATIENT)
Dept: SURGERY | Facility: MEDICAL CENTER | Age: 85
DRG: 854 | End: 2020-08-08
Payer: MEDICARE

## 2020-08-08 ENCOUNTER — ANESTHESIA (OUTPATIENT)
Dept: SURGERY | Facility: MEDICAL CENTER | Age: 85
DRG: 854 | End: 2020-08-08
Payer: MEDICARE

## 2020-08-08 ENCOUNTER — HOSPITAL ENCOUNTER (INPATIENT)
Facility: MEDICAL CENTER | Age: 85
LOS: 4 days | DRG: 854 | End: 2020-08-12
Attending: EMERGENCY MEDICINE | Admitting: HOSPITALIST
Payer: MEDICARE

## 2020-08-08 ENCOUNTER — APPOINTMENT (OUTPATIENT)
Dept: RADIOLOGY | Facility: MEDICAL CENTER | Age: 85
DRG: 854 | End: 2020-08-08
Attending: HOSPITALIST
Payer: MEDICARE

## 2020-08-08 DIAGNOSIS — R41.82 ALTERED MENTAL STATUS, UNSPECIFIED ALTERED MENTAL STATUS TYPE: ICD-10-CM

## 2020-08-08 DIAGNOSIS — R29.898 LEFT ARM WEAKNESS: ICD-10-CM

## 2020-08-08 DIAGNOSIS — M27.2 MANDIBULAR ABSCESS: ICD-10-CM

## 2020-08-08 DIAGNOSIS — L03.211 FACIAL CELLULITIS: ICD-10-CM

## 2020-08-08 DIAGNOSIS — R40.20 LOSS OF CONSCIOUSNESS (HCC): ICD-10-CM

## 2020-08-08 PROBLEM — A41.9 SEPSIS (HCC): Status: ACTIVE | Noted: 2020-08-08

## 2020-08-08 LAB
ABO GROUP BLD: NORMAL
ALBUMIN SERPL BCP-MCNC: 3.8 G/DL (ref 3.2–4.9)
ALBUMIN/GLOB SERPL: 1.1 G/DL
ALP SERPL-CCNC: 93 U/L (ref 30–99)
ALT SERPL-CCNC: 7 U/L (ref 2–50)
ANION GAP SERPL CALC-SCNC: 17 MMOL/L (ref 7–16)
APTT PPP: 35.9 SEC (ref 24.7–36)
AST SERPL-CCNC: 9 U/L (ref 12–45)
BASOPHILS # BLD AUTO: 0.7 % (ref 0–1.8)
BASOPHILS # BLD: 0.13 K/UL (ref 0–0.12)
BILIRUB SERPL-MCNC: 0.7 MG/DL (ref 0.1–1.5)
BLD GP AB SCN SERPL QL: NORMAL
BUN SERPL-MCNC: 26 MG/DL (ref 8–22)
CALCIUM SERPL-MCNC: 9.2 MG/DL (ref 8.5–10.5)
CHLORIDE SERPL-SCNC: 101 MMOL/L (ref 96–112)
CO2 SERPL-SCNC: 20 MMOL/L (ref 20–33)
COVID ORDER STATUS COVID19: NORMAL
CREAT SERPL-MCNC: 1.04 MG/DL (ref 0.5–1.4)
EKG IMPRESSION: NORMAL
EOSINOPHIL # BLD AUTO: 0.04 K/UL (ref 0–0.51)
EOSINOPHIL NFR BLD: 0.2 % (ref 0–6.9)
ERYTHROCYTE [DISTWIDTH] IN BLOOD BY AUTOMATED COUNT: 49.3 FL (ref 35.9–50)
GLOBULIN SER CALC-MCNC: 3.4 G/DL (ref 1.9–3.5)
GLUCOSE BLD-MCNC: 180 MG/DL (ref 65–99)
GLUCOSE SERPL-MCNC: 204 MG/DL (ref 65–99)
HCT VFR BLD AUTO: 36.1 % (ref 37–47)
HGB BLD-MCNC: 11.4 G/DL (ref 12–16)
IMM GRANULOCYTES # BLD AUTO: 0.07 K/UL (ref 0–0.11)
IMM GRANULOCYTES NFR BLD AUTO: 0.4 % (ref 0–0.9)
INR PPP: 1.06 (ref 0.87–1.13)
LACTATE BLD-SCNC: 1.4 MMOL/L (ref 0.5–2)
LYMPHOCYTES # BLD AUTO: 1.22 K/UL (ref 1–4.8)
LYMPHOCYTES NFR BLD: 6.9 % (ref 22–41)
MCH RBC QN AUTO: 26.9 PG (ref 27–33)
MCHC RBC AUTO-ENTMCNC: 31.6 G/DL (ref 33.6–35)
MCV RBC AUTO: 85.1 FL (ref 81.4–97.8)
MONOCYTES # BLD AUTO: 1.81 K/UL (ref 0–0.85)
MONOCYTES NFR BLD AUTO: 10.3 % (ref 0–13.4)
NEUTROPHILS # BLD AUTO: 14.32 K/UL (ref 2–7.15)
NEUTROPHILS NFR BLD: 81.5 % (ref 44–72)
NRBC # BLD AUTO: 0 K/UL
NRBC BLD-RTO: 0 /100 WBC
PLATELET # BLD AUTO: 360 K/UL (ref 164–446)
PMV BLD AUTO: 9.7 FL (ref 9–12.9)
POTASSIUM SERPL-SCNC: 4 MMOL/L (ref 3.6–5.5)
PROT SERPL-MCNC: 7.2 G/DL (ref 6–8.2)
PROTHROMBIN TIME: 14.2 SEC (ref 12–14.6)
RBC # BLD AUTO: 4.24 M/UL (ref 4.2–5.4)
RH BLD: NORMAL
SARS-COV-2 RNA RESP QL NAA+PROBE: NOTDETECTED
SODIUM SERPL-SCNC: 138 MMOL/L (ref 135–145)
SPECIMEN SOURCE: NORMAL
TROPONIN T SERPL-MCNC: 14 NG/L (ref 6–19)
WBC # BLD AUTO: 17.6 K/UL (ref 4.8–10.8)

## 2020-08-08 PROCEDURE — 700102 HCHG RX REV CODE 250 W/ 637 OVERRIDE(OP): Performed by: HOSPITALIST

## 2020-08-08 PROCEDURE — 700105 HCHG RX REV CODE 258: Performed by: ANESTHESIOLOGY

## 2020-08-08 PROCEDURE — A9270 NON-COVERED ITEM OR SERVICE: HCPCS | Performed by: HOSPITALIST

## 2020-08-08 PROCEDURE — 84484 ASSAY OF TROPONIN QUANT: CPT

## 2020-08-08 PROCEDURE — 770020 HCHG ROOM/CARE - TELE (206)

## 2020-08-08 PROCEDURE — 700111 HCHG RX REV CODE 636 W/ 250 OVERRIDE (IP): Performed by: EMERGENCY MEDICINE

## 2020-08-08 PROCEDURE — 71045 X-RAY EXAM CHEST 1 VIEW: CPT

## 2020-08-08 PROCEDURE — 86901 BLOOD TYPING SEROLOGIC RH(D): CPT

## 2020-08-08 PROCEDURE — 99223 1ST HOSP IP/OBS HIGH 75: CPT | Mod: AI | Performed by: HOSPITALIST

## 2020-08-08 PROCEDURE — 99285 EMERGENCY DEPT VISIT HI MDM: CPT

## 2020-08-08 PROCEDURE — 500380 HCHG DRAIN, PENROSE 1/4X12: Performed by: ORAL & MAXILLOFACIAL SURGERY

## 2020-08-08 PROCEDURE — 83605 ASSAY OF LACTIC ACID: CPT

## 2020-08-08 PROCEDURE — 700101 HCHG RX REV CODE 250: Performed by: ORAL & MAXILLOFACIAL SURGERY

## 2020-08-08 PROCEDURE — 700105 HCHG RX REV CODE 258: Performed by: HOSPITALIST

## 2020-08-08 PROCEDURE — 770006 HCHG ROOM/CARE - MED/SURG/GYN SEMI*

## 2020-08-08 PROCEDURE — 99223 1ST HOSP IP/OBS HIGH 75: CPT | Performed by: NURSE PRACTITIONER

## 2020-08-08 PROCEDURE — 700105 HCHG RX REV CODE 258: Performed by: EMERGENCY MEDICINE

## 2020-08-08 PROCEDURE — 700102 HCHG RX REV CODE 250 W/ 637 OVERRIDE(OP): Performed by: EMERGENCY MEDICINE

## 2020-08-08 PROCEDURE — 700117 HCHG RX CONTRAST REV CODE 255: Performed by: EMERGENCY MEDICINE

## 2020-08-08 PROCEDURE — U0003 INFECTIOUS AGENT DETECTION BY NUCLEIC ACID (DNA OR RNA); SEVERE ACUTE RESPIRATORY SYNDROME CORONAVIRUS 2 (SARS-COV-2) (CORONAVIRUS DISEASE [COVID-19]), AMPLIFIED PROBE TECHNIQUE, MAKING USE OF HIGH THROUGHPUT TECHNOLOGIES AS DESCRIBED BY CMS-2020-01-R: HCPCS

## 2020-08-08 PROCEDURE — 70450 CT HEAD/BRAIN W/O DYE: CPT | Mod: MG

## 2020-08-08 PROCEDURE — 500754 HCHG JAW BRA: Performed by: ORAL & MAXILLOFACIAL SURGERY

## 2020-08-08 PROCEDURE — C9803 HOPD COVID-19 SPEC COLLECT: HCPCS | Performed by: HOSPITALIST

## 2020-08-08 PROCEDURE — 160038 HCHG SURGERY MINUTES - EA ADDL 1 MIN LEVEL 2: Performed by: ORAL & MAXILLOFACIAL SURGERY

## 2020-08-08 PROCEDURE — 70496 CT ANGIOGRAPHY HEAD: CPT | Mod: MG

## 2020-08-08 PROCEDURE — 93005 ELECTROCARDIOGRAM TRACING: CPT | Performed by: EMERGENCY MEDICINE

## 2020-08-08 PROCEDURE — 87040 BLOOD CULTURE FOR BACTERIA: CPT

## 2020-08-08 PROCEDURE — 700111 HCHG RX REV CODE 636 W/ 250 OVERRIDE (IP): Performed by: HOSPITALIST

## 2020-08-08 PROCEDURE — 501838 HCHG SUTURE GENERAL: Performed by: ORAL & MAXILLOFACIAL SURGERY

## 2020-08-08 PROCEDURE — 85610 PROTHROMBIN TIME: CPT

## 2020-08-08 PROCEDURE — 0042T CT-CEREBRAL PERFUSION ANALYSIS: CPT | Mod: MG

## 2020-08-08 PROCEDURE — 160009 HCHG ANES TIME/MIN: Performed by: ORAL & MAXILLOFACIAL SURGERY

## 2020-08-08 PROCEDURE — 160048 HCHG OR STATISTICAL LEVEL 1-5: Performed by: ORAL & MAXILLOFACIAL SURGERY

## 2020-08-08 PROCEDURE — 700111 HCHG RX REV CODE 636 W/ 250 OVERRIDE (IP): Performed by: ANESTHESIOLOGY

## 2020-08-08 PROCEDURE — 70498 CT ANGIOGRAPHY NECK: CPT | Mod: MG

## 2020-08-08 PROCEDURE — 85025 COMPLETE CBC W/AUTO DIFF WBC: CPT

## 2020-08-08 PROCEDURE — 160027 HCHG SURGERY MINUTES - 1ST 30 MINS LEVEL 2: Performed by: ORAL & MAXILLOFACIAL SURGERY

## 2020-08-08 PROCEDURE — 86900 BLOOD TYPING SEROLOGIC ABO: CPT

## 2020-08-08 PROCEDURE — 70355 PANORAMIC X-RAY OF JAWS: CPT

## 2020-08-08 PROCEDURE — 86850 RBC ANTIBODY SCREEN: CPT

## 2020-08-08 PROCEDURE — 94760 N-INVAS EAR/PLS OXIMETRY 1: CPT

## 2020-08-08 PROCEDURE — 96365 THER/PROPH/DIAG IV INF INIT: CPT

## 2020-08-08 PROCEDURE — A6403 STERILE GAUZE>16 <= 48 SQ IN: HCPCS | Performed by: ORAL & MAXILLOFACIAL SURGERY

## 2020-08-08 PROCEDURE — 700101 HCHG RX REV CODE 250: Performed by: ANESTHESIOLOGY

## 2020-08-08 PROCEDURE — 160035 HCHG PACU - 1ST 60 MINS PHASE I: Performed by: ORAL & MAXILLOFACIAL SURGERY

## 2020-08-08 PROCEDURE — 82962 GLUCOSE BLOOD TEST: CPT

## 2020-08-08 PROCEDURE — A9270 NON-COVERED ITEM OR SERVICE: HCPCS | Performed by: EMERGENCY MEDICINE

## 2020-08-08 PROCEDURE — 500445 HCHG HEMOSTAT, SURGICEL 4X8: Performed by: ORAL & MAXILLOFACIAL SURGERY

## 2020-08-08 PROCEDURE — 160002 HCHG RECOVERY MINUTES (STAT): Performed by: ORAL & MAXILLOFACIAL SURGERY

## 2020-08-08 PROCEDURE — 80053 COMPREHEN METABOLIC PANEL: CPT

## 2020-08-08 PROCEDURE — 85730 THROMBOPLASTIN TIME PARTIAL: CPT

## 2020-08-08 RX ORDER — ONDANSETRON 4 MG/1
4 TABLET, ORALLY DISINTEGRATING ORAL EVERY 4 HOURS PRN
Status: DISCONTINUED | OUTPATIENT
Start: 2020-08-08 | End: 2020-08-12 | Stop reason: HOSPADM

## 2020-08-08 RX ORDER — AMLODIPINE BESYLATE 5 MG/1
10 TABLET ORAL DAILY
Status: DISCONTINUED | OUTPATIENT
Start: 2020-08-08 | End: 2020-08-12 | Stop reason: HOSPADM

## 2020-08-08 RX ORDER — CITALOPRAM 20 MG/1
40 TABLET ORAL DAILY
Status: DISCONTINUED | OUTPATIENT
Start: 2020-08-08 | End: 2020-08-12 | Stop reason: HOSPADM

## 2020-08-08 RX ORDER — AMOXICILLIN 250 MG
2 CAPSULE ORAL 2 TIMES DAILY
Status: DISCONTINUED | OUTPATIENT
Start: 2020-08-08 | End: 2020-08-12 | Stop reason: HOSPADM

## 2020-08-08 RX ORDER — ONDANSETRON 2 MG/ML
4 INJECTION INTRAMUSCULAR; INTRAVENOUS EVERY 4 HOURS PRN
Status: DISCONTINUED | OUTPATIENT
Start: 2020-08-08 | End: 2020-08-12 | Stop reason: HOSPADM

## 2020-08-08 RX ORDER — ONDANSETRON 2 MG/ML
INJECTION INTRAMUSCULAR; INTRAVENOUS PRN
Status: DISCONTINUED | OUTPATIENT
Start: 2020-08-08 | End: 2020-08-08 | Stop reason: SURG

## 2020-08-08 RX ORDER — BISACODYL 10 MG
10 SUPPOSITORY, RECTAL RECTAL
Status: DISCONTINUED | OUTPATIENT
Start: 2020-08-08 | End: 2020-08-12 | Stop reason: HOSPADM

## 2020-08-08 RX ORDER — DIPHENHYDRAMINE HYDROCHLORIDE 50 MG/ML
12.5 INJECTION INTRAMUSCULAR; INTRAVENOUS
Status: DISCONTINUED | OUTPATIENT
Start: 2020-08-08 | End: 2020-08-08 | Stop reason: HOSPADM

## 2020-08-08 RX ORDER — ACETAMINOPHEN 325 MG/1
650 TABLET ORAL EVERY 6 HOURS PRN
Status: DISCONTINUED | OUTPATIENT
Start: 2020-08-08 | End: 2020-08-12 | Stop reason: HOSPADM

## 2020-08-08 RX ORDER — ONDANSETRON 2 MG/ML
4 INJECTION INTRAMUSCULAR; INTRAVENOUS
Status: DISCONTINUED | OUTPATIENT
Start: 2020-08-08 | End: 2020-08-08 | Stop reason: HOSPADM

## 2020-08-08 RX ORDER — DEXAMETHASONE SODIUM PHOSPHATE 4 MG/ML
INJECTION, SOLUTION INTRA-ARTICULAR; INTRALESIONAL; INTRAMUSCULAR; INTRAVENOUS; SOFT TISSUE PRN
Status: DISCONTINUED | OUTPATIENT
Start: 2020-08-08 | End: 2020-08-08 | Stop reason: SURG

## 2020-08-08 RX ORDER — SODIUM CHLORIDE, SODIUM LACTATE, POTASSIUM CHLORIDE, CALCIUM CHLORIDE 600; 310; 30; 20 MG/100ML; MG/100ML; MG/100ML; MG/100ML
INJECTION, SOLUTION INTRAVENOUS CONTINUOUS
Status: DISCONTINUED | OUTPATIENT
Start: 2020-08-08 | End: 2020-08-10

## 2020-08-08 RX ORDER — LIDOCAINE HYDROCHLORIDE AND EPINEPHRINE 10; 10 MG/ML; UG/ML
INJECTION, SOLUTION INFILTRATION; PERINEURAL
Status: DISCONTINUED | OUTPATIENT
Start: 2020-08-08 | End: 2020-08-08 | Stop reason: HOSPADM

## 2020-08-08 RX ORDER — HALOPERIDOL 5 MG/ML
1 INJECTION INTRAMUSCULAR
Status: DISCONTINUED | OUTPATIENT
Start: 2020-08-08 | End: 2020-08-08 | Stop reason: HOSPADM

## 2020-08-08 RX ORDER — CEFAZOLIN SODIUM 1 G/3ML
INJECTION, POWDER, FOR SOLUTION INTRAMUSCULAR; INTRAVENOUS PRN
Status: DISCONTINUED | OUTPATIENT
Start: 2020-08-08 | End: 2020-08-08 | Stop reason: SURG

## 2020-08-08 RX ORDER — SODIUM CHLORIDE, SODIUM LACTATE, POTASSIUM CHLORIDE, CALCIUM CHLORIDE 600; 310; 30; 20 MG/100ML; MG/100ML; MG/100ML; MG/100ML
INJECTION, SOLUTION INTRAVENOUS CONTINUOUS
Status: DISCONTINUED | OUTPATIENT
Start: 2020-08-08 | End: 2020-08-08 | Stop reason: HOSPADM

## 2020-08-08 RX ORDER — POLYETHYLENE GLYCOL 3350 17 G/17G
1 POWDER, FOR SOLUTION ORAL
Status: DISCONTINUED | OUTPATIENT
Start: 2020-08-08 | End: 2020-08-12 | Stop reason: HOSPADM

## 2020-08-08 RX ORDER — ACETAMINOPHEN 325 MG/1
650 TABLET ORAL ONCE
Status: COMPLETED | OUTPATIENT
Start: 2020-08-08 | End: 2020-08-08

## 2020-08-08 RX ADMIN — SODIUM CHLORIDE, POTASSIUM CHLORIDE, SODIUM LACTATE AND CALCIUM CHLORIDE: 600; 310; 30; 20 INJECTION, SOLUTION INTRAVENOUS at 17:30

## 2020-08-08 RX ADMIN — AMPICILLIN SODIUM AND SULBACTAM SODIUM 1.5 G: 1; .5 INJECTION, POWDER, FOR SOLUTION INTRAMUSCULAR; INTRAVENOUS at 21:00

## 2020-08-08 RX ADMIN — SODIUM CHLORIDE, POTASSIUM CHLORIDE, SODIUM LACTATE AND CALCIUM CHLORIDE: 600; 310; 30; 20 INJECTION, SOLUTION INTRAVENOUS at 13:24

## 2020-08-08 RX ADMIN — PROPOFOL 100 MG: 10 INJECTION, EMULSION INTRAVENOUS at 17:45

## 2020-08-08 RX ADMIN — ONDANSETRON 4 MG: 2 INJECTION INTRAMUSCULAR; INTRAVENOUS at 17:30

## 2020-08-08 RX ADMIN — AMPICILLIN AND SULBACTAM 3 G: 2; 1 INJECTION, POWDER, FOR SOLUTION INTRAVENOUS at 10:56

## 2020-08-08 RX ADMIN — ACETAMINOPHEN 650 MG: 325 TABLET, FILM COATED ORAL at 10:55

## 2020-08-08 RX ADMIN — AMLODIPINE BESYLATE 10 MG: 5 TABLET ORAL at 13:32

## 2020-08-08 RX ADMIN — CEFAZOLIN 2 G: 330 INJECTION, POWDER, FOR SOLUTION INTRAMUSCULAR; INTRAVENOUS at 17:30

## 2020-08-08 RX ADMIN — IOHEXOL 40 ML: 350 INJECTION, SOLUTION INTRAVENOUS at 09:36

## 2020-08-08 RX ADMIN — SODIUM CHLORIDE, POTASSIUM CHLORIDE, SODIUM LACTATE AND CALCIUM CHLORIDE: 600; 310; 30; 20 INJECTION, SOLUTION INTRAVENOUS at 19:43

## 2020-08-08 RX ADMIN — IOHEXOL 80 ML: 350 INJECTION, SOLUTION INTRAVENOUS at 09:37

## 2020-08-08 RX ADMIN — ROCURONIUM BROMIDE 40 MG: 10 INJECTION, SOLUTION INTRAVENOUS at 17:45

## 2020-08-08 RX ADMIN — DEXAMETHASONE SODIUM PHOSPHATE 4 MG: 4 INJECTION, SOLUTION INTRA-ARTICULAR; INTRALESIONAL; INTRAMUSCULAR; INTRAVENOUS; SOFT TISSUE at 17:30

## 2020-08-08 ASSESSMENT — COGNITIVE AND FUNCTIONAL STATUS - GENERAL
DAILY ACTIVITIY SCORE: 17
CLIMB 3 TO 5 STEPS WITH RAILING: A LITTLE
DRESSING REGULAR LOWER BODY CLOTHING: A LITTLE
HELP NEEDED FOR BATHING: A LOT
MOVING FROM LYING ON BACK TO SITTING ON SIDE OF FLAT BED: A LITTLE
MOBILITY SCORE: 19
SUGGESTED CMS G CODE MODIFIER DAILY ACTIVITY: CJ
MOVING TO AND FROM BED TO CHAIR: A LITTLE
MOVING FROM LYING ON BACK TO SITTING ON SIDE OF FLAT BED: A LITTLE
SUGGESTED CMS G CODE MODIFIER DAILY ACTIVITY: CK
STANDING UP FROM CHAIR USING ARMS: A LITTLE
CLIMB 3 TO 5 STEPS WITH RAILING: A LOT
EATING MEALS: A LITTLE
DAILY ACTIVITIY SCORE: 22
SUGGESTED CMS G CODE MODIFIER MOBILITY: CK
WALKING IN HOSPITAL ROOM: A LITTLE
WALKING IN HOSPITAL ROOM: A LITTLE
MOVING TO AND FROM BED TO CHAIR: A LITTLE
MOBILITY SCORE: 18
TOILETING: A LITTLE
TOILETING: A LITTLE
STANDING UP FROM CHAIR USING ARMS: A LITTLE
DRESSING REGULAR UPPER BODY CLOTHING: A LITTLE
PERSONAL GROOMING: A LITTLE
SUGGESTED CMS G CODE MODIFIER MOBILITY: CK
PERSONAL GROOMING: A LITTLE

## 2020-08-08 ASSESSMENT — LIFESTYLE VARIABLES
ALCOHOL_USE: NO
AVERAGE NUMBER OF DAYS PER WEEK YOU HAVE A DRINK CONTAINING ALCOHOL: 0
HOW MANY TIMES IN THE PAST YEAR HAVE YOU HAD 5 OR MORE DRINKS IN A DAY: 0
HAVE PEOPLE ANNOYED YOU BY CRITICIZING YOUR DRINKING: NO
TOTAL SCORE: 0
TOTAL SCORE: 0
EVER HAD A DRINK FIRST THING IN THE MORNING TO STEADY YOUR NERVES TO GET RID OF A HANGOVER: NO
EVER_SMOKED: NEVER
TOTAL SCORE: 0
CONSUMPTION TOTAL: NEGATIVE
ON A TYPICAL DAY WHEN YOU DRINK ALCOHOL HOW MANY DRINKS DO YOU HAVE: 0
HAVE YOU EVER FELT YOU SHOULD CUT DOWN ON YOUR DRINKING: NO
DOES PATIENT WANT TO STOP DRINKING: NO
EVER FELT BAD OR GUILTY ABOUT YOUR DRINKING: NO

## 2020-08-08 ASSESSMENT — FIBROSIS 4 INDEX
FIB4 SCORE: 0.8
FIB4 SCORE: 1.180555555555555556

## 2020-08-08 ASSESSMENT — PAIN SCALES - GENERAL: PAIN_LEVEL: 1

## 2020-08-08 NOTE — ED PROVIDER NOTES
ED Provider Note    CHIEF COMPLAINT  Chief Complaint   Patient presents with   • Facial Swelling     right sided x1 week   • Syncope     en route to hospital she had episode where she lost consciousness for approx 2min. Following she is slow to respond, only verbal response was her first name with slurring. She will follow simple commands with strength deficit on left. No convulsions or abnormal movement seen by medics   • Possible Stroke     activated as stroke protocol by EMS. Onset 0905hrs       HPI  Rebecca Bishop is a 85 y.o. female who presents with complaints of facial swelling for 1 week, sudden mental status change per paramedics, and left-sided weakness.  The patient was talkative in route to the hospital, appeared to slump for 2 minutes.  She is alert, but not speaking and shows evidence of left-sided weakness according to paramedics.  She therefore presented as a stroke protocol patient, I was called promptly to see her at the charge nurse desk to initiate testing.  No known blood thinners.  Later, patient stated the problem in her jaw/face started a small area of tenderness in her mandible 2 weeks ago much worse over the past 1 week had developing redness and swelling.  She states it is difficult to chew and speak secondary to pain in her right face.  No fever.  No cough.  No chest pain    REVIEW OF SYSTEMS    Constitutional: Malaise  Respiratory: No shortness of breath  Cardiac: Possible syncopal event.  No chest pain  Gastrointestinal: No abdominal pain or vomiting  Musculoskeletal: Denies neck pain  Neurologic: No headache.  Weakness noted by paramedics transferring her  Skin: Redness to the face  ENT: Poor dentition, right jaw pain       All other systems are negative.       PAST MEDICAL HISTORY  Past Medical History:   Diagnosis Date   • Hypertension    • Stroke (HCC)    • TIA (transient ischemic attack) 1/2015       FAMILY HISTORY  Family History   Problem Relation Age of Onset   • Cancer Mother          breast cancer   • Cancer Sister         breast cancer       SOCIAL HISTORY  Social History     Socioeconomic History   • Marital status:      Spouse name: Not on file   • Number of children: Not on file   • Years of education: Not on file   • Highest education level: Not on file   Occupational History   • Not on file   Social Needs   • Financial resource strain: Not on file   • Food insecurity     Worry: Not on file     Inability: Not on file   • Transportation needs     Medical: Not on file     Non-medical: Not on file   Tobacco Use   • Smoking status: Never Smoker   • Smokeless tobacco: Never Used   Substance and Sexual Activity   • Alcohol use: Not Currently     Comment: SOCIAL   • Drug use: No   • Sexual activity: Not on file   Lifestyle   • Physical activity     Days per week: Not on file     Minutes per session: Not on file   • Stress: Not on file   Relationships   • Social connections     Talks on phone: Not on file     Gets together: Not on file     Attends Taoist service: Not on file     Active member of club or organization: Not on file     Attends meetings of clubs or organizations: Not on file     Relationship status: Not on file   • Intimate partner violence     Fear of current or ex partner: Not on file     Emotionally abused: Not on file     Physically abused: Not on file     Forced sexual activity: Not on file   Other Topics Concern   • Not on file   Social History Narrative   • Not on file       SURGICAL HISTORY  Past Surgical History:   Procedure Laterality Date   • GASTROSCOPY-ENDO N/A 5/12/2019    Procedure: GASTROSCOPY;  Surgeon: Anastacia Smith M.D.;  Location: [facility];  Service: Gastroenterology   • COLONOSCOPY - ENDO N/A 5/12/2019    Procedure: COLONOSCOPY;  Surgeon: Anastacia Smith M.D.;  Location: ENDOSCOPY [facility];  Service: Gastroenterology   • HIP NAILING INTRAMEDULLARY Right 10/10/2018    Procedure: HIP NAILING INTRAMEDULLARY;  Surgeon:  "Ricardo Luna M.D.;  Location: SURGERY [facility];  Service: Orthopedics   • ANKLE ORIF Right 6/1/2017    Procedure: ANKLE ORIF;  Surgeon: Ricardo Luna M.D.;  Location: SURGERY [facility];  Service:        CURRENT MEDICATIONS  Home Medications    **Home medications have not yet been reviewed for this encounter**         ALLERGIES  No Known Allergies    PHYSICAL EXAM  VITAL SIGNS: /62   Pulse 82   Temp 38 °C (100.4 °F) (Temporal)   Resp 18   Ht 1.549 m (5' 1\")   Wt 47.6 kg (105 lb)   SpO2 94%   BMI 19.84 kg/m²   Constitutional:  Non-toxic appearance.   HENT: Palpable tender mass adjacent to the right mandible adjacent to the right lower mid dentition.  Poor dentition noted in the mouth.  Right-sided submandibular space is tender  Eyes: Anicteric, no conjunctivitis.     Cardiovascular: Normal heart rate, Normal rhythm  Pulmonary:  No wheezing, No rales.   Gastrointestinal: Soft, No tenderness, No masses  Skin: Warm, Dry, No cyanosis.   Neurologic: Patient had drift of left arm compared to the right initially.  This is resolved while in the emergency department, currently equal strength.  Leg strength equal.  Sensation intact all extremities.  Her speech is currently clear, mildly dysarthric initially.  Patient somnolent upon arrival, exam difficult.  Psychiatric: Affect normal,  Mood normal.  Patient is calm and cooperative  Musculoskeletal: No chest wall tenderness, posterior neck nontender    EKG/Labs  Results for orders placed or performed during the hospital encounter of 08/08/20   CBC WITH DIFFERENTIAL   Result Value Ref Range    WBC 17.6 (H) 4.8 - 10.8 K/uL    RBC 4.24 4.20 - 5.40 M/uL    Hemoglobin 11.4 (L) 12.0 - 16.0 g/dL    Hematocrit 36.1 (L) 37.0 - 47.0 %    MCV 85.1 81.4 - 97.8 fL    MCH 26.9 (L) 27.0 - 33.0 pg    MCHC 31.6 (L) 33.6 - 35.0 g/dL    RDW 49.3 35.9 - 50.0 fL    Platelet Count 360 164 - 446 K/uL    MPV 9.7 9.0 - 12.9 fL    Neutrophils-Polys 81.50 (H) 44.00 - 72.00 " %    Lymphocytes 6.90 (L) 22.00 - 41.00 %    Monocytes 10.30 0.00 - 13.40 %    Eosinophils 0.20 0.00 - 6.90 %    Basophils 0.70 0.00 - 1.80 %    Immature Granulocytes 0.40 0.00 - 0.90 %    Nucleated RBC 0.00 /100 WBC    Neutrophils (Absolute) 14.32 (H) 2.00 - 7.15 K/uL    Lymphs (Absolute) 1.22 1.00 - 4.80 K/uL    Monos (Absolute) 1.81 (H) 0.00 - 0.85 K/uL    Eos (Absolute) 0.04 0.00 - 0.51 K/uL    Baso (Absolute) 0.13 (H) 0.00 - 0.12 K/uL    Immature Granulocytes (abs) 0.07 0.00 - 0.11 K/uL    NRBC (Absolute) 0.00 K/uL   COMP METABOLIC PANEL   Result Value Ref Range    Sodium 138 135 - 145 mmol/L    Potassium 4.0 3.6 - 5.5 mmol/L    Chloride 101 96 - 112 mmol/L    Co2 20 20 - 33 mmol/L    Anion Gap 17.0 (H) 7.0 - 16.0    Glucose 204 (H) 65 - 99 mg/dL    Bun 26 (H) 8 - 22 mg/dL    Creatinine 1.04 0.50 - 1.40 mg/dL    Calcium 9.2 8.5 - 10.5 mg/dL    AST(SGOT) 9 (L) 12 - 45 U/L    ALT(SGPT) 7 2 - 50 U/L    Alkaline Phosphatase 93 30 - 99 U/L    Total Bilirubin 0.7 0.1 - 1.5 mg/dL    Albumin 3.8 3.2 - 4.9 g/dL    Total Protein 7.2 6.0 - 8.2 g/dL    Globulin 3.4 1.9 - 3.5 g/dL    A-G Ratio 1.1 g/dL   PROTHROMBIN TIME   Result Value Ref Range    PT 14.2 12.0 - 14.6 sec    INR 1.06 0.87 - 1.13   APTT   Result Value Ref Range    APTT 35.9 24.7 - 36.0 sec   COD (ADULT)   Result Value Ref Range    ABO Grouping Only A     Rh Grouping Only POS     Antibody Screen-Cod NEG    TROPONIN   Result Value Ref Range    Troponin T 14 6 - 19 ng/L   ESTIMATED GFR   Result Value Ref Range    GFR If African American >60 >60 mL/min/1.73 m 2    GFR If Non African American 50 (A) >60 mL/min/1.73 m 2   LACTIC ACID   Result Value Ref Range    Lactic Acid 1.4 0.5 - 2.0 mmol/L   Routine (COVID/SARS COV-2 In-House PCR up to 24 hours)    Specimen: Nasopharyngeal; Respirate   Result Value Ref Range    COVID Order Status Received    SARS-CoV-2, PCR (In-House)   Result Value Ref Range    SARS-CoV-2 Source NP Swab    ACCU-CHEK GLUCOSE   Result Value Ref  Range    Glucose - Accu-Ck 180 (H) 65 - 99 mg/dL   EKG (NOW)   Result Value Ref Range    Report       [facility] Emergency Dept.    Test Date:  2020  Pt Name:    RIGOBERTO TAYLOR                  Department: ER  MRN:        [id number]                      Room:       RD 07  Gender:     Female                       Technician: [id number]  :        [id number]                   Requested By:MARTHA SCHAFFER  Order #:    [id number]                    Reading MD:    Measurements  Intervals                                Axis  Rate:       85                           P:          65  KS:         208                          QRS:        26  QRSD:       72                           T:          15  QT:         392  QTc:        467    Interpretive Statements  SINUS RHYTHM  BORDERLINE T WAVE ABNORMALITIES  Compared to ECG 2019 10:01:19  T-wave abnormality now present  Atrial premature complex(es) no longer present           RADIOLOGY/PROCEDURES  [id number]   Final Result      Multiple dental caries. No evidence of fracture.      DX-CHEST-PORTABLE (1 VIEW)   Final Result      No evidence of acute cardiopulmonary process.      CT-CTA NECK WITH & W/O-POST PROCESSING   Final Result   Addendum 1 of 1   Additionally seen is inflammatory change anterior and inferior to the    right mandible with some gas in that area as well as a thin layer of fluid    measuring up to 4 mm in depth likely representing early premandibular    abscess. There is some phlegmonous    change seen as well as inflammatory change in that area. There is also    periodontal disease present. There some enlarged right anterior chain    cervical lymph nodes.      Final      CT-CTA HEAD WITH & W/O-POST PROCESS   Final Result      No evidence of intracranial vascular occlusion or aneurysm.      CT-CEREBRAL PERFUSION ANALYSIS   Final Result      1.  Cerebral blood flow less than 30% likely representing completed infarct = 0 mL.      2.   T Max more than 6 seconds likely representing combination of completed infarct and ischemia = 0 mL.      3.  Mismatched volume likely representing ischemic brain/penumbra = None      4.  Please note that the cerebral perfusion was performed on the limited brain tissue around the basal ganglia region. Infarct/ischemia outside the CT perfusion sections can be missed in this study.      CT-HEAD W/O   Final Result      1.  No evidence of acute intracranial process.      2.  Cerebral atrophy as well as periventricular chronic small vessel ischemic change.            COURSE & MEDICAL DECISION MAKING  Pertinent Labs & Imaging studies reviewed. (See chart for details)  Patient is not a candidate for IV alteplase, current NIH stroke scale score of 0.  She initially had weakness in her left arm although difficult exam.  She was seen by neurology regarding this.      Patient with leukocytosis, evidence of facial infection.  CT scan shows evidence of phlegmon, fluid along the mandible and possible small area of air or gas concerning for abscess formation.  Dr. Gibson on-call for oral surgery has been consulted.  Patient had episode with paramedics of unknown etiology.  She was talkative, custodial to the hospital appeared to have a stroke with left-sided weakness and a aphasia.  This spontaneously resolved in the emergency department.  She was seen by neurology, tentative plan was for EEG.  Please refer to their consultation for further.  Patient did receive stroke protocol a CT scan of the head, negative CT scan of the head, with findings regarding her facial infection above.  Patient received IV Unasyn.  She does not appear septic at this time.    FINAL IMPRESSION     1. Facial cellulitis     2. Altered mental status, unspecified altered mental status type     3. Left arm weakness     4. Loss of consciousness (HCC)  REFERRAL TO PHYSIATRY (PMR)       Critical care time 32 minutes           Electronically signed by: Cameron LEW  JOSÉ Kirk, 8/8/2020 10:05 AM

## 2020-08-08 NOTE — ASSESSMENT & PLAN NOTE
With facial cellulitis  Likely dental source given her severe dental decay  Continue antibiotics.   oral surgery Dr. Dc was consulted for surgical intervention.  She underwent Incision and drainage of the right mandibular buccal space abscess with Penrose drain placement. Extraction of teeth numbers 3,4, 5, 12, 18, 19, and 30 on August 8, 2020  Continue provided pain control.

## 2020-08-08 NOTE — PROGRESS NOTES
Transported patient and belongings from X-ray. Pt in bed with call light in reach. SCDs and telemetry box in place. 2 RN skin check complete. Will continue hourly rounding.

## 2020-08-08 NOTE — ED TRIAGE NOTES
Chief Complaint   Patient presents with   • Facial Swelling     right sided x1 week   • Syncope     en route to hospital she had episode where she lost consciousness for approx 2min. Following she is slow to respond, only verbal response was her first name with slurring. She will follow simple commands with strength deficit on left. No convulsions or abnormal movement seen by medics   • Possible Stroke     activated as stroke protocol by EMS. Onset 0905hrs     To CT scan with RN then to red 7. Report to Mulugeta WYNNE.

## 2020-08-08 NOTE — PROGRESS NOTES
2 RN skin check with SHERRELL Bolivar. Pts skin fragile. Swelling and redness to right side of jaw. No other skin issues noted.

## 2020-08-08 NOTE — H&P
Hospital Medicine History & Physical Note    Date of Service  8/8/2020    Primary Care Physician  Duane Patel D.O.    Consultants  Dr. Dc, oral surgery, was called from the ER.    Code Status  Full Code    Chief Complaint  Chief Complaint   Patient presents with   • Facial Swelling     right sided x1 week   • Syncope     en route to hospital she had episode where she lost consciousness for approx 2min. Following she is slow to respond, only verbal response was her first name with slurring. She will follow simple commands with strength deficit on left. No convulsions or abnormal movement seen by medics   • Possible Stroke     activated as stroke protocol by EMS. Onset 0905hrs       History of Presenting Illness  85 y.o. female who presented 8/8/2020 with right mandibular pain and swelling.  Ms. Bishop has a past medical history of hypertension and previous stroke that was brought to the room by paramedics this morning because of the right jaw pain and swelling.  While she was being brought in, paramedics witnessed a transient loss of consciousness.  A code stroke was called and she underwent extensive imaging.  As her mentation improved did have transient weakness that also subsided.  A CTA of the head neck was negative and neurology was consulted.  Currently patient does not have any recollection of that event and is a non-historian.  I tried to call her daughter Zuri Naik and left a message with her.  Emergency room she is found to have clinically a mandibular abscess is also seen on CT of the head.  Dr. Dc surgery is been consulted from the emergency room and will evaluate her.  She will be kept n.p.o. in case she goes to surgery.  A stat screening COVID has been ordered for preoperative clearance.   Review of Systems  Review of Systems   Unable to perform ROS: Mental acuity       Past Medical History   has a past medical history of Hypertension, Stroke (HCC), and TIA (transient ischemic attack)  (1/2015).    Surgical History   has a past surgical history that includes ankle orif (Right, 6/1/2017); hip nailing intramedullary (Right, 10/10/2018); gastroscopy-endo (N/A, 5/12/2019); and colonoscopy - endo (N/A, 5/12/2019).     Family History  family history includes Cancer in her mother and sister.     Social History   reports that she has never smoked. She has never used smokeless tobacco. She reports previous alcohol use. She reports that she does not use drugs.    Allergies  No Known Allergies    Medications  Prior to Admission Medications   Prescriptions Last Dose Informant Patient Reported? Taking?   amlodipine (NORVASC) 10 MG Tab  Family Member Yes No   Sig: Take 10 mg by mouth every day. Indications: High Blood Pressure   citalopram (CELEXA) 40 MG Tab  Family Member Yes No   Sig: Take 40 mg by mouth every day.      Facility-Administered Medications: None       Physical Exam  Temp:  [38 °C (100.4 °F)] 38 °C (100.4 °F)  Pulse:  [79-88] 79  Resp:  [15-21] 15  BP: (133-138)/(62) 138/62  SpO2:  [94 %-95 %] 94 %    Physical Exam  Vitals signs and nursing note reviewed.   Constitutional:       Comments: Thin, frail-appearing   HENT:      Head: Normocephalic.      Mouth/Throat:      Mouth: Mucous membranes are dry.      Comments: Severe dental decay throughout molars  Right mandibular swelling, induration, hard to touch and tender with surrounding redness  Eyes:      General: No scleral icterus.     Conjunctiva/sclera: Conjunctivae normal.   Neck:      Musculoskeletal: Normal range of motion and neck supple.   Cardiovascular:      Rate and Rhythm: Normal rate and regular rhythm.      Heart sounds: No murmur.   Pulmonary:      Effort: Pulmonary effort is normal.      Breath sounds: Normal breath sounds.   Abdominal:      General: There is no distension.      Tenderness: There is no abdominal tenderness.   Musculoskeletal:      Right lower leg: No edema.      Left lower leg: No edema.   Skin:     General: Skin is  warm and dry.   Neurological:      Mental Status: She is alert.      Comments: She moves her arms and legs equally  Her speech is clear though content is not    Psychiatric:      Comments: She is pleasantly confused  She follows commands         Laboratory:  Recent Labs     08/08/20 0915   WBC 17.6*   RBC 4.24   HEMOGLOBIN 11.4*   HEMATOCRIT 36.1*   MCV 85.1   MCH 26.9*   MCHC 31.6*   RDW 49.3   PLATELETCT 360   MPV 9.7     Recent Labs     08/08/20 0915   SODIUM 138   POTASSIUM 4.0   CHLORIDE 101   CO2 20   GLUCOSE 204*   BUN 26*   CREATININE 1.04   CALCIUM 9.2     Recent Labs     08/08/20 0915   ALTSGPT 7   ASTSGOT 9*   ALKPHOSPHAT 93   TBILIRUBIN 0.7   GLUCOSE 204*     Recent Labs     08/08/20 0915   APTT 35.9   INR 1.06     No results for input(s): NTPROBNP in the last 72 hours.      Recent Labs     08/08/20  0915   TROPONINT 14       Imaging:  DX-CHEST-PORTABLE (1 VIEW)   Final Result      No evidence of acute cardiopulmonary process.      CT-CTA NECK WITH & W/O-POST PROCESSING   Final Result   Addendum 1 of 1   Additionally seen is inflammatory change anterior and inferior to the    right mandible with some gas in that area as well as a thin layer of fluid    measuring up to 4 mm in depth likely representing early premandibular    abscess. There is some phlegmonous    change seen as well as inflammatory change in that area. There is also    periodontal disease present. There some enlarged right anterior chain    cervical lymph nodes.      Final      CT-CTA HEAD WITH & W/O-POST PROCESS   Final Result      No evidence of intracranial vascular occlusion or aneurysm.      CT-CEREBRAL PERFUSION ANALYSIS   Final Result      1.  Cerebral blood flow less than 30% likely representing completed infarct = 0 mL.      2.  T Max more than 6 seconds likely representing combination of completed infarct and ischemia = 0 mL.      3.  Mismatched volume likely representing ischemic brain/penumbra = None      4.  Please note  that the cerebral perfusion was performed on the limited brain tissue around the basal ganglia region. Infarct/ischemia outside the CT perfusion sections can be missed in this study.      CT-HEAD W/O   Final Result      1.  No evidence of acute intracranial process.      2.  Cerebral atrophy as well as periventricular chronic small vessel ischemic change.      [id number]    (Results Pending)         Assessment/Plan:  I anticipate this patient will require at least two midnights for appropriate medical management, necessitating inpatient admission.    * Mandibular abscess- (present on admission)  Assessment & Plan  With facial cellulitis  Likely dental source given her severe dental decay  IV Unasyn  Oral surgery, Dr. Dc, consulted from the ER  NPO until cleared by surgery  IV fluids and pain control  panorex ordered from the ER    Sepsis (HCC)- (present on admission)  Assessment & Plan  This is Sepsis Present on admission  SIRS criteria identified on my evaluation include: Leukocytosis, with WBC greater than 12,000  Source is mandibular abscess  Sepsis protocol initiated  Fluid resuscitation ordered per protocol  IV antibiotics as appropriate for source of sepsis: IV Unasyn  While organ dysfunction may be noted elsewhere in this problem list or in the chart, degree of organ dysfunction does not meet CMS criteria for severe sepsis          Loss of consciousness (HCC)- (present on admission)  Assessment & Plan  Transient episode witnessed by paramedics  She had transient weakness that has resolved  CTA head negative  Neurology consulted  She remains encephalopathic though improving  Possible seizure with post-ictal state--EEG ordered      Essential hypertension- (present on admission)  Assessment & Plan  Continue norvasc with holding parameters

## 2020-08-08 NOTE — CONSULTS
Chief Complaint   Patient presents with   • Facial Swelling     right sided x1 week   • Syncope     en route to hospital she had episode where she lost consciousness for approx 2min. Following she is slow to respond, only verbal response was her first name with slurring. She will follow simple commands with strength deficit on left. No convulsions or abnormal movement seen by medics   • Possible Stroke     activated as stroke protocol by EMS. Onset 0905hrs       Problem List Items Addressed This Visit     None      Visit Diagnoses     Facial cellulitis        Altered mental status, unspecified altered mental status type        Left arm weakness          Neurology Stroke Alert Consultation     History of present illness:  This is an 85-year old female with PMhx significant for hypertension, TIA/Strokes (remotely) who presented to [facility] on 8/8/20 for a chief complaint of Right facial swelling as well as an episode of unresponsiveness followed by LUE weakness. Per EMS, the patient called EMS this morning for a 1-week history of Right facial swelling. She apparently walked out to the ambulance. En route, patient apparently became unresponsive; no convulsions/abnormal motor activity witnessed, however following the event patient was noted to have LUE weakness. Thus stroke alert called. At time of presentation, ; SBP 130s. CT head unremarkable; CTA head/neck with no obvious LVO. Patient ultimately determined not to be a candidate for IV tPA secondary to low suspicion for stroke, higher suspicion for seizure or syncopal-like event.  Currently, patient remains minimally responsive, no verbal output; follows commands intermittently, more briskly on the Right. Further ROS is limited.   **Shortly after arrival to room, patient became more arousable; now awake, alert; following commands bilaterally/equally, answering simple questions appropriately. She denies history of stroke or seizure. She denies headache,  dizziness, focal weakness, numbness or paresthesia.     Neurology has been consulted by Dr. Cameron Kirk to further evaluate the findings noted above.     Past medical history:   Past Medical History:   Diagnosis Date   • Hypertension    • Stroke (HCC)    • TIA (transient ischemic attack) 1/2015       Past surgical history:   Past Surgical History:   Procedure Laterality Date   • GASTROSCOPY-ENDO N/A 5/12/2019    Procedure: GASTROSCOPY;  Surgeon: Anastacia Smith M.D.;  Location: ENDOSCOPY [facility];  Service: Gastroenterology   • COLONOSCOPY - ENDO N/A 5/12/2019    Procedure: COLONOSCOPY;  Surgeon: Anastacia Smith M.D.;  Location: ENDOSCOPY [facility];  Service: Gastroenterology   • HIP NAILING INTRAMEDULLARY Right 10/10/2018    Procedure: HIP NAILING INTRAMEDULLARY;  Surgeon: Ricardo Luna M.D.;  Location: SURGERY [facility];  Service: Orthopedics   • ANKLE ORIF Right 6/1/2017    Procedure: ANKLE ORIF;  Surgeon: Ricardo Luna M.D.;  Location: SURGERY [facility];  Service:        Family history:   Family History   Problem Relation Age of Onset   • Cancer Mother         breast cancer   • Cancer Sister         breast cancer       Social history:   Social History     Socioeconomic History   • Marital status:      Spouse name: Not on file   • Number of children: Not on file   • Years of education: Not on file   • Highest education level: Not on file   Occupational History   • Not on file   Social Needs   • Financial resource strain: Not on file   • Food insecurity     Worry: Not on file     Inability: Not on file   • Transportation needs     Medical: Not on file     Non-medical: Not on file   Tobacco Use   • Smoking status: Never Smoker   • Smokeless tobacco: Never Used   Substance and Sexual Activity   • Alcohol use: Not Currently     Comment: SOCIAL   • Drug use: No   • Sexual activity: Not on file   Lifestyle   • Physical activity     Days per week: Not on file      Minutes per session: Not on file   • Stress: Not on file   Relationships   • Social connections     Talks on phone: Not on file     Gets together: Not on file     Attends [employer] service: Not on file     Active member of club or organization: Not on file     Attends meetings of clubs or organizations: Not on file     Relationship status: Not on file   • Intimate partner violence     Fear of current or ex partner: Not on file     Emotionally abused: Not on file     Physically abused: Not on file     Forced sexual activity: Not on file   Other Topics Concern   • Not on file   Social History Narrative   • Not on file       Current medications:   Current Facility-Administered Medications   Medication Dose   • ampicillin/sulbactam (UNASYN) 3 g in  mL IVPB  3 g   • amLODIPine (NORVASC) tablet 10 mg  10 mg   • citalopram (CELEXA) tablet 40 mg  40 mg   • senna-docusate (PERICOLACE or SENOKOT S) 8.6-50 MG per tablet 2 Tab  2 Tab    And   • polyethylene glycol/lytes (MIRALAX) PACKET 1 Packet  1 Packet    And   • magnesium hydroxide (MILK OF MAGNESIA) suspension 30 mL  30 mL    And   • bisacodyl (DULCOLAX) suppository 10 mg  10 mg   • lactated ringers infusion     • acetaminophen (TYLENOL) tablet 650 mg  650 mg   • ondansetron (ZOFRAN) syringe/vial injection 4 mg  4 mg   • ondansetron (ZOFRAN ODT) dispertab 4 mg  4 mg   • ampicillin/sulbactam (UNASYN) 3 g in  mL IVPB  3 g     Current Outpatient Medications   Medication   • citalopram (CELEXA) 40 MG Tab   • amlodipine (NORVASC) 10 MG Tab       Medication Allergy:  No Known Allergies    Review of systems:   Constitutional: denies fever, night sweats, weight loss.   Eyes: denies acute vision change, eye pain or secretion.   Ears, Nose, Mouth, Throat: denies nasal secretion, nasal bleeding, difficulty swallowing, hearing loss, tinnitus, vertigo, ear pain, acute dental problems, oral ulcers or lesions.   Endocrine: denies recent weight changes, heat or cold  intolerance, polyuria, polydypsia, polyphagia,abnormal hair growth.  Cardiovascular: denies new onset of chest pain, palpitations, syncope, or dyspnea of exertion.  Pulmonary: denies shortness of breath, new onset of cough, hemoptysis, wheezing, chest pain or flu-like symptoms.   GI: denies nausea, vomiting, diarrhea, GI bleeding, change in appetite, abdominal pain, and change in bowel habits.  : denies dysuria, urinary incontinence, hematuria.  Heme/oncology: denies history of easy bruising or bleeding. No history of cancer, DVTor PE.  Allergy/immunology: denies hives/urticaria, or itching.   Dermatologic: denies new rash, or new skin lesions.  Musculoskeletal:denies joint swelling or pain, muscle pain, neck and back pain.   Neurologic: As noted above.    Psychiatric: denies symptoms of depression, anxiety, hallucinations, mood swings or changes, suicidal or homicidal thoughts.       Physical examination:   Vitals:    08/08/20 0937 08/08/20 0941 08/08/20 1001 08/08/20 1101   BP: 133/62  138/62 105/53   Pulse: 82  79 79   Resp: 18  15 18   Temp:  38 °C (100.4 °F)     TempSrc:  Temporal     SpO2: 94%  94% 93%   Weight:       Height:         General: Patient in no acute distress  HEENT: Normocephalic, no signs of acute trauma.   Neck: supple, no meningeal signs or carotid bruits. There is normal range of motion. No tenderness on exam.   Chest: clear to auscultation. No cough.   CV: RRR, no murmurs.   Skin: no signs of acute rashes or trauma.   Musculoskeletal: joints exhibit full range of motion, without any pain to palpation. There are no signs of joint or muscle swelling. There is no tenderness to deep palpation of muscles.   Psychiatric: No hallucinatory behavior.        NEUROLOGICAL EXAM:   Mental status, orientation: Drowsy however awake, oriented to self, place.  Speech and language: speech is somewhat fluent, non dysarthric. The patient is able to name, repeat and comprehend.   Cranial nerve exam: Pupils are  3-4 mm bilaterally and equally reactive to light and accommodation. Visual fields are intact by confrontation.  There is no nystagmus on primary or secondary gaze. Intact full EOM in all directions of gaze. Face appears symmetric. Sensation in the face is intact to light touch. Uvula is midline. Palate elevates symmetrically. Tongue is midline and without any signs of tongue biting or fasciculations. Sternocleidomastoid muscles exhibit is normal strength bilaterally. Shoulder shrug is intact bilaterally.   Motor exam: Strength is 4+/5 in all extremities. Tone is normal. No abnormal movements were seen on exam.   Sensory exam reveals normal sense of light touch and pinprick in all extremities.   Deep tendon reflexes:  2+ throughout. Plantar responses are flexor. There is no clonus.   Coordination: shows a normal finger-nose-finger. Normal rapidly alternating movements.   Gait: Not assessed at this time as patient is a fall risk.       NIH Stroke Scale    1a Level of Consciousness   1b Orientation Questions   1c Response to Commands   2 Gaze   3 Visual Fields   4 Facial Movement   5 Motor Function (arm)   a Left   b Right   6 Motor Function (leg)   a Left   b Right   7 Limb Ataxia   8 Sensory   9 Language   10 Articulation   11 Extinction/Inattention     Score: 0      ANCILLARY DATA REVIEWED:     Lab Data Review:  Recent Results (from the past 24 hour(s))   CBC WITH DIFFERENTIAL    Collection Time: 08/08/20  9:15 AM   Result Value Ref Range    WBC 17.6 (H) 4.8 - 10.8 K/uL    RBC 4.24 4.20 - 5.40 M/uL    Hemoglobin 11.4 (L) 12.0 - 16.0 g/dL    Hematocrit 36.1 (L) 37.0 - 47.0 %    MCV 85.1 81.4 - 97.8 fL    MCH 26.9 (L) 27.0 - 33.0 pg    MCHC 31.6 (L) 33.6 - 35.0 g/dL    RDW 49.3 35.9 - 50.0 fL    Platelet Count 360 164 - 446 K/uL    MPV 9.7 9.0 - 12.9 fL    Neutrophils-Polys 81.50 (H) 44.00 - 72.00 %    Lymphocytes 6.90 (L) 22.00 - 41.00 %    Monocytes 10.30 0.00 - 13.40 %    Eosinophils 0.20 0.00 - 6.90 %    Basophils  0.70 0.00 - 1.80 %    Immature Granulocytes 0.40 0.00 - 0.90 %    Nucleated RBC 0.00 /100 WBC    Neutrophils (Absolute) 14.32 (H) 2.00 - 7.15 K/uL    Lymphs (Absolute) 1.22 1.00 - 4.80 K/uL    Monos (Absolute) 1.81 (H) 0.00 - 0.85 K/uL    Eos (Absolute) 0.04 0.00 - 0.51 K/uL    Baso (Absolute) 0.13 (H) 0.00 - 0.12 K/uL    Immature Granulocytes (abs) 0.07 0.00 - 0.11 K/uL    NRBC (Absolute) 0.00 K/uL   COMP METABOLIC PANEL    Collection Time: 08/08/20  9:15 AM   Result Value Ref Range    Sodium 138 135 - 145 mmol/L    Potassium 4.0 3.6 - 5.5 mmol/L    Chloride 101 96 - 112 mmol/L    Co2 20 20 - 33 mmol/L    Anion Gap 17.0 (H) 7.0 - 16.0    Glucose 204 (H) 65 - 99 mg/dL    Bun 26 (H) 8 - 22 mg/dL    Creatinine 1.04 0.50 - 1.40 mg/dL    Calcium 9.2 8.5 - 10.5 mg/dL    AST(SGOT) 9 (L) 12 - 45 U/L    ALT(SGPT) 7 2 - 50 U/L    Alkaline Phosphatase 93 30 - 99 U/L    Total Bilirubin 0.7 0.1 - 1.5 mg/dL    Albumin 3.8 3.2 - 4.9 g/dL    Total Protein 7.2 6.0 - 8.2 g/dL    Globulin 3.4 1.9 - 3.5 g/dL    A-G Ratio 1.1 g/dL   PROTHROMBIN TIME    Collection Time: 08/08/20  9:15 AM   Result Value Ref Range    PT 14.2 12.0 - 14.6 sec    INR 1.06 0.87 - 1.13   APTT    Collection Time: 08/08/20  9:15 AM   Result Value Ref Range    APTT 35.9 24.7 - 36.0 sec   COD (ADULT)    Collection Time: 08/08/20  9:15 AM   Result Value Ref Range    ABO Grouping Only A     Rh Grouping Only POS     Antibody Screen-Cod NEG    TROPONIN    Collection Time: 08/08/20  9:15 AM   Result Value Ref Range    Troponin T 14 6 - 19 ng/L   ESTIMATED GFR    Collection Time: 08/08/20  9:15 AM   Result Value Ref Range    GFR If African American >60 >60 mL/min/1.73 m 2    GFR If Non African American 50 (A) >60 mL/min/1.73 m 2   ACCU-CHEK GLUCOSE    Collection Time: 08/08/20  9:16 AM   Result Value Ref Range    Glucose - Accu-Ck 180 (H) 65 - 99 mg/dL   EKG (NOW)    Collection Time: 08/08/20  9:34 AM   Result Value Ref Range    Report       [facility]  [facility]    Test Date:  2020  Pt Name:    RIGOBERTO TAYLOR                  Department: ER  MRN:        [id number]                      Room:       RD 07  Gender:     Female                       Technician: [id number]  :        [id number]                   Requested By:MARTHA SCHAFFER  Order #:    [id number]                    Reading MD:    Measurements  Intervals                                Axis  Rate:       85                           P:          65  VT:         208                          QRS:        26  QRSD:       72                           T:          15  QT:         392  QTc:        467    Interpretive Statements  SINUS RHYTHM  BORDERLINE T WAVE ABNORMALITIES  Compared to ECG 2019 10:01:19  T-wave abnormality now present  Atrial premature complex(es) no longer present     LACTIC ACID    Collection Time: 20 10:04 AM   Result Value Ref Range    Lactic Acid 1.4 0.5 - 2.0 mmol/L       Labs reviewed by me.       Imaging reviewed by me:     DX-CHEST-PORTABLE (1 VIEW)   Final Result      No evidence of acute cardiopulmonary process.      CT-CTA NECK WITH & W/O-POST PROCESSING   Final Result   Addendum 1 of 1   Additionally seen is inflammatory change anterior and inferior to the    right mandible with some gas in that area as well as a thin layer of fluid    measuring up to 4 mm in depth likely representing early premandibular    abscess. There is some phlegmonous    change seen as well as inflammatory change in that area. There is also    periodontal disease present. There some enlarged right anterior chain    cervical lymph nodes.      Final      CT-CTA HEAD WITH & W/O-POST PROCESS   Final Result      No evidence of intracranial vascular occlusion or aneurysm.      CT-CEREBRAL PERFUSION ANALYSIS   Final Result      1.  Cerebral blood flow less than 30% likely representing completed infarct = 0 mL.      2.  T Max more than 6 seconds likely representing combination of completed  infarct and ischemia = 0 mL.      3.  Mismatched volume likely representing ischemic brain/penumbra = None      4.  Please note that the cerebral perfusion was performed on the limited brain tissue around the basal ganglia region. Infarct/ischemia outside the CT perfusion sections can be missed in this study.      CT-HEAD W/O   Final Result      1.  No evidence of acute intracranial process.      2.  Cerebral atrophy as well as periventricular chronic small vessel ischemic change.      [id number]    (Results Pending)         Presumed mechanism by TOAST:  __Large Artery Atherosclerosis  __Small Vessel (Lacunar)  __Cardioembolic  __Other (Sickle Cell, Vasculitis, Hypercoagulable)  _X_Unknown    Modified Decker Scale (MRS): 2 = Slight disability; unable to perform all previous activities but able to look after own affairs without assistance      ASSESSMENT AND PLAN:  85-year old female with PMhx significant for hypertension, TIA/Strokes (remotely) who presented to [facility] on 8/8/20 for a chief complaint of Right facial swelling as well as an episode of unresponsiveness followed by LUE weakness; on arrival here, patient was found by me to be poorly responsive/minimally interactive, not following commands; by the time she returned to her room in the ED, patient awake, following commands, no focal neurological deficits. CT head with no acute intracranial abnormality; did note Right mandibular abscess corellating with Right facial swelling and leukocytosis/possible sepsis, WBC 17.6.  Patient ultimately determined not to be a candidate for IV tPA secondary to low suspicion for stroke, higher suspicion for seizure or syncopal-like event. Will recommend EEG to formally rule out seizure/predisposition for seizure; will defer medical management of sepsis to primary team.     Additional Recommendations/Plan:     -q4h and PRN neuro assessment. VS per nursing/unit protocol. BP goal < 140/90.   -Telemetry;  currently SR. Screen for arrhythmia. Obtain TTE. If further concern for syncope, obtain orthostatic VS.   -Obtain MRI Brain wo contrast (though relatively low suspicion for stroke, feel this reasonable given hx of TIA/Stroke)  -Obtain routine EEG.    -Check hemoglobin A1c, Lipid panel.   -PT/OT/SLP eval and treat.   -Will follow up with results of the above. All other medical management including management of infection/abscess per primary team.   -DVT PPX: SCDs.     The plan of care above has been discussed with Dr. Jaramillo.     ABIGAIL [email or web address].[email or web address].CHICO.  [facility]

## 2020-08-08 NOTE — ASSESSMENT & PLAN NOTE
This is Sepsis Present on admission  SIRS criteria identified on my evaluation include: Leukocytosis, with WBC greater than 12,000  Source is mandibular abscess  Sepsis protocol initiated  Fluid resuscitation ordered per protocol  IV antibiotics as appropriate for source of sepsis: IV Unasyn  While organ dysfunction may be noted elsewhere in this problem list or in the chart, degree of organ dysfunction does not meet CMS criteria for severe sepsis    Continue antibiotics.  I prescribed her Augmentin for outpatient.  Source control by oral surgery  She remains afebrile.  Cultures negative to date.

## 2020-08-08 NOTE — ED NOTES
Med rec waiting on medication information from [facility] living [facility]

## 2020-08-09 ENCOUNTER — APPOINTMENT (OUTPATIENT)
Dept: RADIOLOGY | Facility: MEDICAL CENTER | Age: 85
DRG: 854 | End: 2020-08-09
Attending: NURSE PRACTITIONER
Payer: MEDICARE

## 2020-08-09 LAB
CHOLEST SERPL-MCNC: 171 MG/DL (ref 100–199)
EST. AVERAGE GLUCOSE BLD GHB EST-MCNC: 123 MG/DL
GLUCOSE BLD-MCNC: 101 MG/DL (ref 65–99)
GLUCOSE BLD-MCNC: 141 MG/DL (ref 65–99)
HBA1C MFR BLD: 5.9 % (ref 0–5.6)
HDLC SERPL-MCNC: 83 MG/DL
LDLC SERPL CALC-MCNC: 75 MG/DL
TRIGL SERPL-MCNC: 63 MG/DL (ref 0–149)

## 2020-08-09 PROCEDURE — A9270 NON-COVERED ITEM OR SERVICE: HCPCS | Performed by: HOSPITALIST

## 2020-08-09 PROCEDURE — 97161 PT EVAL LOW COMPLEX 20 MIN: CPT

## 2020-08-09 PROCEDURE — 97165 OT EVAL LOW COMPLEX 30 MIN: CPT

## 2020-08-09 PROCEDURE — 700102 HCHG RX REV CODE 250 W/ 637 OVERRIDE(OP): Performed by: HOSPITALIST

## 2020-08-09 PROCEDURE — 770020 HCHG ROOM/CARE - TELE (206)

## 2020-08-09 PROCEDURE — 36415 COLL VENOUS BLD VENIPUNCTURE: CPT

## 2020-08-09 PROCEDURE — 80061 LIPID PANEL: CPT

## 2020-08-09 PROCEDURE — 99232 SBSQ HOSP IP/OBS MODERATE 35: CPT | Performed by: NURSE PRACTITIONER

## 2020-08-09 PROCEDURE — 700102 HCHG RX REV CODE 250 W/ 637 OVERRIDE(OP): Performed by: INTERNAL MEDICINE

## 2020-08-09 PROCEDURE — 0C94XZZ DRAINAGE OF BUCCAL MUCOSA, EXTERNAL APPROACH: ICD-10-PCS | Performed by: ORAL & MAXILLOFACIAL SURGERY

## 2020-08-09 PROCEDURE — 99233 SBSQ HOSP IP/OBS HIGH 50: CPT | Performed by: INTERNAL MEDICINE

## 2020-08-09 PROCEDURE — 0CDXXZ1 EXTRACTION OF LOWER TOOTH, MULTIPLE, EXTERNAL APPROACH: ICD-10-PCS | Performed by: ORAL & MAXILLOFACIAL SURGERY

## 2020-08-09 PROCEDURE — 700105 HCHG RX REV CODE 258: Performed by: HOSPITALIST

## 2020-08-09 PROCEDURE — 83036 HEMOGLOBIN GLYCOSYLATED A1C: CPT

## 2020-08-09 PROCEDURE — 0CDWXZ1 EXTRACTION OF UPPER TOOTH, MULTIPLE, EXTERNAL APPROACH: ICD-10-PCS | Performed by: ORAL & MAXILLOFACIAL SURGERY

## 2020-08-09 PROCEDURE — 770006 HCHG ROOM/CARE - MED/SURG/GYN SEMI*

## 2020-08-09 PROCEDURE — 92610 EVALUATE SWALLOWING FUNCTION: CPT

## 2020-08-09 PROCEDURE — 700111 HCHG RX REV CODE 636 W/ 250 OVERRIDE (IP): Performed by: HOSPITALIST

## 2020-08-09 PROCEDURE — 82962 GLUCOSE BLOOD TEST: CPT

## 2020-08-09 PROCEDURE — 70551 MRI BRAIN STEM W/O DYE: CPT | Mod: ME

## 2020-08-09 RX ORDER — DEXTROSE MONOHYDRATE 25 G/50ML
50 INJECTION, SOLUTION INTRAVENOUS
Status: DISCONTINUED | OUTPATIENT
Start: 2020-08-09 | End: 2020-08-12 | Stop reason: HOSPADM

## 2020-08-09 RX ADMIN — AMPICILLIN SODIUM AND SULBACTAM SODIUM 1.5 G: 1; .5 INJECTION, POWDER, FOR SOLUTION INTRAMUSCULAR; INTRAVENOUS at 05:12

## 2020-08-09 RX ADMIN — AMPICILLIN SODIUM AND SULBACTAM SODIUM 1.5 G: 1; .5 INJECTION, POWDER, FOR SOLUTION INTRAMUSCULAR; INTRAVENOUS at 00:05

## 2020-08-09 RX ADMIN — DOCUSATE SODIUM 50 MG AND SENNOSIDES 8.6 MG 2 TABLET: 8.6; 5 TABLET, FILM COATED ORAL at 18:34

## 2020-08-09 RX ADMIN — AMPICILLIN SODIUM AND SULBACTAM SODIUM 1.5 G: 1; .5 INJECTION, POWDER, FOR SOLUTION INTRAMUSCULAR; INTRAVENOUS at 13:19

## 2020-08-09 RX ADMIN — ACETAMINOPHEN 650 MG: 325 TABLET, FILM COATED ORAL at 08:33

## 2020-08-09 RX ADMIN — AMPICILLIN SODIUM AND SULBACTAM SODIUM 1.5 G: 1; .5 INJECTION, POWDER, FOR SOLUTION INTRAMUSCULAR; INTRAVENOUS at 20:21

## 2020-08-09 ASSESSMENT — ENCOUNTER SYMPTOMS
SHORTNESS OF BREATH: 0
NAUSEA: 0
EYE PAIN: 0
SENSORY CHANGE: 0
FEVER: 0
MYALGIAS: 0
COUGH: 0
PALPITATIONS: 0
HEADACHES: 0
HALLUCINATIONS: 0
TINGLING: 0
TREMORS: 0
DOUBLE VISION: 0
SPEECH CHANGE: 0
FOCAL WEAKNESS: 0
CHILLS: 0
PHOTOPHOBIA: 0
ORTHOPNEA: 0
WEIGHT LOSS: 0
BACK PAIN: 0
NECK PAIN: 0
BLURRED VISION: 0
CONSTIPATION: 0
DIARRHEA: 0
ABDOMINAL PAIN: 0
VOMITING: 0
DIZZINESS: 0
SPUTUM PRODUCTION: 0

## 2020-08-09 ASSESSMENT — COGNITIVE AND FUNCTIONAL STATUS - GENERAL
SUGGESTED CMS G CODE MODIFIER MOBILITY: CJ
CLIMB 3 TO 5 STEPS WITH RAILING: A LITTLE
TURNING FROM BACK TO SIDE WHILE IN FLAT BAD: A LITTLE
MOBILITY SCORE: 20
MOVING FROM LYING ON BACK TO SITTING ON SIDE OF FLAT BED: A LITTLE
SUGGESTED CMS G CODE MODIFIER DAILY ACTIVITY: CI
HELP NEEDED FOR BATHING: A LITTLE
MOVING TO AND FROM BED TO CHAIR: A LITTLE
DAILY ACTIVITIY SCORE: 23

## 2020-08-09 ASSESSMENT — GAIT ASSESSMENTS
GAIT LEVEL OF ASSIST: SUPERVISED
ASSISTIVE DEVICE: SINGLE POINT CANE
DISTANCE (FEET): 80

## 2020-08-09 ASSESSMENT — LIFESTYLE VARIABLES: SUBSTANCE_ABUSE: 0

## 2020-08-09 ASSESSMENT — ACTIVITIES OF DAILY LIVING (ADL): TOILETING: INDEPENDENT

## 2020-08-09 NOTE — PROGRESS NOTES
Oral Surgery Preop    HD #1 s/p admssion.  Service:  Hospitalist Service    ID:  85 year-old female with 14 day history of right lower facial pain and swelling and decayed teeth.  Dx:  Right mandibular buccal space abscess and decayed infected tooth number 30.  Advanced decay teeth numbers 3, 4, 5, 18 and 30.  Plan:  Extraction of listed teeth and incision and drainage of the right mandibular buccal space abscess.  Anesthesia:  GA    Consent:  Discussed injury, necessary surgery, P/R/B/A.  Risks include infection, bleeding, nerve injury, scarring, need for further surgery or other treatment.  Questions answered.  Verbal consent given.  Written consent form signed.    Scheduled:  Today at 5:30 pm  Location:  [facility] OR    Loc Dc MD, DDS

## 2020-08-09 NOTE — DISCHARGE PLANNING
[facility] Transitional Care Coordination     Referral from:  Dr. Navas   Facesheet indicates: Medicare   Potential Rehab Diagnosis: Debility secondary to Oral abscess s/p ID     Stroke ruled out.      Physiatry consultation denied per protocol.        Limited diagnosis for IRF level of care.       Thank you for the referral.

## 2020-08-09 NOTE — OR NURSING
Pt VSS. Pt pleasantly confused but denies pain and nausea. Penrose drain noted to right lower jaw. Pt instructed not to touch drain and following commands. Pt removed gauze in place from OR. No drainage noted. Pt BP at 1915 82/42. Pt given 250ml LR bolus and BP now back to baseline. Called and updated pts daughter on pt status. Report called to SHERRELL Moffett.    O2 tank 3/4 full for transport back to room. Pt on 2L via NC.

## 2020-08-09 NOTE — PROGRESS NOTES
Chief Complaint   Patient presents with   • Facial Swelling     right sided x1 week   • Syncope     en route to hospital she had episode where she lost consciousness for approx 2min. Following she is slow to respond, only verbal response was her first name with slurring. She will follow simple commands with strength deficit on left. No convulsions or abnormal movement seen by medics   • Possible Stroke     activated as stroke protocol by EMS. Onset 0905hrs       Problem List Items Addressed This Visit     Loss of consciousness (HCC)     Transient episode witnessed by paramedics  She had transient weakness that has resolved  CTA head negative  Neurology consulted  She remains encephalopathic though improving  Possible seizure with post-ictal state--EEG ordered           Relevant Orders    REFERRAL TO PHYSIATRY (PMR)      Other Visit Diagnoses     Facial cellulitis        Altered mental status, unspecified altered mental status type        Left arm weakness          Neurology Progress Note     History of present illness:  This is an 85-year old female with PMhx significant for hypertension, TIA/Strokes (remotely) who presented to [facility] on 8/8/20 for a chief complaint of Right facial swelling as well as an episode of unresponsiveness followed by LUE weakness. Per EMS, the patient called EMS this morning for a 1-week history of Right facial swelling. She apparently walked out to the ambulance. En route, patient apparently became unresponsive; no convulsions/abnormal motor activity witnessed, however following the event patient was noted to have LUE weakness. Thus stroke alert called. At time of presentation, ; SBP 130s. CT head unremarkable; CTA head/neck with no obvious LVO. Patient ultimately determined not to be a candidate for IV tPA secondary to low suspicion for stroke, higher suspicion for seizure or syncopal-like event.  Currently, patient remains minimally responsive, no verbal output;  follows commands intermittently, more briskly on the Right. Further ROS is limited.   **Shortly after arrival to room, patient became more arousable; now awake, alert; following commands bilaterally/equally, answering simple questions appropriately. She denies history of stroke or seizure. She denies headache, dizziness, focal weakness, numbness or paresthesia.     Neurology has been consulted by Dr. Cameron Kirk to further evaluate the findings noted above.     Interval, 8/9/20:   Patient sitting up in bed; awake and alert; oriented and appropriate. Denies headache or dizziness; denies focal weakness, numbness or paresthesia. No problem with vision, speech or swallowing. Patient now s/p Right mandibular abscess I&D last night; being treated with ceftriaxone. No other events or seizure-like events overnight.     No changes to HPI as was previously documented.     Past medical history:   Past Medical History:   Diagnosis Date   • Hypertension    • Stroke (HCC)    • TIA (transient ischemic attack) 1/2015       Past surgical history:   Past Surgical History:   Procedure Laterality Date   • GASTROSCOPY-ENDO N/A 5/12/2019    Procedure: GASTROSCOPY;  Surgeon: Anastacia Smith M.D.;  Location: ENDOSCOPY [facility];  Service: Gastroenterology   • COLONOSCOPY - ENDO N/A 5/12/2019    Procedure: COLONOSCOPY;  Surgeon: Anastacia Smith M.D.;  Location: ENDOSCOPY [facility];  Service: Gastroenterology   • HIP NAILING INTRAMEDULLARY Right 10/10/2018    Procedure: HIP NAILING INTRAMEDULLARY;  Surgeon: Ricardo Luna M.D.;  Location: SURGERY [facility];  Service: Orthopedics   • ANKLE ORIF Right 6/1/2017    Procedure: ANKLE ORIF;  Surgeon: Ricardo Luna M.D.;  Location: SURGERY [facility];  Service:        Family history:   Family History   Problem Relation Age of Onset   • Cancer Mother         breast cancer   • Cancer Sister         breast cancer       Social history:   Social History      Socioeconomic History   • Marital status:      Spouse name: Not on file   • Number of children: Not on file   • Years of education: Not on file   • Highest education level: Not on file   Occupational History   • Not on file   Social Needs   • Financial resource strain: Not on file   • Food insecurity     Worry: Not on file     Inability: Not on file   • Transportation needs     Medical: Not on file     Non-medical: Not on file   Tobacco Use   • Smoking status: Never Smoker   • Smokeless tobacco: Never Used   Substance and Sexual Activity   • Alcohol use: Not Currently     Comment: SOCIAL   • Drug use: No   • Sexual activity: Not on file   Lifestyle   • Physical activity     Days per week: Not on file     Minutes per session: Not on file   • Stress: Not on file   Relationships   • Social connections     Talks on phone: Not on file     Gets together: Not on file     Attends Shinto service: Not on file     Active member of club or organization: Not on file     Attends meetings of clubs or organizations: Not on file     Relationship status: Not on file   • Intimate partner violence     Fear of current or ex partner: Not on file     Emotionally abused: Not on file     Physically abused: Not on file     Forced sexual activity: Not on file   Other Topics Concern   • Not on file   Social History Narrative   • Not on file       Current medications:   Current Facility-Administered Medications   Medication Dose   • amLODIPine (NORVASC) tablet 10 mg  10 mg   • citalopram (CELEXA) tablet 40 mg  40 mg   • senna-docusate (PERICOLACE or SENOKOT S) 8.6-50 MG per tablet 2 Tab  2 Tab    And   • polyethylene glycol/lytes (MIRALAX) PACKET 1 Packet  1 Packet    And   • magnesium hydroxide (MILK OF MAGNESIA) suspension 30 mL  30 mL    And   • bisacodyl (DULCOLAX) suppository 10 mg  10 mg   • lactated ringers infusion     • acetaminophen (TYLENOL) tablet 650 mg  650 mg   • ondansetron (ZOFRAN) syringe/vial injection 4 mg  4  mg   • ondansetron (ZOFRAN ODT) dispertab 4 mg  4 mg   • ampicillin/sulbactam (UNASYN) 1.5 g in  mL IVPB  1.5 g       Medication Allergy:  No Known Allergies    Review of systems:   Constitutional: denies fever, night sweats, weight loss.   Eyes: denies acute vision change, eye pain or secretion.   Ears, Nose, Mouth, Throat: denies nasal secretion, nasal bleeding, difficulty swallowing, hearing loss, tinnitus, vertigo, ear pain, acute dental problems, oral ulcers or lesions.   Endocrine: denies recent weight changes, heat or cold intolerance, polyuria, polydypsia, polyphagia,abnormal hair growth.  Cardiovascular: denies new onset of chest pain, palpitations, syncope, or dyspnea of exertion.  Pulmonary: denies shortness of breath, new onset of cough, hemoptysis, wheezing, chest pain or flu-like symptoms.   GI: denies nausea, vomiting, diarrhea, GI bleeding, change in appetite, abdominal pain, and change in bowel habits.  : denies dysuria, urinary incontinence, hematuria.  Heme/oncology: denies history of easy bruising or bleeding. No history of cancer, DVTor PE.  Allergy/immunology: denies hives/urticaria, or itching.   Dermatologic: denies new rash, or new skin lesions.  Musculoskeletal:denies joint swelling or pain, muscle pain, neck and back pain.   Neurologic: As noted above.    Psychiatric: denies symptoms of depression, anxiety, hallucinations, mood swings or changes, suicidal or homicidal thoughts.       Physical examination:   Vitals:    08/08/20 2032 08/08/20 2342 08/09/20 0400 08/09/20 0857   BP: 114/63 (!) 95/44 101/81 103/58   Pulse: 81 72 77 71   Resp: 16 16 17 16   Temp: 36.7 °C (98.1 °F) 36.2 °C (97.1 °F) 36.3 °C (97.4 °F) 36.4 °C (97.5 °F)   TempSrc: Temporal Temporal Temporal Temporal   SpO2: 92% 99% 94% 95%   Weight:       Height:         General: Patient in no acute distress  HEENT: Normocephalic, no signs of acute trauma.   Neck: supple, no meningeal signs or carotid bruits. There is  normal range of motion. No tenderness on exam.   Chest: clear to auscultation. No cough.   CV: RRR, no murmurs.   Skin: no signs of acute rashes or trauma.   Musculoskeletal: joints exhibit full range of motion, without any pain to palpation. There are no signs of joint or muscle swelling. There is no tenderness to deep palpation of muscles.   Psychiatric: No hallucinatory behavior.        NEUROLOGICAL EXAM:   Mental status, orientation: Awake alert and oriented x 4.   Speech and language: speech is fluent, non dysarthric. The patient is able to name, repeat and comprehend.   Cranial nerve exam: Pupils are 3-4 mm bilaterally and equally reactive to light and accommodation. Visual fields are intact by confrontation.  There is no nystagmus on primary or secondary gaze. Intact full EOM in all directions of gaze. Face appears symmetric. Sensation in the face is intact to light touch. Uvula is midline. Palate elevates symmetrically. Tongue is midline and without any signs of tongue biting or fasciculations. Sternocleidomastoid muscles exhibit is normal strength bilaterally. Shoulder shrug is intact bilaterally.   Motor exam: Strength is 4+/5 in all extremities. Tone is normal. No abnormal movements were seen on exam.   Sensory exam reveals normal sense of light touch and pinprick in all extremities.   Deep tendon reflexes:  2+ throughout. Plantar responses are flexor. There is no clonus.   Coordination: shows a normal finger-nose-finger. Normal rapidly alternating movements.   Gait: Not assessed at this time as patient is a fall risk.       NIH Stroke Scale    1a Level of Consciousness   1b Orientation Questions   1c Response to Commands   2 Gaze   3 Visual Fields   4 Facial Movement   5 Motor Function (arm)   a Left   b Right   6 Motor Function (leg)   a Left   b Right   7 Limb Ataxia   8 Sensory   9 Language   10 Articulation   11 Extinction/Inattention     Score: 0      ANCILLARY DATA REVIEWED:     Lab Data  Review:  Recent Results (from the past 24 hour(s))   Hemoglobin A1C    Collection Time: 08/09/20  4:18 AM   Result Value Ref Range    Glycohemoglobin 5.9 (H) 0.0 - 5.6 %    Est Avg Glucose 123 mg/dL   Lipid Profile    Collection Time: 08/09/20  4:18 AM   Result Value Ref Range    Cholesterol,Tot 171 100 - 199 mg/dL    Triglycerides 63 0 - 149 mg/dL    HDL 83 >=40 mg/dL    LDL 75 <100 mg/dL       Labs reviewed by me.       Imaging reviewed by me:     [id number]   Final Result      Multiple dental caries. No evidence of fracture.      DX-CHEST-PORTABLE (1 VIEW)   Final Result      No evidence of acute cardiopulmonary process.      CT-CTA NECK WITH & W/O-POST PROCESSING   Final Result   Addendum 1 of 1   Additionally seen is inflammatory change anterior and inferior to the    right mandible with some gas in that area as well as a thin layer of fluid    measuring up to 4 mm in depth likely representing early premandibular    abscess. There is some phlegmonous    change seen as well as inflammatory change in that area. There is also    periodontal disease present. There some enlarged right anterior chain    cervical lymph nodes.      Final      CT-CTA HEAD WITH & W/O-POST PROCESS   Final Result      No evidence of intracranial vascular occlusion or aneurysm.      CT-CEREBRAL PERFUSION ANALYSIS   Final Result      1.  Cerebral blood flow less than 30% likely representing completed infarct = 0 mL.      2.  T Max more than 6 seconds likely representing combination of completed infarct and ischemia = 0 mL.      3.  Mismatched volume likely representing ischemic brain/penumbra = None      4.  Please note that the cerebral perfusion was performed on the limited brain tissue around the basal ganglia region. Infarct/ischemia outside the CT perfusion sections can be missed in this study.      CT-HEAD W/O   Final Result      1.  No evidence of acute intracranial process.      2.  Cerebral atrophy as well as  periventricular chronic small vessel ischemic change.      MR-BRAIN-W/O    (Results Pending)         Presumed mechanism by TOAST:  __Large Artery Atherosclerosis  __Small Vessel (Lacunar)  __Cardioembolic  __Other (Sickle Cell, Vasculitis, Hypercoagulable)  _X_Unknown    Modified Vianney Scale (MRS): 2 = Slight disability; unable to perform all previous activities but able to look after own affairs without assistance      ASSESSMENT AND PLAN:  85-year old female with PMhx significant for hypertension, TIA/Strokes (remotely) who presented to [facility] on 8/8/20 for a chief complaint of Right facial swelling as well as an episode of unresponsiveness followed by LUE weakness; on arrival here, patient was found by me to be poorly responsive/minimally interactive, not following commands; by the time she returned to her room in the ED, patient awake, following commands, no focal neurological deficits. CT head with no acute intracranial abnormality; did note Right mandibular abscess corellating with Right facial swelling and leukocytosis/possible sepsis, WBC 17.6.  Patient ultimately determined not to be a candidate for IV tPA secondary to low suspicion for stroke, higher suspicion for seizure or syncopal-like event. Patient now s/p Right mandibular I&D yesterday (8/8/) evening; receiving IV Ceftriaxone, repeat CBC pending. EEG, MRI Brain still pending. Exam wise, patient is now at neurological baseline; no focal neurological deficits or additional events x 24 hours.     Impression:   Right mandibular abscess related to poor dentition with associated leukocytosis; s/p I&D (POD#1).  Syncopal event vs seizure with Malik's paralysis vs. ischemic event (less likely); related to the above. Symptoms now resolved.   Hx of TIA/Strokes.   Hypertension.     Recommendations/Plan:     -q4h and PRN neuro assessment. VS per nursing/unit protocol. BP goal < 140/90.   -Telemetry; currently SR. Screen for arrhythmia. Obtain TTE--  ordered, pending. If further concern for syncope, obtain orthostatic VS.   -Obtain MRI Brain wo contrast (though relatively low suspicion for stroke, feel this reasonable given hx of TIA/Stroke and possible new onset seizure)--ordered and pending.   -Obtain routine EEG--ordered and pending, likely to be completed on 8/10/20.    -Check hemoglobin A1c, Lipid panel-- note LDL is 75, Hemoglobin A1c 5.9.   -PT/OT/SLP eval and treat.   -Will follow up with results of the above. All other medical management including management of infection/abscess per primary team.   -DVT PPX: SCDs.     The plan of care above has been discussed with Dr. Jaramillo.     Laquita Montoya, ABIGAIL.P.R.N.  [facility]

## 2020-08-09 NOTE — CARE PLAN
Problem: Infection  Goal: Will remain free from infection  Outcome: PROGRESSING AS EXPECTED  Note: Assisting pt with oral care and administering antibiotics as ordered. Monitoring labs and educating on hand washing.      Problem: Knowledge Deficit  Goal: Knowledge of disease process/condition, treatment plan, diagnostic tests, and medications will improve  Outcome: PROGRESSING AS EXPECTED  Note: Pt and daughter updated on POC and pending diagnostic tests.

## 2020-08-09 NOTE — OP REPORT
DATE OF SERVICE:  08/08/2020    PREOPERATIVE DIAGNOSES:  1.  Right mandibular buccal space abscess.  2.  Decayed and infected tooth #30.  3.  Advanced dental decay, teeth numbers 3, 4, 5, 12, 18, and 19.    POSTOPERATIVE DIAGNOSES:  1.  Right mandibular buccal space abscess.  2.  Decayed and infected tooth #30.  3.  Advanced dental decay, teeth numbers 3, 4, 5, 12, 18, and 19.    PROCEDURES:  1.  Intraoral incision and drainage of the right mandibular buccal space   abscess with Penrose drain placement.  2.  Extraction of teeth # 3, 4, 5, 12, 18, 19, and 30.    SURGEON:  Loc Dc MD, DDS    ASSISTANT SURGEON:  None.    ANESTHESIA:  General anesthesia with oral endotracheal intubation.    ANESTHESIOLOGIST:  Scott Cuevas MD    INDICATION:  This patient is an 85-year-old woman with hypertension and   cerebrovascular disease who presented to the [facility] Emergency Department today   after an episode of syncope for rule out stroke and for evaluation of her   right lower facial swelling.  Oral surgery consultation was called.  I saw the   patient, she was found to have a right mandibular buccal space abscess   associated with decayed infected tooth #30.  She was also found to have   multiple other teeth with advanced decay and chronic infection.  Intraoral   incision and drainage of her abscess was recommended under general anesthesia   with extraction of teeth numbers 3, 4, 5, 18, 19, and 30.  She was noted to be   febrile with an elevated white blood cell count and neutrophilia.  Urgent   treatment was indicated.  The patient was met n.p.o. status and was scheduled   for the operating room for the recommended procedure.  The risks, benefits,   and alternatives were described and discussed in detail.  All questions were   answered and formal written consent was obtained to proceed.    PROCEDURE DESCRIPTION:  The patient was [place] [facility] [place]  afternoon of 08/08/2020.  She   was placed in supine position and general anesthesia with oral endotracheal   intubation was performed without complication by Dr. Cuevas.  The patient   was positioned, prepped, and draped in the usual fashion for an intraoral   incision and drainage and dental extraction procedure.  IV antibiotics had   been administered preoperatively.    The oropharynx was suctioned and a throat pack was placed.  Attention was   turned to the right lower quadrant of the mouth.  A 1 cm incision was made   through mucosa and submucosa into an abscess cavity, which tracked anteriorly.    Approximately 2 mL of pus was drained.  Loculations were explored bluntly   with a curette and further drainage was achieved.  A smaller incision was made   anteriorly into the cavity of the abscess.  The site was flushed copiously   with normal saline and a quarter-inch Penrose drain was placed into the   anterior portion of the incision and tacked into place with a 3-0 chromic gut   suture.  Tooth #30 was then extracted with periodontal separation, root   separation using forceps and removal of the roots individually with elevators   and a rongeur.  The socket was curetted thoroughly of apical granulation   tissue and was irrigated with saline.  The socket was then tacked closed with   interrupted 3-0 chromic gut sutures.    Attention was then turned to the other decayed teeth.  Teeth #3, #4, and #5   were extracted with periodontal separation, luxation of the roots with an   elevator, and removal with forceps and a rongeur.  Bone was smoothed with a   rongeur.  The sockets were curetted thoroughly and irrigated with saline.  The   soft tissues were tacked with interrupted 3-0 chromic gut sutures.  Tooth #12   was extracted with periodontal separation, forceps luxation and removal of 2   roots individually.  Socket was curetted and irrigated.  Attention was then   turned to the lower left quadrant where teeth #18  was noted to be vertically   fractured with extensive granulation tissue.  This tooth was elevated from the   socket in fragments and removed.  Heavy granulation tissue was curetted   thoroughly.  Tooth #19 was then extracted with a root separation using a   cowhorn forceps followed by root luxation with elevators and removal with   forceps.  Sockets were curetted thoroughly and irrigated with saline.  The   soft tissues were tacked with interrupted 3-0 chromic gut sutures.    The throat pack was removed and the oropharynx was suctioned.  Gauze was   placed over the extraction sockets for hemostasis.  The patient undraped and   cleansed and turned over to anesthesia for emergence and extubation.  This was   performed without complication by Dr. Cuevas.  The patient was transferred   to the PACU awake and in stable condition.    ESTIMATED BLOOD LOSS:  Minimal.    SPECIMENS:  None (teeth discarded).    DRAINS:  A Penrose drain placed into the right lower buccal space abscess.    COMPLICATIONS:  None.    DISPOSITION:  After recovery in the PACU, the patient will be admitted to the   floor under the care of the medicine service for postoperative pain control,   IV antibiotics, and observation.  Anticipate a 24-48 hour stay for IV   antibiotics.  Her drain can be removed prior to discharge.  She can continue a   5-day course of oral antibiotics.       ____________________________________     VINCENT SQUIRES MD,DDS    JULIANNE / ROXANA    DD:  08/08/2020 20:38:28  DT:  08/08/2020 23:30:33    D#:  [id number]  Job#:  [id number]

## 2020-08-09 NOTE — CARE PLAN
Problem: Safety  Goal: Will remain free from falls  Outcome: PROGRESSING AS EXPECTED  Note: Bed alarm on, wheels locked, in lowest position. Non skid socks applied. Call light within reach. Pt verbalizes understanding of use of call light.      Problem: Pain  Goal: Alleviation of Pain or a reduction in pain to the patient's comfort goal  Outcome: PROGRESSING AS EXPECTED  Note: Pt denies pain or discomfort at this time post oral surgery. No grimacing or labored breathing noted. Will continue to assess and monitor.      Problem: Self Care Deficit  Goal: Ability to toilet independently or with assistance  Outcome: PROGRESSING AS EXPECTED  Note: Pt able to ambulate with FWW and minimal assistance to restroom.

## 2020-08-09 NOTE — ANESTHESIA PROCEDURE NOTES
Airway    Date/Time: 8/8/2020 5:46 PM  Performed by: Scott Cuevas M.D.  Authorized by: Scott Cuevas M.D.     Location:  OR  Urgency:  Elective  Indications for Airway Management:  Anesthesia      Spontaneous Ventilation: absent    Sedation Level:  Deep  Preoxygenated: Yes    Patient Position:  Sniffing  Mask Difficulty Assessment:  1 - vent by mask  Final Airway Type:  Endotracheal airway  Final Endotracheal Airway:  ETT  Cuffed: Yes    Technique Used for Successful ETT Placement:  Direct laryngoscopy    Insertion Site:  Oral  Blade Type:  Allen  Laryngoscope Blade/Videolaryngoscope Blade Size:  2  ETT Size (mm):  7.5  Measured from:  Teeth  ETT to Teeth (cm):  22  Placement Verified by: auscultation and capnometry    Cormack-Lehane Classification:  Grade IIb - view of arytenoids or posterior of glottis only  Number of Attempts at Approach:  1

## 2020-08-09 NOTE — CONSULTS
DATE OF SERVICE:  08/08/2020    ORAL SURGERY CONSULTATION    REQUESTING PHYSICIAN:  Cameron Kirk MD; [facility] Emergency Department.    REASON FOR CONSULTATION:  An 85-year-old woman with a 2-week history of right   mandibular region pain and swelling.    IDENTIFICATION/HISTORY OF PRESENT ILLNESS:  This patient is an 85-year-old   woman who presented to the [facility] Emergency Department   on 08/08/2020 with a 2-week history of slowly worsening right mandibular pain   and swelling.  She had an episode of syncope and was brought to [facility] for   evaluation to rule out a stroke.  She was found to have a transient weakness   which subsided.  She was admitted to the medicine service for continuation of   stroke workup.  An oral surgery consultation was called for evaluation and   management of the right mandibular swelling which was felt due to dental   infection considering multiple carious teeth and a history of dental pain.    The patient was seen and evaluated.  She was complaining of right jaw pain and   multiple sharp teeth.  She was aware of poor dentition over the last several   years.  She complained that the painful swelling over the right lower jaw was   worsening.    PAST MEDICAL HISTORY:  Hypertension, stroke, TIAs.    PAST SURGICAL HISTORY:  ORIF, right ankle in 2017.  Intramedullary nailing of   the right hip in 2018.  Gastroendoscopy in 2019.  Colonoscopy 2019.    MEDICATIONS:  1.  Amlodipine.  2.  Citalopram.    ALLERGIES:  No known drug allergies.    REVIEW OF SYSTEMS:  Patient denies cardiovascular, respiratory, GI, , or   neurologic disease.  She is somewhat confused, but seemed to understand a   discussion regarding her infection and recommended surgical treatment.    FAMILY HISTORY:  Cancer in her mother and sister.  Otherwise, detailed history   hard to elicit.    SOCIAL HISTORY:  Patient denies smoking, drinking alcohol or usage of illicit   drugs.    PHYSICAL   EXAMINATION:  VITAL SIGNS:  Temperature in the preoperative area 101.7, blood pressure   138/62, pulse 79, respirations 15, oxygen saturation 94% on room air.  GENERAL:  The patient is an elderly, frail, age-appropriate woman in mild   distress.  She converses clearly, however, is occasionally confused.  She   appears frail with obvious right lower facial erythematous, tender swelling.  HEENT:  Head is atraumatic and normocephalic.  The pupils are equally round   and reactive with extraocular movements intact.  Hearing is grossly intact.    The nares are patent.  No trismus.  There is tender indurated swelling of the   right lateral mandibular body region.  Intraorally, the oropharynx is clear   and the floor of mouth is soft.  There are multiple decayed teeth including   numbers 3, 4, 5, 12, 18, 19, and 30.  Tooth #30 is acutely tender and there is   a right lower buccal vestibular tender fluctuant swelling palpable.  NECK:  Supple, with good range of motion.  The trachea is in the midline.  No   submandibular indurated swelling.  CARDIOVASCULAR:  Regular rate and rhythm without murmurs by report.  PULMONARY:  Breathing clear and unlabored.  No wheezes or rhonchi by report.  ABDOMEN:  Benign.  EXTREMITIES:  Warm without edema.  NEUROLOGIC:  Patient is alert.  Cranial nerves II-XII grossly intact.  Speech   is clear.  Nonfocal exam by the medicine team or myself.    LABORATORY DATA:  1.  CBC with white blood cell count of 17.6, hematocrit 36.1, and platelets   360.  2.  Complete metabolic panel significant for abnormal values of glucose 204,   BUN 26.  3.  Coagulation studies within normal limits.    IMAGIN.  CTA head:  No evidence of intracranial vascular occlusion or aneurysm.  2.  CT head:  No evidence of acute intracranial process.  Cerebral atrophy as   well as periventricular chronic small vessel ischemic change.  3.  Panoramic mandibular x-ray:  Shows multiple decayed teeth with advanced   decay, teeth  numbers 3, 4, 5, 18, 19, and 30.  Apical lucency tooth #30    ASSESSMENT AND PLAN:  This 85-year-old woman with hypertension and   cerebrovascular disease has developed a right mandibular buccal space abscess   as a result of decayed infected tooth #30.  She has other teeth with advanced   decay and would benefit from extraction of the decayed teeth with sharp edges   traumatizing her tongue.  I have recommended intraoral incision and drainage   of the abscess with extraction of teeth numbers 3, 4, 5, 18, 19 and #30.  The   patient is amenable to this treatment and gives verbal and written consent to   proceed.  The procedure, risks, benefits, and alternatives were described and   discussed in detail.  All questions were answered and the patient was   scheduled for the operating room.  She will be kept overnight for IV   antibiotics and pain control under the care of the medicine service.       ____________________________________     VINCENT SQUIRES MD,MARIVELS    JULIANNE / ROXANA    DD:  08/08/2020 20:29:02  DT:  08/09/2020 01:04:32    D#:  [id number]  Job#:  [id number]

## 2020-08-09 NOTE — THERAPY
Physical Therapy   Initial Evaluation     Patient Name: Rebecca Bishop  Age:  85 y.o., Sex:  female  Medical Record #: [id number]  Today's Date: 8/9/2020     Precautions: Fall Risk, Swallow Precautions ( See Comments)    Assessment  Patient is 85 y.o. female with a diagnosis of abscess of jaw.  PMHx includes HTN, cerebrovascular disease.  Patient demonstrates household ambulation status during physical therapy evaluation.  Overall good safety awareness with limitations in LE strength.  Home health would benefit patient in order to reduce fall risk and reduce reliance of SPC, which is patient's goal.  No further acute care physical therapy needs.        08/09/20 1055   Initial Contact Note    Initial Contact Note Order Received and Verified, Evaluation Only - Patient Does Not Require Further Acute Physical Therapy at this Time.  However, May Benefit from Post Acute Therapy for Higher Level Functional Deficits.   Precautions   Precautions Fall Risk;Swallow Precautions ( See Comments)   Non Verbal Descriptors   Non Verbal Scale  Calm   Prior Living Situation   Prior Services Other (Comments)  (Independent living)   Housing / Facility 1 Story Apartment / Condo   Steps Into Home 0   Steps In Home 0   Equipment Owned Single Point Cane   Lives with - Patient's Self Care Capacity Unrelated Adult  (roommate)   Comments cooking/cleaning performed for her, I living takes her to grocery store   Prior Level of Functional Mobility   Bed Mobility Independent   Transfer Status Independent   Ambulation Independent   Distance Ambulation (Feet) 150   Assistive Devices Used Single Point Cane   Stairs Required Assist   Cognition    Cognition / Consciousness WDL   Level of Consciousness Alert   Passive ROM Lower Body   Passive ROM Lower Body WDL   Active ROM Lower Body    Active ROM Lower Body  WDL   Strength Lower Body   Lower Body Strength  X   Comments minimally diminished LE strength   Sensation Lower Body   Lower Extremity Sensation   WDL    Balance Assessment   Sitting Balance (Static) Fair +   Sitting Balance (Dynamic) Fair +   Standing Balance (Static) Fair   Standing Balance (Dynamic) Fair   Weight Shift Sitting Fair   Weight Shift Standing Fair   Comments SPC   Gait Analysis   Gait Level Of Assist Supervised   Assistive Device Single Point Cane   Distance (Feet) 80   # of Times Distance was Traveled 1   # of Stairs Climbed 0   Weight Bearing Status fwb   Skilled Intervention Verbal Cuing   Bed Mobility    Supine to Sit Supervised   Sit to Supine Supervised   Scooting Supervised   Functional Mobility   Sit to Stand Supervised   How much difficulty does the patient currently have...   Turning over in bed (including adjusting bedclothes, sheets and blankets)? 3   Sitting down on and standing up from a chair with arms (e.g., wheelchair, bedside commode, etc.) 3   Moving from lying on back to sitting on the side of the bed? 3   How much help from another person does the patient currently need...   Moving to and from a bed to a chair (including a wheelchair)? 4   Need to walk in a hospital room? 4   Climbing 3-5 steps with a railing? 3   6 clicks Mobility Score 20   Education Group   Education Provided Role of Physical Therapist;Gait Training;Use of Assistive Device   Role of Physical Therapist Patient Response Patient;Acceptance;Demonstration;Explanation;Verbal Demonstration;Action Demonstration   Gait Training Patient Response Patient;Acceptance;Demonstration;Explanation;Verbal Demonstration;Action Demonstration   Use of Assistive Device Patient Response Patient;Acceptance;Demonstration;Explanation;Verbal Demonstration;Action Demonstration   Anticipated Discharge Equipment and Recommendations   DC Equipment Recommendations None   Discharge Recommendations Recommend home health for continued physical therapy services   Interdisciplinary Plan of Care Collaboration   IDT Collaboration with  Nursing   Patient Position at End of Therapy Seated;In Bed;Call  Light within Reach;Tray Table within Reach;Phone within Reach   Collaboration Comments results of PT       Plan    Recommend Physical Therapy for Evaluation only     DC Equipment Recommendations: None  Discharge Recommendations: Recommend home health for continued physical therapy services,

## 2020-08-09 NOTE — PROGRESS NOTES
Oral Surgery Progress    POD #1  Operation:  Intraoral incision and drainage of the mandibular buccal space abscess and extraction of decayed teeth.  Complaints:  Soreness over the right mandibular angle region.   Issues overnight:  None.  Stroke ruled out.  Pt on the Neuro floor.   Pt is ambulating, voiding, and tolerating PO.  Pain is controlled with oral meds.    Exam:  AF, VSSN  Pt appears well in no distress.  Surgical sites clean.  No discharge or bleeding.  Drain removed.  Swelling improved, but erythema localizing over the right mandibular body region with persistent induration.    Labs:  None new.    A/P:  Pt doing well POD #1 s/p extraction and I&D abscess and meets criteria for discharge, but considering her age and mental status, observation for another day and to receive antibiotics justified.      Discharge home tomorrow with oral care supplies.  Warm compresses to swollen areas 2-3 times daily for 5 days.  Diet soft.  Salt water rinses 3-4 times daily.  Brush teeth BID carefully around sutured sites.    Follow-up:  1 week in office in office.      Thanks for your assistance.    Loc Dc DDS, MD  [phone]

## 2020-08-09 NOTE — ANESTHESIA PREPROCEDURE EVALUATION
Relevant Problems   PULMONARY   (+) JOVEL (dyspnea on exertion)      NEURO   (+) History of CVA      CARDIAC   (+) JOVEL (dyspnea on exertion)   (+) Essential hypertension       Physical Exam    Airway   Mallampati: II  TM distance: >3 FB  Neck ROM: full       Cardiovascular - normal exam  Rhythm: regular  Rate: normal  (-) murmur     Dental - normal exam      Very poor dentition   Pulmonary - normal exam  Breath sounds clear to auscultation     Abdominal    Neurological - normal exam                 Anesthesia Plan    ASA 3- EMERGENT   ASA physical status 3 criteria: CVA or TIA - history (> 3 months)ASA physical status emergent criteria: impending airway compromise    Plan - general       Airway plan will be ETT        Induction: intravenous    Postoperative Plan: Postoperative administration of opioids is intended.    Pertinent diagnostic labs and testing reviewed    Informed Consent:    Anesthetic plan and risks discussed with patient.    Use of blood products discussed with: patient whom consented to blood products.

## 2020-08-09 NOTE — ANESTHESIA POSTPROCEDURE EVALUATION
Patient: Rebecca Bishop    Procedure Summary     Date: 08/08/20 Room / Location: [place] [address] / SURGERY [facility]    Anesthesia Start: 1730 Anesthesia Stop: 1900    Procedures:       INCISION AND DRAINAGE, ABSCESS, SUBMANDIBULAR REGION (N/A Mouth)      EXTRACTION, TOOTH (N/A Mouth) Diagnosis: (infected teeth)    Surgeon: Loc Dc M.D. Responsible Provider: Scott Cuevas M.D.    Anesthesia Type: general ASA Status: 3 - Emergent          Final Anesthesia Type: general  Last vitals  BP   Blood Pressure : 106/69    Temp   (!) 38.7 °C (101.7 °F)    Pulse   Pulse: 76   Resp   19    SpO2   97 %      Anesthesia Post Evaluation    Patient location during evaluation: PACU  Patient participation: complete - patient participated  Level of consciousness: awake and alert  Pain score: 1    Airway patency: patent  Anesthetic complications: no  Cardiovascular status: hemodynamically stable  Respiratory status: acceptable  Hydration status: euvolemic    PONV: none           Nurse Pain Score: 4 (NPRS)

## 2020-08-09 NOTE — ANESTHESIA TIME REPORT
Anesthesia Start and Stop Event Times     Date Time Event    8/8/2020 1725 Ready for Procedure     1730 Anesthesia Start     1900 Anesthesia Stop        Responsible Staff  08/08/20    Name Role Begin End    Scott Cuevas M.D. Anesth 1730 1900        Preop Diagnosis (Free Text):  Pre-op Diagnosis     infected teeth        Preop Diagnosis (Codes):    Post op Diagnosis  Dental abscess      Premium Reason  E. Weekend    Comments: emergency

## 2020-08-09 NOTE — OR NURSING
Vss. Pt is confused but denies pain. Appears in no acute distress.   Pt spit out gauze that was placed in mouth by surgeon and will not leave oxygen mask in place.  Replaced with NC - pt tolerating well. No bleeding in mouth noted.

## 2020-08-09 NOTE — THERAPY
"Speech Language Pathology   Clinical Swallow Evaluation     Patient Name: Rebecca Bishop  AGE:  85 y.o., SEX:  female  Medical Record #: [id number]  Today's Date: 8/9/2020     Precautions  Precautions: (P) Swallow Precautions ( See Comments)    Assessment    86 YO female admitted 8/8/20 2/2 right jaw pain and swelling. PMHx: HTN, CVA. CMHx: mandibular abscess, sepsis, loss of consciousness, essential HTN. Head CT 8/8/2020 \"No evidence of intracranial vascular occlusion or aneurysm.\" CXR 8/8/2020 \"No evidence of acute cardiopulmonary process.\"    Pt seen this date for clinical swallow evaluation 2/2 mandibular abscess. Oral mech exam revealed right-sided facial swelling, and restricted retraction and protrusion of right sided labial structures. Dentition intact. PO trials of ice, MTL by cup and straw, liquidized, soft and bite sized and thins by cup and straw assessed. Hyolaryngeal elevation palpated as complete, timely to 2-3 second delayed initiation of swallow appreciated and vocal quality remained clear throughout. Pt reporting increased pain with mastication of soft and bite sized trial and declined further PO trials. No other s/sx of aspiration appreciated with any other consistencies consumed.     Recommend initiation of puree/thins with adherence to the following: slow rate, HOB at 90*, liquids via straw, meds as tolerated. RN to place diet order at 24 hours post surgery per MD.     Plan    Recommend Speech Therapy 3 times per week until therapy goals are met for the following treatments:  Dysphagia Training and Patient / Family / Caregiver Education.         Subjective    Pt was awake, alert, followed directions and participated fully in evaluation.      Objective       08/09/20 0857   Oral Motor Eval    Is Patient Able to Complete Oral Motor Eval Yes but Impaired   Labial Function   Labial Structure At Rest Moderate  (right sided facial swelling)   Labial Vowel Production / I /, / U / Minimal   Labial Sequence / " I /, / U / Minimal   Frown, Pucker Within Functional Limits   Lingual Function   Lingual Structure At Rest Within Functional Limits   Lingual Protrude Within Functional Limits   Elevate Outside Mouth Within Functional Limits   Lateralization No Impairment Right;No Impairment Left   / Pa / 5X's Minimal   Jaw   Jaw Structure At Rest Within Functional Limits   Bite ([place]) Not Tested   Chew ([place]) Moderate  (significant pain with mastication)   Jaw Open / Resist Within Functional Limits   Jaw Close / Resist Within Functional Limits   Velar Function   Velar Structure At Rest Within Functional Limits   / A / Prolonged Within Functional Limits   / A /  5X's Within Functional Limits   Laryngeal Function   Voice Quality Within Functional Limits   Volutional Cough Within Functional Limits   Excursion Upon Swallow Complete   Oral Food Presentation   Ice Chips Within Functional Limits   Single Swallow Mildly Thick (2) - (Nectar Thick)  Within Functional Limits   Serial Swallow Mildly Thick (2) - (Nectar Thick) Within Functional Limits   Single Swallow Thin (0) Within Functional Limits   Serial Swallow Thin (0) Within Functional Limits   Liquidised (3) Within Functional Limits   Soft & Bite-Sized (6) - (Dysphagia III) Moderate  (pt r/o pain with mastication, declined further trials)   Regular (7) Not Tested   Self Feeding Independent   Tracheostomy   Tracheostomy  No   Dysphagia Strategies / Recommendations   Strategies / Interventions Recommended (Yes / No) Yes   Compensatory Strategies Monitor During Meals;Head of Bed 90 Degrees During Eating / Drinking;Liquids Via Straw;Single Sips / Bites;No Talking During Eating / Drinking   Diet / Liquid Recommendation Thin (0);Puree (4)   Medication Administration  Whole with Liquid Wash;Float Whole with Puree   Therapy Interventions Dysphagia Therapy By Speech Language Pathologist   Short Term Goals   Short Term Goal # 1 Pt will consume a diet of puree/thins with no s/sx of  aspiration and min cues.

## 2020-08-09 NOTE — OR SURGEON
Immediate Post OP Note    PreOp Diagnosis:   1.  Right mandibular buccal space abscess.  2.  Decayed and infected tooth number 30.  3.  Advanced dental decay teeth numbers 3, 4, 5, 12, 18, and 19.    PostOp Diagnosis:   1.  Right mandibular buccal space abscess.  2.  Decayed and infected tooth number 30.  3.  Advanced dental decay teeth numbers 3, 4, 5, 12, 18, and 19.    Procedure(s):  1.  Incision and drainage of the right mandibular buccal space abscess with Penrose drain placement.   2.  Extraction of teeth numbers 3,4, 5, 12, 18, 19, and 30.    - Wound Class: Dirty or Infected    Surgeon(s):  Loc Dc M.D.    Assistant Surgeon:  None    Anesthesiologist/Type of Anesthesia:  Anesthesiologist: Scott Cuevas M.D./General    Surgical Staff:  Circulator: Lisha Sheffield R.N.  Scrub Person: Joaquin Snider    Specimens removed if any:   None (teeth discarded)  * No specimens in log *    Drains:  1 cm Penrose drain.    Estimated Blood Loss: Minimal.    Findings: Multiple decayed teeth.  2 cc pus drained from the mandibular buccal space.  Vertical fracture of tooth number 18 with pus.      Complications: None.          8/8/2020 6:59 PM Loc Dc M.D.

## 2020-08-09 NOTE — CARE PLAN
Problem: Safety  Goal: Will remain free from falls  Outcome: PROGRESSING AS EXPECTED  Note: Fall precautions in place.     Problem: Venous Thromboembolism (VTW)/Deep Vein Thrombosis (DVT) Prevention:  Goal: Patient will participate in Venous Thrombosis (VTE)/Deep Vein Thrombosis (DVT)Prevention Measures  Outcome: PROGRESSING AS EXPECTED  Flowsheets (Taken 8/8/2020 2659)  Mechanical Prophylaxis: SCDs, Sequential Compression Device  SCDs, Sequential Compression Device: On

## 2020-08-09 NOTE — PROGRESS NOTES
[facility] Medicine Daily Progress Note    Date of Service  8/9/2020    Chief Complaint  85 y.o. female admitted 8/8/2020 with right mandibular pain and swelling    Hospital Course    *85-year-old female with past medical history of hypertension and stroke presented to the hospital on August 8, 2020 with right mandibular pain and swelling.  On arrival paramedics witnessed a transient loss of consciousness.  A code stroke was called and she underwent extensive imaging.  She underwent CT head and neck did not show any acute abnormalities.  She found to have swelling on her right side of the face and oral surgery Dr. Dc was consulted for surgical intervention.  She underwent Incision and drainage of the right mandibular buccal space abscess with Penrose drain placement. Extraction of teeth numbers 3,4, 5, 12, 18, 19, and 30.  Neurology evaluated her and ordered MRI brain without contrast and EEG.*      Interval Problem Update  I evaluated and examined her at the bedside  She denies any acute complaints  She reported her jaw pain has improved  Oral surgery evaluated her and made recommendations.  Currently EEG is pending    Consultants/Specialty  Neurology  Oral surgery    Code Status  Full Code    Disposition  To be decided    Review of Systems  Review of Systems   Constitutional: Negative for chills, fever and weight loss.   HENT: Negative for hearing loss and tinnitus.         Jaw pain and swelling   Eyes: Negative for blurred vision, double vision, photophobia and pain.   Respiratory: Negative for cough, sputum production and shortness of breath.    Cardiovascular: Negative for chest pain, palpitations, orthopnea and leg swelling.   Gastrointestinal: Negative for abdominal pain, constipation, diarrhea, nausea and vomiting.   Genitourinary: Negative for dysuria, frequency and urgency.   Musculoskeletal: Negative for back pain, joint pain, myalgias and neck pain.   Skin: Negative for rash.   Neurological: Negative for  dizziness, tingling, tremors, sensory change, speech change, focal weakness and headaches.   Psychiatric/Behavioral: Negative for hallucinations and substance abuse.   All other systems reviewed and are negative.       Physical Exam  Temp:  [36.2 °C (97.1 °F)-38.8 °C (101.8 °F)] 36.4 °C (97.6 °F)  Pulse:  [69-89] 69  Resp:  [13-24] 15  BP: ()/(42-81) 106/54  SpO2:  [91 %-99 %] 91 %    Physical Exam  Vitals signs reviewed.   Constitutional:       General: She is not in acute distress.     Appearance: Normal appearance. She is not ill-appearing.   HENT:      Head: Normocephalic and atraumatic.      Nose: No congestion.   Eyes:      General:         Right eye: No discharge.         Left eye: No discharge.      Pupils: Pupils are equal, round, and reactive to light.   Neck:      Musculoskeletal: Normal range of motion. No neck rigidity.      Comments: Right jaw swelling  Cardiovascular:      Rate and Rhythm: Normal rate and regular rhythm.      Pulses: Normal pulses.      Heart sounds: Normal heart sounds. No murmur.   Pulmonary:      Effort: Pulmonary effort is normal. No respiratory distress.      Breath sounds: Normal breath sounds. No stridor.   Abdominal:      General: Bowel sounds are normal. There is no distension.      Palpations: Abdomen is soft.      Tenderness: There is no abdominal tenderness.   Musculoskeletal: Normal range of motion.         General: No swelling or tenderness.   Skin:     General: Skin is warm.      Capillary Refill: Capillary refill takes less than 2 seconds.      Coloration: Skin is not jaundiced or pale.      Findings: No bruising.   Neurological:      General: No focal deficit present.      Mental Status: She is alert.      Cranial Nerves: No cranial nerve deficit.      Comments: She knows that she is in the hospital.  She was able to follow commands per  No focal neurological deficit.   Psychiatric:         Mood and Affect: Mood normal.         Behavior: Behavior normal.          Cognition and Memory: Cognition is impaired. Memory is impaired. She exhibits impaired recent memory and impaired remote memory.         Fluids    Intake/Output Summary (Last 24 hours) at 8/9/2020 1502  Last data filed at 8/8/2020 2130  Gross per 24 hour   Intake 1100 ml   Output 10 ml   Net 1090 ml       Laboratory  Recent Labs     08/08/20  0915   WBC 17.6*   RBC 4.24   HEMOGLOBIN 11.4*   HEMATOCRIT 36.1*   MCV 85.1   MCH 26.9*   MCHC 31.6*   RDW 49.3   PLATELETCT 360   MPV 9.7     Recent Labs     08/08/20  0915   SODIUM 138   POTASSIUM 4.0   CHLORIDE 101   CO2 20   GLUCOSE 204*   BUN 26*   CREATININE 1.04   CALCIUM 9.2     Recent Labs     08/08/20  0915   APTT 35.9   INR 1.06         Recent Labs     08/09/20  0418   TRIGLYCERIDE 63   HDL 83   LDL 75       Imaging  MR-BRAIN-W/O   Final Result      1.  No acute abnormality.   2.  Moderate cerebral atrophy.   3.  Moderate chronic microvascular ischemic disease.   4.  There has been no significant interval change.      [id number]   Final Result      Multiple dental caries. No evidence of fracture.      DX-CHEST-PORTABLE (1 VIEW)   Final Result      No evidence of acute cardiopulmonary process.      CT-CTA NECK WITH & W/O-POST PROCESSING   Final Result   Addendum 1 of 1   Additionally seen is inflammatory change anterior and inferior to the    right mandible with some gas in that area as well as a thin layer of fluid    measuring up to 4 mm in depth likely representing early premandibular    abscess. There is some phlegmonous    change seen as well as inflammatory change in that area. There is also    periodontal disease present. There some enlarged right anterior chain    cervical lymph nodes.      Final      CT-CTA HEAD WITH & W/O-POST PROCESS   Final Result      No evidence of intracranial vascular occlusion or aneurysm.      CT-CEREBRAL PERFUSION ANALYSIS   Final Result      1.  Cerebral blood flow less than 30% likely representing completed infarct = 0  mL.      2.  T Max more than 6 seconds likely representing combination of completed infarct and ischemia = 0 mL.      3.  Mismatched volume likely representing ischemic brain/penumbra = None      4.  Please note that the cerebral perfusion was performed on the limited brain tissue around the basal ganglia region. Infarct/ischemia outside the CT perfusion sections can be missed in this study.      CT-HEAD W/O   Final Result      1.  No evidence of acute intracranial process.      2.  Cerebral atrophy as well as periventricular chronic small vessel ischemic change.           Assessment/Plan  * Mandibular abscess- (present on admission)  Assessment & Plan  With facial cellulitis  Likely dental source given her severe dental decay  IV Unasyn   oral surgery Dr. Dc was consulted for surgical intervention.  She underwent Incision and drainage of the right mandibular buccal space abscess with Penrose drain placement. Extraction of teeth numbers 3,4, 5, 12, 18, 19, and 30 on August 8, 2020  Continue provided pain control.    Sepsis (HCC)- (present on admission)  Assessment & Plan  This is Sepsis Present on admission  SIRS criteria identified on my evaluation include: Leukocytosis, with WBC greater than 12,000  Source is mandibular abscess  Sepsis protocol initiated  Fluid resuscitation ordered per protocol  IV antibiotics as appropriate for source of sepsis: IV Unasyn  While organ dysfunction may be noted elsewhere in this problem list or in the chart, degree of organ dysfunction does not meet CMS criteria for severe sepsis    Continue antibiotics per  She remains afebrile.  Cultures negative to date.        Loss of consciousness (HCC)- (present on admission)  Assessment & Plan  Transient episode witnessed by paramedics  Now resolved.  Imaging studies do not show any acute or maladies per  MRI brain without contrast showed no acute abnormalities.  EEG is currently pending  Neurology is following her.      Essential  hypertension- (present on admission)  Assessment & Plan  Continue norvasc with holding parameters       VTE prophylaxis: SCDs

## 2020-08-10 ENCOUNTER — PATIENT OUTREACH (OUTPATIENT)
Dept: HEALTH INFORMATION MANAGEMENT | Facility: OTHER | Age: 85
End: 2020-08-10

## 2020-08-10 PROBLEM — M27.2 MANDIBULAR ABSCESS: Status: RESOLVED | Noted: 2020-08-08 | Resolved: 2020-08-10

## 2020-08-10 LAB
ANION GAP SERPL CALC-SCNC: 13 MMOL/L (ref 7–16)
BUN SERPL-MCNC: 16 MG/DL (ref 8–22)
CALCIUM SERPL-MCNC: 8.6 MG/DL (ref 8.5–10.5)
CHLORIDE SERPL-SCNC: 105 MMOL/L (ref 96–112)
CO2 SERPL-SCNC: 23 MMOL/L (ref 20–33)
CREAT SERPL-MCNC: 0.68 MG/DL (ref 0.5–1.4)
ERYTHROCYTE [DISTWIDTH] IN BLOOD BY AUTOMATED COUNT: 49 FL (ref 35.9–50)
GLUCOSE BLD-MCNC: 104 MG/DL (ref 65–99)
GLUCOSE BLD-MCNC: 126 MG/DL (ref 65–99)
GLUCOSE BLD-MCNC: 88 MG/DL (ref 65–99)
GLUCOSE SERPL-MCNC: 93 MG/DL (ref 65–99)
HCT VFR BLD AUTO: 31.3 % (ref 37–47)
HGB BLD-MCNC: 9.9 G/DL (ref 12–16)
MAGNESIUM SERPL-MCNC: 2 MG/DL (ref 1.5–2.5)
MCH RBC QN AUTO: 27.4 PG (ref 27–33)
MCHC RBC AUTO-ENTMCNC: 31.6 G/DL (ref 33.6–35)
MCV RBC AUTO: 86.7 FL (ref 81.4–97.8)
PLATELET # BLD AUTO: 331 K/UL (ref 164–446)
PMV BLD AUTO: 9.6 FL (ref 9–12.9)
POTASSIUM SERPL-SCNC: 3.5 MMOL/L (ref 3.6–5.5)
RBC # BLD AUTO: 3.61 M/UL (ref 4.2–5.4)
SODIUM SERPL-SCNC: 141 MMOL/L (ref 135–145)
WBC # BLD AUTO: 11 K/UL (ref 4.8–10.8)

## 2020-08-10 PROCEDURE — 80048 BASIC METABOLIC PNL TOTAL CA: CPT

## 2020-08-10 PROCEDURE — 95816 EEG AWAKE AND DROWSY: CPT | Performed by: PSYCHIATRY & NEUROLOGY

## 2020-08-10 PROCEDURE — 99232 SBSQ HOSP IP/OBS MODERATE 35: CPT | Performed by: INTERNAL MEDICINE

## 2020-08-10 PROCEDURE — 4A10X4Z MONITORING OF CENTRAL NERVOUS ELECTRICAL ACTIVITY, EXTERNAL APPROACH: ICD-10-PCS | Performed by: PSYCHIATRY & NEUROLOGY

## 2020-08-10 PROCEDURE — 82962 GLUCOSE BLOOD TEST: CPT | Mod: 91

## 2020-08-10 PROCEDURE — 36415 COLL VENOUS BLD VENIPUNCTURE: CPT

## 2020-08-10 PROCEDURE — 770006 HCHG ROOM/CARE - MED/SURG/GYN SEMI*

## 2020-08-10 PROCEDURE — 700102 HCHG RX REV CODE 250 W/ 637 OVERRIDE(OP): Performed by: INTERNAL MEDICINE

## 2020-08-10 PROCEDURE — 700111 HCHG RX REV CODE 636 W/ 250 OVERRIDE (IP): Performed by: NURSE PRACTITIONER

## 2020-08-10 PROCEDURE — 85027 COMPLETE CBC AUTOMATED: CPT

## 2020-08-10 PROCEDURE — 92526 ORAL FUNCTION THERAPY: CPT

## 2020-08-10 PROCEDURE — A9270 NON-COVERED ITEM OR SERVICE: HCPCS | Performed by: INTERNAL MEDICINE

## 2020-08-10 PROCEDURE — 83735 ASSAY OF MAGNESIUM: CPT

## 2020-08-10 PROCEDURE — 700102 HCHG RX REV CODE 250 W/ 637 OVERRIDE(OP): Performed by: HOSPITALIST

## 2020-08-10 PROCEDURE — 700105 HCHG RX REV CODE 258: Performed by: HOSPITALIST

## 2020-08-10 PROCEDURE — 700111 HCHG RX REV CODE 636 W/ 250 OVERRIDE (IP): Performed by: HOSPITALIST

## 2020-08-10 PROCEDURE — 95816 EEG AWAKE AND DROWSY: CPT | Mod: 26 | Performed by: PSYCHIATRY & NEUROLOGY

## 2020-08-10 PROCEDURE — A9270 NON-COVERED ITEM OR SERVICE: HCPCS | Performed by: HOSPITALIST

## 2020-08-10 RX ORDER — ACETAMINOPHEN 500 MG
500 TABLET ORAL EVERY 6 HOURS PRN
Qty: 30 TAB | Refills: 0 | Status: SHIPPED | OUTPATIENT
Start: 2020-08-10

## 2020-08-10 RX ORDER — AMOXICILLIN AND CLAVULANATE POTASSIUM 875; 125 MG/1; MG/1
1 TABLET, FILM COATED ORAL EVERY 12 HOURS
Qty: 6 TAB | Refills: 0 | Status: SHIPPED | OUTPATIENT
Start: 2020-08-10 | End: 2020-08-13

## 2020-08-10 RX ORDER — AMOXICILLIN AND CLAVULANATE POTASSIUM 875; 125 MG/1; MG/1
1 TABLET, FILM COATED ORAL EVERY 12 HOURS
Status: DISCONTINUED | OUTPATIENT
Start: 2020-08-10 | End: 2020-08-12 | Stop reason: HOSPADM

## 2020-08-10 RX ORDER — POTASSIUM CHLORIDE 20 MEQ/1
40 TABLET, EXTENDED RELEASE ORAL ONCE
Status: COMPLETED | OUTPATIENT
Start: 2020-08-10 | End: 2020-08-10

## 2020-08-10 RX ORDER — HALOPERIDOL 5 MG/ML
2 INJECTION INTRAMUSCULAR ONCE
Status: COMPLETED | OUTPATIENT
Start: 2020-08-10 | End: 2020-08-10

## 2020-08-10 RX ADMIN — AMOXICILLIN AND CLAVULANATE POTASSIUM 1 TABLET: 875; 125 TABLET, FILM COATED ORAL at 16:37

## 2020-08-10 RX ADMIN — HALOPERIDOL LACTATE 2 MG: 5 INJECTION, SOLUTION INTRAMUSCULAR at 02:37

## 2020-08-10 RX ADMIN — POTASSIUM CHLORIDE 40 MEQ: 1500 TABLET, EXTENDED RELEASE ORAL at 14:07

## 2020-08-10 RX ADMIN — AMPICILLIN SODIUM AND SULBACTAM SODIUM 1.5 G: 1; .5 INJECTION, POWDER, FOR SOLUTION INTRAMUSCULAR; INTRAVENOUS at 08:11

## 2020-08-10 RX ADMIN — AMPICILLIN SODIUM AND SULBACTAM SODIUM 1.5 G: 1; .5 INJECTION, POWDER, FOR SOLUTION INTRAMUSCULAR; INTRAVENOUS at 00:35

## 2020-08-10 RX ADMIN — DOCUSATE SODIUM 50 MG AND SENNOSIDES 8.6 MG 2 TABLET: 8.6; 5 TABLET, FILM COATED ORAL at 16:37

## 2020-08-10 RX ADMIN — AMPICILLIN SODIUM AND SULBACTAM SODIUM 1.5 G: 1; .5 INJECTION, POWDER, FOR SOLUTION INTRAMUSCULAR; INTRAVENOUS at 10:50

## 2020-08-10 RX ADMIN — CITALOPRAM HYDROBROMIDE 40 MG: 40 TABLET ORAL at 08:11

## 2020-08-10 ASSESSMENT — ENCOUNTER SYMPTOMS
PALPITATIONS: 0
COUGH: 0
BLURRED VISION: 0
TINGLING: 0
HALLUCINATIONS: 0
SPEECH CHANGE: 0
WEIGHT LOSS: 0
SHORTNESS OF BREATH: 0
CHILLS: 0
VOMITING: 0
PHOTOPHOBIA: 0
TREMORS: 0
FEVER: 0
CONSTIPATION: 0
ABDOMINAL PAIN: 0
DOUBLE VISION: 0
DIZZINESS: 0
NAUSEA: 0
SPUTUM PRODUCTION: 0
ORTHOPNEA: 0
HEADACHES: 0
FOCAL WEAKNESS: 0
EYE PAIN: 0
MYALGIAS: 0
NECK PAIN: 0
BACK PAIN: 0
SENSORY CHANGE: 0
DIARRHEA: 0

## 2020-08-10 ASSESSMENT — LIFESTYLE VARIABLES: SUBSTANCE_ABUSE: 0

## 2020-08-10 NOTE — FACE TO FACE
Face to Face Supporting Documentation - Home Health    The encounter with this patient was in whole or in part the primary reason for home health admission.    Date of encounter:   Patient:                    MRN:                       YOB: 2020  Rebecca Bishop  [id number]  1935     Home health to see patient for:  Skilled Nursing care for assessment, interventions & education, Physical Therapy evaluation and treatment and Occupational therapy evaluation and treatment    Skilled need for:  Comment: Physical therapy    Skilled nursing interventions to include:  Comment: Physical therapy and Occupational Therapy    Homebound status evidenced by:  Need the aid of supportive devices such as crutches, canes, wheelchairs or walkers. Leaving home requires a considerable and taxing effort. There is a normal inability to leave the home.    Community Physician to provide follow up care: Duane Patel D.O.     Optional Interventions? No      I certify the face to face encounter for this home health care referral meets the CMS requirements and the encounter/clinical assessment with the patient was, in whole, or in part, for the medical condition(s) listed above, which is the primary reason for home health care. Based on my clinical findings: the service(s) are medically necessary, support the need for home health care, and the homebound criteria are met.  I certify that this patient has had a face to face encounter by myself.  Kitty Rivas M.D. - NPI: [id number]     Yes

## 2020-08-10 NOTE — THERAPY
"Occupational Therapy   Initial Evaluation     Patient Name: Rebecca Bishop  Age:  85 y.o., Sex:  female  Medical Record #: [id number]  Today's Date: 8/9/2020       Precautions: Fall Risk, Swallow Precautions ( See Comments)    Assessment  Patient is 85 y.o. female with a diagnosis of Jaw abscess.  Pt is s/p I&D & removal of some teeth.  Additional factors influencing patient status / progress: Pt had a witnessed syncopal event by Andrey while en route to hospital.  Pt currently has some cognitive impairments, unclear if this is new or baseline.  Pt is likely functioning close to her baseline.  Pt would benefit from 24/7 supervision upon D/C.    Plan    Recommend Occupational Therapy 3 times per week until therapy goals are met for the following treatments:  Adaptive Equipment, Cognitive Skill Development, Neuro Re-Education / Balance, Self Care/Activities of Daily Living, Therapeutic Activities and Therapeutic Exercises.    DC Equipment Recommendations: Unable to determine at this time  Discharge Recommendations: Recommend home health for continued occupational therapy services     Subjective    \"I have moved so many times I don't know the address\"     Objective       08/09/20 7014   Prior Living Situation   Prior Services Intermittent Physical Support for ADL Per Service   Housing / Facility Unable To Determine At This Time  (pt can't tell me where she lives?)   Lives with - Patient's Self Care Capacity Unable To Determine At This Time  (pt stated she recently got a rommate???)   Comments pt stated a lady helps her shower 1 x/week   Cognition    Cognition / Consciousness X   Orientation Level Not Oriented to Address;Not Oriented to Reason   Level of Consciousness Alert   Ability To Follow Commands 1 Step   New Learning Impaired   Comments pt pleasant, unable to tell me where she lives, appears confused at times.  Unclear if pt will have supervision upon D/C?   ADL Assessment   Eating Modified Independent   Grooming " Supervision;Standing   Upper Body Dressing Supervision   Lower Body Dressing Supervision   Toileting Supervision   Functional Mobility   Sit to Stand Supervised   Bed, Chair, Wheelchair Transfer Supervised   Toilet Transfers Supervised   Patient / Family Goals   Patient / Family Goal #1 To go home   Short Term Goals   Short Term Goal # 1 Pt will demonstrate good safety awareness during ADL's   Short Term Goal # 2 Pt will tolerate 30 min ADL activity

## 2020-08-10 NOTE — PROGRESS NOTES
Monitor summary: SR 64-77, MA 0.28, QRS 0.08, QT 0.40, with rare PACs per strip from monitor room.

## 2020-08-10 NOTE — DISCHARGE PLANNING
Anticipated Discharge Disposition: Home with     Action: NATALIE spoke with pt's daughter Payton regarding discharge plan. Payton states patient lives at [facility] ([address] and needs to have transportation arranged back to facility. Payton stated she does not want the patient to be sent to facility without their knowledge because they lock the building down and she is afraid patient would not be able to navigate her way back in.     NATALIE called [facility] to inquire about availability of transportation back to facility. NATALIE was unable to speak to anyone at facility but left a voice message with contact info to return call. MD notified.     Barriers to Discharge: Coordinate ride back to care home-unable to make contact with facility. Per daughter, pt is not safe to discharge unattended.     Plan: Coordinate with care home to safely discharge back home.

## 2020-08-10 NOTE — CARE PLAN
Problem: Communication  Goal: The ability to communicate needs accurately and effectively will improve  Outcome: PROGRESSING AS EXPECTED  Education provided to call when needing assistance. Call light is within reach. Hourly rounding in place. Reorientation provided as needed.      Problem: Infection  Goal: Will remain free from infection  Outcome: PROGRESSING AS EXPECTED  Abx administered as ordered.

## 2020-08-10 NOTE — PROGRESS NOTES
0230: Pt became increasingly agitated when the nursing staff attempted to place her back on her telemetry monitor that she had torn off. Pt was yelling at the staff claiming they were trying to hurt her. Deescalation techniques attempted, including calling the patient's daughter and having her speak with the pt. Pt's daughter stated that the pt becomes agitated like this when she has a UTI. No interventions were successful. On call hospitalist paged and updated. Orders received to give Haldol once. Haldol given. Patient is back in bed with tele monitor in place. Will continue to monitor.     0345: Pt continues to pull of tele monitor and refusing to let it be put back on. On call hospitalist aware. Will attempt to put pt back on monitor as soon as possible.

## 2020-08-10 NOTE — PROGRESS NOTES
EEG normal    MRI w/o acute abnormalities    As per previous neurology assessment, no further recommendations at this time.

## 2020-08-10 NOTE — DISCHARGE SUMMARY
Discharge Summary    CHIEF COMPLAINT ON ADMISSION  Chief Complaint   Patient presents with   • Facial Swelling     right sided x1 week   • Syncope     en route to hospital she had episode where she lost consciousness for approx 2min. Following she is slow to respond, only verbal response was her first name with slurring. She will follow simple commands with strength deficit on left. No convulsions or abnormal movement seen by medics   • Possible Stroke     activated as stroke protocol by EMS. Onset 0905hrs       Reason for Admission  Facial swelling    Admission Date  8/8/2020    CODE STATUS  Full Code    HPI & HOSPITAL COURSE    85-year-old female with past medical history of hypertension and stroke presented to the hospital on August 8, 2020 with right mandibular pain and swelling.  On arrival paramedics witnessed a transient loss of consciousness.  A code stroke was called and she underwent extensive imaging.  She underwent CTA head and neck did not show any acute abnormalities.  She found to have swelling on her right side of the face and oral surgery Dr. Dc was consulted for surgical intervention.  She underwent Incision and drainage of the right mandibular buccal space abscess with Penrose drain placement. Extraction of teeth numbers 3,4, 5, 12, 18, 19, and 30.  Neurology evaluated her and ordered MRI brain without contrast and EEG.  Today I evaluated and examined her at the bedside.  She expressed that she is feeling better and she is wondering about her discharge.  Physical therapy and Occupational Therapy evaluated her and recommended home health.  I ordered home health for her and it was arranged prior to her discharge.  She underwent MRI brain without contrast that did not show any acute normalities.  She also underwent EEG and it did not show any acute abnormalities.  Today on the day of discharge discussed case with neurology and they recommended no further recommendations.  I called the  to  schedule an appointment with neurosurgery Dr. Dc and I prescribed her antibiotics.    Therefore, she is discharged in fair and stable condition to home with organized home healthcare and close outpatient follow-up.    The patient met 2-midnight criteria for an inpatient stay at the time of discharge.    Discharge Date  08/10/20      FOLLOW UP ITEMS POST DISCHARGE  Primary care provider  Dr. Dc    DISCHARGE DIAGNOSES  Principal Problem (Resolved):    Mandibular abscess POA: Yes  Active Problems:    Loss of consciousness (HCC) POA: Yes    Sepsis (HCC) POA: Yes    Essential hypertension POA: Yes      FOLLOW UP    Loc Dc M.D.  [address]  [place] [address]  [phone]    Go on 8/17/2020        MEDICATIONS ON DISCHARGE     Medication List      START taking these medications      Instructions   acetaminophen 500 MG Tabs  Commonly known as: TYLENOL   Take 1 Tab by mouth every 6 hours as needed (Mild Pain; (Pain scale 1-3); Temp greater than 100.5 F).  Dose: 500 mg     amoxicillin-clavulanate 875-125 MG Tabs  Commonly known as: AUGMENTIN   Take 1 Tab by mouth every 12 hours for 3 days.  Dose: 1 Tab        CONTINUE taking these medications      Instructions   amLODIPine 10 MG Tabs  Commonly known as: NORVASC   Take 10 mg by mouth every day. Indications: High Blood Pressure  Dose: 10 mg     citalopram 40 MG Tabs  Commonly known as: CELEXA   Take 40 mg by mouth every day.  Dose: 40 mg            Allergies  No Known Allergies    DIET  Orders Placed This Encounter   Procedures   • Diet Order Diabetic     Standing Status:   Standing     Number of Occurrences:   1     Order Specific Question:   Diet:     Answer:   Diabetic [3]     Order Specific Question:   Texture Modifier     Answer:   Level 4 - Pureed (Dysphagia 1)     Order Specific Question:   Liquid level     Answer:   Level 0 - Thin       ACTIVITY  As tolerated.  Weight bearing as  tolerated    CONSULTATIONS  Neurology    PROCEDURES  EEG    LABORATORY  Lab Results   Component Value Date    SODIUM 141 08/10/2020    POTASSIUM 3.5 (L) 08/10/2020    CHLORIDE 105 08/10/2020    CO2 23 08/10/2020    GLUCOSE 93 08/10/2020    BUN 16 08/10/2020    CREATININE 0.68 08/10/2020    CREATININE 0.8 04/06/2006        Lab Results   Component Value Date    WBC 11.0 (H) 08/10/2020    HEMOGLOBIN 9.9 (L) 08/10/2020    HEMATOCRIT 31.3 (L) 08/10/2020    PLATELETCT 331 08/10/2020      MR-BRAIN-W/O   Final Result      1.  No acute abnormality.   2.  Moderate cerebral atrophy.   3.  Moderate chronic microvascular ischemic disease.   4.  There has been no significant interval change.      [id number]   Final Result      Multiple dental caries. No evidence of fracture.      DX-CHEST-PORTABLE (1 VIEW)   Final Result      No evidence of acute cardiopulmonary process.      CT-CTA NECK WITH & W/O-POST PROCESSING   Final Result   Addendum 1 of 1   Additionally seen is inflammatory change anterior and inferior to the    right mandible with some gas in that area as well as a thin layer of fluid    measuring up to 4 mm in depth likely representing early premandibular    abscess. There is some phlegmonous    change seen as well as inflammatory change in that area. There is also    periodontal disease present. There some enlarged right anterior chain    cervical lymph nodes.      Final      CT-CTA HEAD WITH & W/O-POST PROCESS   Final Result      No evidence of intracranial vascular occlusion or aneurysm.      CT-CEREBRAL PERFUSION ANALYSIS   Final Result      1.  Cerebral blood flow less than 30% likely representing completed infarct = 0 mL.      2.  T Max more than 6 seconds likely representing combination of completed infarct and ischemia = 0 mL.      3.  Mismatched volume likely representing ischemic brain/penumbra = None      4.  Please note that the cerebral perfusion was performed on the limited brain tissue around  the basal ganglia region. Infarct/ischemia outside the CT perfusion sections can be missed in this study.      CT-HEAD W/O   Final Result      1.  No evidence of acute intracranial process.      2.  Cerebral atrophy as well as periventricular chronic small vessel ischemic change.            Total time of the discharge process exceeds 33 minutes.

## 2020-08-10 NOTE — DISCHARGE PLANNING
Received Choice form at  1003  Agency/Facility Name: [facility]   Referral sent per Choice form @ 1105    Agency/Facility Name: [facility]  Spoke To: Alcira  Outcome: Pending Referral acceptance, will call this CCA back with status.@1368    Agency/Facility Name: [facility]  Spoke To: Alcira  Outcome: Patient Accepted@3050

## 2020-08-10 NOTE — THERAPY
"Speech Language Pathology  Daily Treatment     Patient Name: Rebecca Bishop  Age:  85 y.o., Sex:  female  Medical Record #: [id number]  Today's Date: 8/10/2020     Precautions  Precautions: Fall Risk, Swallow Precautions ( See Comments)    Assessment  Patient seen this date for dysphagia tx session. Patient awake, alert, and pleasant, but confused and forgetful during session. Patient currently on PU4/TN0 diet and per RN, appears to be tolerating diet without difficulty. Patient reported dislike of \"getting lots of pudding\" on current diet, but was hesitant to trial upgrades. Patient agreeable with education and encouragement. Patient consumed PO trials of jello, thins via cup sip, and several bites of soft solids. Patient had no overt s/sx of aspiration on any consistencies trialed. Patient had prolonged mastication on soft solids and reported needing to chew on left side of oral cavity d/t soreness from abscess, but this still appeared functional and no fatigue was noted.     At this time, recommend patient upgrade to SB6/TN0 diet with assistance as needed. Please hold PO intake and notify SLP if any difficulty is noted. Meds as tolerated.     Plan    Continue current treatment plan.    Discharge Recommendations: Recommend home health for continued speech therapy services (recommendations may change based on cognitive evaluation results)      Objective     08/10/20 1535   Precautions   Precautions Fall Risk;Swallow Precautions ( See Comments)   Vitals   O2 Delivery Device None - Room Air   Dysphagia    Positioning / Behavior Modification Self Monitoring;Modulate Rate or Bite Size   Diet / Liquid Recommendation Soft & Bite-Sized (6) - (Dysphagia III);Thin (0)   Nursing Communication Swallow Precaution Sign Posted at Head of Bed   Skilled Intervention Verbal Cueing;Compensatory Strategies   Recommended Route of Medication Administration   Medication Administration  Whole with Liquid Wash   Short Term Goals   Short Term " Goal # 1 Pt will consume a diet of puree/thins with no s/sx of aspiration and min cues.    Goal Outcome # 1 Goal met, new goal added   Short Term Goal # 1 B  Patient will consume SB6/TN0 diet with assistance with no overt s/sx of aspiration.

## 2020-08-10 NOTE — PROCEDURES
ROUTINE ELECTROENCEPHALOGRAM REPORT      Referring provider: TABATHA Mckay    DOS: 8/10/2020 (total recording of 24 minutes)    INDICATION:  Rebecca Bishop 85 y.o. female presenting with syncope.    CURRENT ANTIEPILEPTIC REGIMEN: None.    TECHNIQUE: 30 channel routine electroencephalogram (EEG) was performed in accordance with the international 10-20 system. The study was reviewed in bipolar and referential montages. The recording examined the patient during wakeful and drowsy state(s).     DESCRIPTION OF THE RECORD:  During the wakefulness, the background showed a symmetrical 8 hz alpha activity posteriorly with amplitude of 70 mV.  There was reactivity to eye closure/opening.  A normal anterior-posterior gradient was noted with faster beta frequencies seen anteriorly.  During drowsiness, theta/delta frequencies were seen.      ACTIVATION PROCEDURES:   Intermittent Photic stimulation was performed in a stepwise fashion from 1 to 30 Hz and elicited a normal response (photic driving), most noticeable in the posterior leads.      ICTAL AND/OR INTERICTAL FINDINGS:   No focal or generalized epileptiform activity noted. No regional slowing was seen during this routine study.  No clinical events or seizures were reported or recorded during the study.     EKG: sampling of the EKG recording demonstrated sinus rhythm.       INTERPRETATION:  This is a normal routine EEG recording in the awake and drowsy state(s).  Clinical correlation is recommended.    Note: A normal EEG does not rule out epilepsy.  If the clinical suspicion remains high for seizures, a prolonged recording to capture clinical or subclinical events may be helpful.        Bonilla Ponce MD   Epilepsy and Neurodiagnostics.   Clinical  of Neurology [facility].   Diplomate in Neurology, Epilepsy, and Electrodiagnostic Medicine.   Office: [phone]  Fax: [phone]

## 2020-08-10 NOTE — CARE PLAN
Problem: Communication  Goal: The ability to communicate needs accurately and effectively will improve  Outcome: PROGRESSING AS EXPECTED     Problem: Safety  Goal: Will remain free from injury  Outcome: PROGRESSING AS EXPECTED  Goal: Will remain free from falls  Outcome: PROGRESSING AS EXPECTED     Problem: Infection  Goal: Will remain free from infection  Outcome: PROGRESSING AS EXPECTED     Problem: Venous Thromboembolism (VTW)/Deep Vein Thrombosis (DVT) Prevention:  Goal: Patient will participate in Venous Thrombosis (VTE)/Deep Vein Thrombosis (DVT)Prevention Measures  Outcome: PROGRESSING AS EXPECTED     Problem: Bowel/Gastric:  Goal: Normal bowel function is maintained or improved  Outcome: PROGRESSING AS EXPECTED  Goal: Will not experience complications related to bowel motility  Outcome: PROGRESSING AS EXPECTED     Problem: Knowledge Deficit  Goal: Knowledge of disease process/condition, treatment plan, diagnostic tests, and medications will improve  Outcome: PROGRESSING AS EXPECTED  Goal: Knowledge of the prescribed therapeutic regimen will improve  Outcome: PROGRESSING AS EXPECTED     Problem: Discharge Barriers/Planning  Goal: Patient's continuum of care needs will be met  Outcome: PROGRESSING AS EXPECTED     Problem: Fluid Volume:  Goal: Will maintain balanced intake and output  Outcome: PROGRESSING AS EXPECTED     Problem: Respiratory:  Goal: Respiratory status will improve  Outcome: PROGRESSING AS EXPECTED     Problem: Emergency Care of the Stroke Patient  Goal: Establish Time of Onset  Outcome: PROGRESSING AS EXPECTED  Note:             Goal: Medication as ordered  Outcome: PROGRESSING AS EXPECTED  Goal: Activity as appropriate  Outcome: PROGRESSING AS EXPECTED  Goal: Nutrition  Outcome: PROGRESSING AS EXPECTED  Goal: Consult Placed  Outcome: PROGRESSING AS EXPECTED     Problem: Acute Care of the Stroke Patient  Goal: Optimal Outcome for the Stroke patient  Outcome: PROGRESSING AS EXPECTED      Problem: Safety  Goal: Free from accidental injury  Outcome: PROGRESSING AS EXPECTED  Goal: Free from intentional harm  Outcome: PROGRESSING AS EXPECTED  Goal: Free from self harm  Outcome: PROGRESSING AS EXPECTED  Goal: Isolation Precautions for patient and staff safety  Outcome: PROGRESSING AS EXPECTED     Problem: Knowledge Deficit  Goal: Patient/Significant other demonstrates understanding of disease process, treatment plan, medications and discharge instructions  Outcome: PROGRESSING AS EXPECTED     Problem: Psychosocial needs  Goal: Spiritual needs incorporated in hospitalization  Outcome: PROGRESSING AS EXPECTED  Goal: Cultural needs incorporated in hospitalization  Outcome: PROGRESSING AS EXPECTED  Goal: Anxiety reduction  Outcome: PROGRESSING AS EXPECTED     Problem: Discharge Barriers/Planning  Goal: Patient's Continuum of care needs are met  Outcome: PROGRESSING AS EXPECTED     Problem: Skin Integrity  Goal: Skin Integrity is maintained or improved  Outcome: PROGRESSING AS EXPECTED     Problem: Risk for Infection, Impaired Wound Healing  Goal: Remain free from signs and symptoms of infection  Outcome: PROGRESSING AS EXPECTED  Goal: Promotion of Wound Healing and Drain Management  Outcome: PROGRESSING AS EXPECTED     Problem: Risk for Impaired Mobility--Activity Intolerance  Goal: Mobilize and/or Transfer Safely with [place]  Outcome: PROGRESSING AS EXPECTED  Goal: Activity Level appropriate for Discharge or Transfer  Outcome: PROGRESSING AS EXPECTED     Problem: Oxygenation/Respiratory Function  Goal: Patient will Achieve/Maintain Optimum Respiratory Rate/Effort  Outcome: PROGRESSING AS EXPECTED     Problem: Risk of Aspiration  Goal: Absence of aspiration  Outcome: PROGRESSING AS EXPECTED     Problem: Pain  Goal: Alleviation of Pain or a reduction in pain to the patient's comfort goal  Outcome: PROGRESSING AS EXPECTED     Problem: Hemodynamic Status  Goal: Stable Vital Signs and Fluid  Balance  Outcome: PROGRESSING AS EXPECTED     Problem: Risk for Deep Vein Thrombosis/Venous Thromboembolism  Goal: DVT/VTE Prevention Measures in Place  Outcome: PROGRESSING AS EXPECTED  Goal: Patient participates in DVT/VTE Prevention Measures  Outcome: PROGRESSING AS EXPECTED     Problem: Nutrition Deficit  Goal: Adequate Food and Fluid Intake  Outcome: PROGRESSING AS EXPECTED     Problem: Self Care Deficit  Goal: Ability to bathe, groom and dress independently or with assistance  Outcome: PROGRESSING AS EXPECTED  Goal: Ability to feed and maintain oral hygiene independently or with assistance  Outcome: PROGRESSING AS EXPECTED  Goal: Ability to toilet independently or with assistance  Outcome: PROGRESSING AS EXPECTED     Problem: Elimination  Goal: Establishment and Maintenance of regular bowel elimination  Outcome: PROGRESSING AS EXPECTED  Goal: Regular urinary elimination  Outcome: PROGRESSING AS EXPECTED     Problem: Risk for injury related to physical restraint use  Goal: Safe and appropriate use of physical restraints. Restraints discontinued at the earliest possibility while ensuring patient safety.  Outcome: PROGRESSING AS EXPECTED     Problem: Pain Management  Goal: Pain level will decrease to patient's comfort goal  Outcome: PROGRESSING AS EXPECTED     Problem: Psychosocial Needs:  Goal: Level of anxiety will decrease  Outcome: PROGRESSING AS EXPECTED

## 2020-08-10 NOTE — PROGRESS NOTES
Monitor summary: SR 67-73, NV 0.28, QRS 0.08, QT 0.44, with rare PACs and rare PVCs  per strip from monitor room.

## 2020-08-11 LAB
GLUCOSE BLD-MCNC: 103 MG/DL (ref 65–99)
GLUCOSE BLD-MCNC: 76 MG/DL (ref 65–99)
GLUCOSE BLD-MCNC: 84 MG/DL (ref 65–99)
GLUCOSE BLD-MCNC: 92 MG/DL (ref 65–99)

## 2020-08-11 PROCEDURE — 92526 ORAL FUNCTION THERAPY: CPT

## 2020-08-11 PROCEDURE — A9270 NON-COVERED ITEM OR SERVICE: HCPCS | Performed by: INTERNAL MEDICINE

## 2020-08-11 PROCEDURE — 700102 HCHG RX REV CODE 250 W/ 637 OVERRIDE(OP): Performed by: HOSPITALIST

## 2020-08-11 PROCEDURE — 82962 GLUCOSE BLOOD TEST: CPT | Mod: 91

## 2020-08-11 PROCEDURE — A9270 NON-COVERED ITEM OR SERVICE: HCPCS | Performed by: HOSPITALIST

## 2020-08-11 PROCEDURE — 99232 SBSQ HOSP IP/OBS MODERATE 35: CPT | Performed by: HOSPITALIST

## 2020-08-11 PROCEDURE — 700102 HCHG RX REV CODE 250 W/ 637 OVERRIDE(OP): Performed by: INTERNAL MEDICINE

## 2020-08-11 PROCEDURE — 92523 SPEECH SOUND LANG COMPREHEN: CPT

## 2020-08-11 PROCEDURE — 770006 HCHG ROOM/CARE - MED/SURG/GYN SEMI*

## 2020-08-11 RX ADMIN — AMLODIPINE BESYLATE 10 MG: 5 TABLET ORAL at 04:27

## 2020-08-11 RX ADMIN — DOCUSATE SODIUM 50 MG AND SENNOSIDES 8.6 MG 2 TABLET: 8.6; 5 TABLET, FILM COATED ORAL at 04:27

## 2020-08-11 RX ADMIN — AMOXICILLIN AND CLAVULANATE POTASSIUM 1 TABLET: 875; 125 TABLET, FILM COATED ORAL at 17:14

## 2020-08-11 RX ADMIN — AMOXICILLIN AND CLAVULANATE POTASSIUM 1 TABLET: 875; 125 TABLET, FILM COATED ORAL at 06:00

## 2020-08-11 RX ADMIN — CITALOPRAM HYDROBROMIDE 40 MG: 40 TABLET ORAL at 04:27

## 2020-08-11 ASSESSMENT — ENCOUNTER SYMPTOMS
HEADACHES: 0
MYALGIAS: 0
PALPITATIONS: 0
ABDOMINAL PAIN: 0
BACK PAIN: 0
PHOTOPHOBIA: 0
FOCAL WEAKNESS: 0
FEVER: 0
VOMITING: 0
HALLUCINATIONS: 0
ORTHOPNEA: 0
DIZZINESS: 0
NECK PAIN: 0
NAUSEA: 0
TREMORS: 0
DOUBLE VISION: 0
CONSTIPATION: 0
SHORTNESS OF BREATH: 0
TINGLING: 0
CHILLS: 0
EYE PAIN: 0
SPEECH CHANGE: 0
BLURRED VISION: 0
SENSORY CHANGE: 0
DIARRHEA: 0
WEIGHT LOSS: 0
SPUTUM PRODUCTION: 0
COUGH: 0

## 2020-08-11 ASSESSMENT — LIFESTYLE VARIABLES: SUBSTANCE_ABUSE: 0

## 2020-08-11 NOTE — DISCHARGE PLANNING
Anticipated Discharge Disposition: Home with [facility]    Action: Lsw called [facility] [phone]. Unable to speak to anyone, Lsw left vm.     Barriers to Discharge: Transportation to be coordinated with [facility]    Plan: Continue to follow and assist       11:35am: Lsw called no answer, left vm.

## 2020-08-11 NOTE — PROGRESS NOTES
Patient has remained confused and disoriented throughout this shift.  Patient anxious about going home.  Frequently setting off both bed and chair alarms.  Redirectable in the morning however becoming more aggitated and difficult to redirect after 3 pm this afternoon.  Potassium administered for K+ level of 3.5.  New orders received to discontinue telemetry and IV fluids due to patient frequently removing medical devices and attempting to ambulate unassisted.  Discharge orders placed.  Awaiting return call from [facility] with acceptance and transportation.  Denies pain, dyspnea or distress.  No complaints voiced.  Will continue to monitor.

## 2020-08-11 NOTE — CARE PLAN
Problem: Metabolic:  Goal: Ability to maintain appropriate glucose levels will improve  Intervention: Manage blood glucose measurement  Note: FSBS checked, no insulin required, no s/s of complication, will cont to monitor

## 2020-08-11 NOTE — DISCHARGE PLANNING
Anticipated Discharge Disposition: Home with [facility]    Action: Lsw called Cierra to confirm transportation time for tomorrow at 10:00am. Lsw left vm     Barriers to Discharge: None    Plan: Lsw to assist as needed

## 2020-08-11 NOTE — PROGRESS NOTES
Monitor Summary: SR 65-82, MS 0.24, QRS 0.08, QT 0.40, with Rare PVCs per strip from monitor room.

## 2020-08-11 NOTE — DISCHARGE PLANNING
Anticipated Discharge Disposition: Home with [facility]    Action: Lsw spoke with Cierra. Cierra stated that she would like for DC Sum to be faxed to [facility] at [phone] before pt returns. Cierra stated that they do not have an opening for transportation today, but will have one tomorrow at 10:00am. Lsw stated they will update with MD to see if that would be okay.     Barriers to Discharge: Transportation    Plan: Lsw to update MD and [facility]iary

## 2020-08-11 NOTE — DISCHARGE PLANNING
Anticipated Discharge Disposition: Home with [facility]    Action: Lsw called Payton to inform that transportation will be set for tomorrow at 10:00am. Payton is in agreement.     Barriers to Discharge: none    Plan: Lsw will assist and follow as needed

## 2020-08-11 NOTE — CARE PLAN
Problem: Safety  Goal: Will remain free from injury  Intervention: Provide assistance with mobility  Note: Assisted pt up to bathroom, fww in use, pt tolerated well, free from fall

## 2020-08-11 NOTE — PROGRESS NOTES
[facility] Medicine Daily Progress Note    Date of Service  8/11/2020    Chief Complaint  85 y.o. female admitted 8/8/2020 with right mandibular pain and swelling    Hospital Course    *85-year-old female with past medical history of hypertension and stroke presented to the hospital on August 8, 2020 with right mandibular pain and swelling.  On arrival paramedics witnessed a transient loss of consciousness.  A code stroke was called and she underwent extensive imaging.  She underwent CT head and neck did not show any acute abnormalities.  She found to have swelling on her right side of the face and oral surgery Dr. Dc was consulted for surgical intervention.  She underwent Incision and drainage of the right mandibular buccal space abscess with Penrose drain placement. Extraction of teeth numbers 3,4, 5, 12, 18, 19, and 30.  Neurology evaluated her and ordered MRI brain without contrast and EEG.*      Interval Problem Update  No acute events overnight  Mild facial edema but improving   Pending discharge back to facility     Consultants/Specialty  Neurology  Oral surgery    Code Status  Full Code    Disposition  To be decided    Review of Systems  Review of Systems   Constitutional: Negative for chills, fever and weight loss.   HENT: Negative for hearing loss and tinnitus.         Jaw pain and swelling   Eyes: Negative for blurred vision, double vision, photophobia and pain.   Respiratory: Negative for cough, sputum production and shortness of breath.    Cardiovascular: Negative for chest pain, palpitations, orthopnea and leg swelling.   Gastrointestinal: Negative for abdominal pain, constipation, diarrhea, nausea and vomiting.   Genitourinary: Negative for dysuria, frequency and urgency.   Musculoskeletal: Negative for back pain, joint pain, myalgias and neck pain.   Skin: Negative for rash.   Neurological: Negative for dizziness, tingling, tremors, sensory change, speech change, focal weakness and headaches.    Psychiatric/Behavioral: Negative for hallucinations and substance abuse.   All other systems reviewed and are negative.       Physical Exam  Temp:  [36.3 °C (97.4 °F)-37.4 °C (99.4 °F)] 36.9 °C (98.5 °F)  Pulse:  [60-79] 62  Resp:  [16-18] 18  BP: (121-157)/(57-65) 124/57  SpO2:  [91 %-93 %] 92 %    Physical Exam  Vitals signs reviewed.   Constitutional:       General: She is not in acute distress.     Appearance: Normal appearance. She is not ill-appearing.   HENT:      Head: Normocephalic and atraumatic.      Nose: No congestion.   Eyes:      General:         Right eye: No discharge.         Left eye: No discharge.      Pupils: Pupils are equal, round, and reactive to light.   Neck:      Musculoskeletal: Normal range of motion. No neck rigidity.      Comments: Right jaw swelling  Cardiovascular:      Rate and Rhythm: Normal rate and regular rhythm.      Pulses: Normal pulses.      Heart sounds: Normal heart sounds. No murmur.   Pulmonary:      Effort: Pulmonary effort is normal. No respiratory distress.      Breath sounds: Normal breath sounds. No stridor.   Abdominal:      General: Bowel sounds are normal. There is no distension.      Palpations: Abdomen is soft.      Tenderness: There is no abdominal tenderness.   Musculoskeletal: Normal range of motion.         General: No swelling or tenderness.   Skin:     General: Skin is warm.      Capillary Refill: Capillary refill takes less than 2 seconds.      Coloration: Skin is not jaundiced or pale.      Findings: No bruising.   Neurological:      General: No focal deficit present.      Mental Status: She is alert.      Cranial Nerves: No cranial nerve deficit.      Comments: She knows that she is in the hospital.  She was able to follow commands per  No focal neurological deficit.   Psychiatric:         Mood and Affect: Mood normal.         Behavior: Behavior normal.         Cognition and Memory: Cognition is impaired. Memory is impaired. She exhibits impaired  recent memory and impaired remote memory.     Performed physical exam in August 10,020 and it remains similar.    Fluids    Intake/Output Summary (Last 24 hours) at 8/11/2020 1330  Last data filed at 8/11/2020 0900  Gross per 24 hour   Intake 1060 ml   Output --   Net 1060 ml       Laboratory  Recent Labs     08/10/20  0903   WBC 11.0*   RBC 3.61*   HEMOGLOBIN 9.9*   HEMATOCRIT 31.3*   MCV 86.7   MCH 27.4   MCHC 31.6*   RDW 49.0   PLATELETCT 331   MPV 9.6     Recent Labs     08/10/20  0903   SODIUM 141   POTASSIUM 3.5*   CHLORIDE 105   CO2 23   GLUCOSE 93   BUN 16   CREATININE 0.68   CALCIUM 8.6             Recent Labs     08/09/20  0418   TRIGLYCERIDE 63   HDL 83   LDL 75       Imaging  MR-BRAIN-W/O   Final Result      1.  No acute abnormality.   2.  Moderate cerebral atrophy.   3.  Moderate chronic microvascular ischemic disease.   4.  There has been no significant interval change.      [id number]   Final Result      Multiple dental caries. No evidence of fracture.      DX-CHEST-PORTABLE (1 VIEW)   Final Result      No evidence of acute cardiopulmonary process.      CT-CTA NECK WITH & W/O-POST PROCESSING   Final Result   Addendum 1 of 1   Additionally seen is inflammatory change anterior and inferior to the    right mandible with some gas in that area as well as a thin layer of fluid    measuring up to 4 mm in depth likely representing early premandibular    abscess. There is some phlegmonous    change seen as well as inflammatory change in that area. There is also    periodontal disease present. There some enlarged right anterior chain    cervical lymph nodes.      Final      CT-CTA HEAD WITH & W/O-POST PROCESS   Final Result      No evidence of intracranial vascular occlusion or aneurysm.      CT-CEREBRAL PERFUSION ANALYSIS   Final Result      1.  Cerebral blood flow less than 30% likely representing completed infarct = 0 mL.      2.  T Max more than 6 seconds likely representing combination of  completed infarct and ischemia = 0 mL.      3.  Mismatched volume likely representing ischemic brain/penumbra = None      4.  Please note that the cerebral perfusion was performed on the limited brain tissue around the basal ganglia region. Infarct/ischemia outside the CT perfusion sections can be missed in this study.      CT-HEAD W/O   Final Result      1.  No evidence of acute intracranial process.      2.  Cerebral atrophy as well as periventricular chronic small vessel ischemic change.           Assessment/Plan  * Sepsis (HCC)- (present on admission)  Assessment & Plan  This is Sepsis Present on admission  SIRS criteria identified on my evaluation include: Leukocytosis, with WBC greater than 12,000  Source is mandibular abscess  Sepsis protocol initiated  Fluid resuscitation ordered per protocol  IV antibiotics as appropriate for source of sepsis: IV Unasyn  While organ dysfunction may be noted elsewhere in this problem list or in the chart, degree of organ dysfunction does not meet CMS criteria for severe sepsis    Continue antibiotics.  I prescribed her Augmentin for outpatient.  Source control by oral surgery  She remains afebrile.  Cultures negative to date.        Loss of consciousness (HCC)- (present on admission)  Assessment & Plan  Transient episode witnessed by paramedics  Now resolved.  Imaging studies do not show any acute or maladies per  MRI brain without contrast showed no acute abnormalities.  EEG did not show any acute normalities.  Discussed with neurology due to no acute findings further recommendations        Essential hypertension- (present on admission)  Assessment & Plan  Continue norvasc with holding parameters     VTE prophylaxis: SCDs

## 2020-08-11 NOTE — DISCHARGE PLANNING
"Anticipated Discharge Disposition: Home with [facility]    Action: Lsw spoke with dtr Payton. Payton stated that she has spoken with \"resident \" Cierra [phone]. [facility] has a transportation van but per Cierra, there is not enough staff. Lsw stated that Lsw will give Cierra a call to coordinate transportation.    Barriers to Discharge: Transportation to [facility]    Plan: Lsw to coordinate transportation     "

## 2020-08-11 NOTE — PROGRESS NOTES
[facility] Medicine Daily Progress Note    Date of Service  8/10/2020    Chief Complaint  85 y.o. female admitted 8/8/2020 with right mandibular pain and swelling    Hospital Course    *85-year-old female with past medical history of hypertension and stroke presented to the hospital on August 8, 2020 with right mandibular pain and swelling.  On arrival paramedics witnessed a transient loss of consciousness.  A code stroke was called and she underwent extensive imaging.  She underwent CT head and neck did not show any acute abnormalities.  She found to have swelling on her right side of the face and oral surgery Dr. Dc was consulted for surgical intervention.  She underwent Incision and drainage of the right mandibular buccal space abscess with Penrose drain placement. Extraction of teeth numbers 3,4, 5, 12, 18, 19, and 30.  Neurology evaluated her and ordered MRI brain without contrast and EEG.*      Interval Problem Update  I evaluated and examined her at the bedside.  She denies any acute complaints per  She expressed that her jaw pain has been improving.  I discussed finding of EEG and MRI brain with neurologist and no further recommendation from neurology standpoint as both studies did not show any acute abnormalities.  I discussed plan of care with case management regarding her discharge planning.  I ordered home health and it was arranged.  Patient lives at assisted living facility and case management was unable to contact assisted living facility.    Consultants/Specialty  Neurology  Oral surgery    Code Status  Full Code    Disposition  To be decided    Review of Systems  Review of Systems   Constitutional: Negative for chills, fever and weight loss.   HENT: Negative for hearing loss and tinnitus.         Jaw pain and swelling   Eyes: Negative for blurred vision, double vision, photophobia and pain.   Respiratory: Negative for cough, sputum production and shortness of breath.    Cardiovascular: Negative for  chest pain, palpitations, orthopnea and leg swelling.   Gastrointestinal: Negative for abdominal pain, constipation, diarrhea, nausea and vomiting.   Genitourinary: Negative for dysuria, frequency and urgency.   Musculoskeletal: Negative for back pain, joint pain, myalgias and neck pain.   Skin: Negative for rash.   Neurological: Negative for dizziness, tingling, tremors, sensory change, speech change, focal weakness and headaches.   Psychiatric/Behavioral: Negative for hallucinations and substance abuse.   All other systems reviewed and are negative.       Physical Exam  Temp:  [36.1 °C (96.9 °F)-36.5 °C (97.7 °F)] 36.4 °C (97.5 °F)  Pulse:  [63-79] 79  Resp:  [16] 16  BP: (102-130)/(52-75) 130/65  SpO2:  [90 %-93 %] 93 %    Physical Exam  Vitals signs reviewed.   Constitutional:       General: She is not in acute distress.     Appearance: Normal appearance. She is not ill-appearing.   HENT:      Head: Normocephalic and atraumatic.      Nose: No congestion.   Eyes:      General:         Right eye: No discharge.         Left eye: No discharge.      Pupils: Pupils are equal, round, and reactive to light.   Neck:      Musculoskeletal: Normal range of motion. No neck rigidity.      Comments: Right jaw swelling  Cardiovascular:      Rate and Rhythm: Normal rate and regular rhythm.      Pulses: Normal pulses.      Heart sounds: Normal heart sounds. No murmur.   Pulmonary:      Effort: Pulmonary effort is normal. No respiratory distress.      Breath sounds: Normal breath sounds. No stridor.   Abdominal:      General: Bowel sounds are normal. There is no distension.      Palpations: Abdomen is soft.      Tenderness: There is no abdominal tenderness.   Musculoskeletal: Normal range of motion.         General: No swelling or tenderness.   Skin:     General: Skin is warm.      Capillary Refill: Capillary refill takes less than 2 seconds.      Coloration: Skin is not jaundiced or pale.      Findings: No bruising.    Neurological:      General: No focal deficit present.      Mental Status: She is alert.      Cranial Nerves: No cranial nerve deficit.      Comments: She knows that she is in the hospital.  She was able to follow commands per  No focal neurological deficit.   Psychiatric:         Mood and Affect: Mood normal.         Behavior: Behavior normal.         Cognition and Memory: Cognition is impaired. Memory is impaired. She exhibits impaired recent memory and impaired remote memory.     Performed physical exam in August 10,020 and it remains similar.    Fluids    Intake/Output Summary (Last 24 hours) at 8/10/2020 1818  Last data filed at 8/10/2020 0800  Gross per 24 hour   Intake 240 ml   Output --   Net 240 ml       Laboratory  Recent Labs     08/08/20  0915 08/10/20  0903   WBC 17.6* 11.0*   RBC 4.24 3.61*   HEMOGLOBIN 11.4* 9.9*   HEMATOCRIT 36.1* 31.3*   MCV 85.1 86.7   MCH 26.9* 27.4   MCHC 31.6* 31.6*   RDW 49.3 49.0   PLATELETCT 360 331   MPV 9.7 9.6     Recent Labs     08/08/20  0915 08/10/20  0903   SODIUM 138 141   POTASSIUM 4.0 3.5*   CHLORIDE 101 105   CO2 20 23   GLUCOSE 204* 93   BUN 26* 16   CREATININE 1.04 0.68   CALCIUM 9.2 8.6     Recent Labs     08/08/20  0915   APTT 35.9   INR 1.06         Recent Labs     08/09/20  0418   TRIGLYCERIDE 63   HDL 83   LDL 75       Imaging  MR-BRAIN-W/O   Final Result      1.  No acute abnormality.   2.  Moderate cerebral atrophy.   3.  Moderate chronic microvascular ischemic disease.   4.  There has been no significant interval change.      FG-XAREEUDY-IRFOPPMIT   Final Result      Multiple dental caries. No evidence of fracture.      DX-CHEST-PORTABLE (1 VIEW)   Final Result      No evidence of acute cardiopulmonary process.      CT-CTA NECK WITH & W/O-POST PROCESSING   Final Result   Addendum 1 of 1   Additionally seen is inflammatory change anterior and inferior to the    right mandible with some gas in that area as well as a thin layer of fluid    measuring up to 4  mm in depth likely representing early premandibular    abscess. There is some phlegmonous    change seen as well as inflammatory change in that area. There is also    periodontal disease present. There some enlarged right anterior chain    cervical lymph nodes.      Final      CT-CTA HEAD WITH & W/O-POST PROCESS   Final Result      No evidence of intracranial vascular occlusion or aneurysm.      CT-CEREBRAL PERFUSION ANALYSIS   Final Result      1.  Cerebral blood flow less than 30% likely representing completed infarct = 0 mL.      2.  T Max more than 6 seconds likely representing combination of completed infarct and ischemia = 0 mL.      3.  Mismatched volume likely representing ischemic brain/penumbra = None      4.  Please note that the cerebral perfusion was performed on the limited brain tissue around the basal ganglia region. Infarct/ischemia outside the CT perfusion sections can be missed in this study.      CT-HEAD W/O   Final Result      1.  No evidence of acute intracranial process.      2.  Cerebral atrophy as well as periventricular chronic small vessel ischemic change.           Assessment/Plan  Sepsis (HCC)- (present on admission)  Assessment & Plan  This is Sepsis Present on admission  SIRS criteria identified on my evaluation include: Leukocytosis, with WBC greater than 12,000  Source is mandibular abscess  Sepsis protocol initiated  Fluid resuscitation ordered per protocol  IV antibiotics as appropriate for source of sepsis: IV Unasyn  While organ dysfunction may be noted elsewhere in this problem list or in the chart, degree of organ dysfunction does not meet CMS criteria for severe sepsis    Continue antibiotics.  I prescribed her Augmentin for outpatient.  She remains afebrile.  Cultures negative to date.        Loss of consciousness (HCC)- (present on admission)  Assessment & Plan  Transient episode witnessed by paramedics  Now resolved.  Imaging studies do not show any acute or maladies  per  MRI brain without contrast showed no acute abnormalities.  EEG did not show any acute normalities.  Discussed with neurology due to no acute findings further recommendations.        Essential hypertension- (present on admission)  Assessment & Plan  Continue norvasc with holding parameters     I discussed plan of care during multidisciplinary rounds and with neurology.  VTE prophylaxis: SCDs

## 2020-08-11 NOTE — THERAPY
"Speech Language Pathology   Initial Assessment     Patient Name: Rebecca Bishop  AGE:  85 y.o., SEX:  female  Medical Record #: [id number]  Today's Date: 8/11/2020     Precautions: Fall Risk, Swallow Precautions (See Comments)    Assessment    Patient is 85 y.o. female admitted 8/8/20 due to right jaw pain and swelling. PMHx: HTN, CVA. CMHx: mandibular abscess, sepsis, loss of consciousness, essential HTN. Head CT 8/8/2020 \"No evidence of intracranial vascular occlusion or aneurysm.\" CXR 8/8/2020 \"No evidence of acute cardiopulmonary process.\"    Pt seen today for cognitive-linguistic evaluation. Pt was alert and oriented to day; however, was disoriented to month, date, year and place. Pt was administered Cognistat (The Neurobehavioral Cognitive Status Examination.) Pt scored WFL for measures of LOC, Attention, Language Comprehension and Repetition. Pt scored in the MILD range for measures of Language Naming, Calculations and Verbal Reasoning. Pt scored in the SEVERE range for Constructions and Memory. Pt was unable to complete informal testing of reading comprehension and following written directions secondary to visual deficits (reading glasses not in room.) During medication task, Pt reported that she has a pill box; however, does not need it as she has her medications dispensed to her daily at the [facility] in which she [facility].    Recommend ongoing assistance with finances and medication management, as well as cooking/cleaning. If Pt's [facility] and/or family are unable to provide substantial support, recommend inpatient transitional care services for continued speech therapy services. Otherwise, recommend outpatient or home health transitional care services for continued speech therapy services.    Note: Recommend neuropsych consult to further evaluate Pt's current functioning.    Plan    Recommend Speech Therapy 3 times per week until therapy goals are met for the following treatments:  Dysphagia Training, Cognitive-Linguistic " Training and Patient / Family / Caregiver Education.    Discharge Recommendations: Recommend home health for continued speech therapy services       Objective       08/11/20 1540   Verbal Expression   Verbal Output Conversation Within Functional Limits (6-7)   Verbal Output Functional Within Functional Limits (6-7)   Naming Minimal (4)   Word Finding Deficits Supervision (5)   Auditory Comprehension   Follows One Unit Commands Within Functional Limits (6-7)   Follows Two Unit Commands Within Functional Limits (6-7)   Follows Three Unit Commands Within Functional Limits (6-7)   Understands Simple, Structured Conversation  Supervision (5)   Reading Comprehension   Reading Comprehension (WDL)   (Unable to assess as Pt does not have her glasses)   Cognitive-Linguistic   Level of Consciousness Alert   Orientation Level Not Oriented to Year;Not Oriented to Month;Not Oriented to Day;Not Oriented to Place   Sustained Attention Within Functional Limits (6-7)   Short Term Memory Severe (2)   Immediate Memory Within Functional Limits (6-7)   [place] Reasoning Minimal (4)   Abstract Reasoning Moderate (3)   Outcome Measures   Outcome Measures Utilized   (Cognistat)   Short Term Goals   Short Term Goal # 2 NEW 8/11: pt will score 75% accuracy during delayed memory tasks with mod-A   Short Term Goal # 3 NEW 8/11: Pt will score +25/30 on O-Log with mod-A.   Problem List   Problem List Cognitive-Linguistic Deficits;Dysphagia

## 2020-08-12 VITALS
WEIGHT: 110.8 LBS | TEMPERATURE: 97.5 F | RESPIRATION RATE: 16 BRPM | HEIGHT: 61 IN | HEART RATE: 80 BPM | OXYGEN SATURATION: 92 % | SYSTOLIC BLOOD PRESSURE: 139 MMHG | BODY MASS INDEX: 20.92 KG/M2 | DIASTOLIC BLOOD PRESSURE: 76 MMHG

## 2020-08-12 PROBLEM — R40.20 LOSS OF CONSCIOUSNESS (HCC): Status: RESOLVED | Noted: 2020-08-08 | Resolved: 2020-08-12

## 2020-08-12 LAB
GLUCOSE BLD-MCNC: 100 MG/DL (ref 65–99)
GLUCOSE BLD-MCNC: 88 MG/DL (ref 65–99)

## 2020-08-12 PROCEDURE — A9270 NON-COVERED ITEM OR SERVICE: HCPCS | Performed by: INTERNAL MEDICINE

## 2020-08-12 PROCEDURE — 99239 HOSP IP/OBS DSCHRG MGMT >30: CPT | Performed by: HOSPITALIST

## 2020-08-12 PROCEDURE — 97535 SELF CARE MNGMENT TRAINING: CPT

## 2020-08-12 PROCEDURE — 82962 GLUCOSE BLOOD TEST: CPT | Mod: 91

## 2020-08-12 PROCEDURE — 700102 HCHG RX REV CODE 250 W/ 637 OVERRIDE(OP): Performed by: INTERNAL MEDICINE

## 2020-08-12 PROCEDURE — A9270 NON-COVERED ITEM OR SERVICE: HCPCS | Performed by: HOSPITALIST

## 2020-08-12 PROCEDURE — 700102 HCHG RX REV CODE 250 W/ 637 OVERRIDE(OP): Performed by: HOSPITALIST

## 2020-08-12 RX ADMIN — CITALOPRAM HYDROBROMIDE 40 MG: 40 TABLET ORAL at 06:18

## 2020-08-12 RX ADMIN — AMOXICILLIN AND CLAVULANATE POTASSIUM 1 TABLET: 875; 125 TABLET, FILM COATED ORAL at 06:18

## 2020-08-12 RX ADMIN — AMLODIPINE BESYLATE 10 MG: 5 TABLET ORAL at 06:18

## 2020-08-12 ASSESSMENT — COGNITIVE AND FUNCTIONAL STATUS - GENERAL
DAILY ACTIVITIY SCORE: 21
DRESSING REGULAR LOWER BODY CLOTHING: A LITTLE
SUGGESTED CMS G CODE MODIFIER DAILY ACTIVITY: CJ
HELP NEEDED FOR BATHING: A LITTLE
TOILETING: A LITTLE

## 2020-08-12 NOTE — DISCHARGE PLANNING
Anticipated Discharge Disposition: C     Action: Lsw received notice from [employer] that transportation has yet to arrive. Lsw called [facility] ([phone]), Lsw left  requesting for transportation explanation.     Barriers to Discharge: late transportation     Plan: Lsw to f/u and assist as needed

## 2020-08-12 NOTE — DISCHARGE INSTRUCTIONS
Discharge Instructions    Discharged to home by medical transportation with escort. Discharged via wheelchair, hospital escort: Yes.  Special equipment needed: Not Applicable    Be sure to schedule a follow-up appointment with your primary care doctor or any specialists as instructed.     Discharge Plan:   Diet Plan: Discussed  Activity Level: Discussed  Confirmed Follow up Appointment: No (Comments)  Confirmed Symptoms Management: Discussed  Medication Reconciliation Updated: Yes    I understand that a diet low in cholesterol, fat, and sodium is recommended for good health. Unless I have been given specific instructions below for another diet, I accept this instruction as my diet prescription.   Other diet: diabetic, soft and bite size, thin liquid    Special Instructions: None    · Is patient discharged on Warfarin / Coumadin?   No     Depression / Suicide Risk    As you are discharged from this [facility], it is important to learn how to keep safe from harming yourself.    Recognize the warning signs:  · Abrupt changes in personality, positive or negative- including increase in energy   · Giving away possessions  · Change in eating patterns- significant weight changes-  positive or negative  · Change in sleeping patterns- unable to sleep or sleeping all the time   · Unwillingness or inability to communicate  · Depression  · Unusual sadness, discouragement and loneliness  · Talk of wanting to die  · Neglect of personal appearance   · Rebelliousness- reckless behavior  · Withdrawal from people/activities they love  · Confusion- inability to concentrate     If you or a loved one observes any of these behaviors or has concerns about self-harm, here's what you can do:  · Talk about it- your feelings and reasons for harming yourself  · Remove any means that you might use to hurt yourself (examples: pills, rope, extension cords, firearm)  · Get professional help from the community (Mental Health, Substance  Abuse, psychological counseling)  · Do not be alone:Call your Safe Contact- someone whom you trust who will be there for you.  · Call your local CRISIS HOTLINE 283-7953 or 840-282-4709  · Call your local Children's Mobile Crisis Response Team [place] (220) 621-0221 or www.GIVVER  · Call the toll free National Suicide Prevention Hotlines   · National Suicide Prevention Lifeline 587-904-TLJC (0389)  · [place] [facility] 800-SUICIDE (358-0998)      DISCHARGE INSTRUCTIONS PER Kitty Rivas M.D.    Diagnosis: Facial swelling    Warm compresses to swollen areas 2-3 times daily for 5 days.  Diet soft.  Salt water rinses 3-4 times daily.  Brush teeth BID carefully around sutured sites.     Follow-up:  1 week in office in office with Dr. Dc    I prescribed you antibiotic.  Please take medication as prescribed.  Follow-up with primary care provider as well as with Dr. Dc.

## 2020-08-12 NOTE — THERAPY
Occupational Therapy  Daily Treatment     Patient Name: Rebecca Bishop  Age:  85 y.o., Sex:  female  Medical Record #: [id number]  Today's Date: 8/12/2020     Precautions  Precautions: Fall Risk, Swallow Precautions ( See Comments)    Assessment    Has met acute OT goals. Reports she gets assistance w/ IADLs, has FWW at home. Reports a woman comes to help her shower. She denies any further deficits at this time.    Plan    Discharge secondary to goals met.    DC Equipment Recommendations: None  Discharge Recommendations: Recommend home health for continued occupational therapy services       08/12/20 1310   Cognition    Cognition / Consciousness WDL   Level of Consciousness Alert   Comments pleasant and cooperative   Activities of Daily Living   Grooming Supervision;Standing   Functional Mobility   Sit to Stand Supervised   Bed, Chair, Wheelchair Transfer Supervised   Mobility chair>out to [place]   Short Term Goals   Short Term Goal # 1 Pt will demonstrate good safety awareness during ADL's   Goal Outcome # 1 Goal met   Short Term Goal # 2 Pt will tolerate 30 min ADL activity   Goal Outcome # 2 Progressing as expected

## 2020-08-12 NOTE — DISCHARGE SUMMARY
Discharge Summary    CHIEF COMPLAINT ON ADMISSION  Chief Complaint   Patient presents with   • Facial Swelling     right sided x1 week   • Syncope     en route to hospital she had episode where she lost consciousness for approx 2min. Following she is slow to respond, only verbal response was her first name with slurring. She will follow simple commands with strength deficit on left. No convulsions or abnormal movement seen by medics   • Possible Stroke     activated as stroke protocol by EMS. Onset 0905hrs       Reason for Admission  stroke     Admission Date  8/8/2020    CODE STATUS  Full Code    HPI & HOSPITAL COURSE  85-year-old female with past medical history of hypertension and stroke presented to the hosp85-year-old female with past medical history of hypertension and stroke presented to the hospital on August 8, 2020 with right mandibular pain and swelling. On arrival paramedics witnessed a transient loss of consciousness. A code stroke was called and she underwent extensive imaging. She underwent CTA head and neck did not show any acute abnormalities. She found to have swelling on her right side of the face and oral surgery Dr. Dc was consulted for surgical intervention. She underwent Incision and drainage of the right mandibular buccal space abscess with Penrose drain placement. Extraction of teeth numbers 3,4, 5, 12, 18, 19, and 30. Neurology evaluated her and ordered MRI brain without contrast and EEG. Today I evaluated and examined her at the bedside. She expressed that she is feeling better and she is wondering about her discharge. Physical therapy and Occupational Therapy evaluated her and recommended home health. I ordered home health for her and it was arranged prior to her discharge. She underwent MRI brain without contrast that did not show any acute normalities. She also underwent EEG and it did not show any acute abnormalities. Today on the day of discharge discussed case with neurology and  they recommended no further recommendations. I called the  to schedule an appointment with neurosurgery Dr. Dc and I prescribed her antibiotics.      Therefore, she is discharged in good and stable condition to skilled nursing facility.    The patient met 2-midnight criteria for an inpatient stay at the time of discharge.    Discharge Date  8/12/20    FOLLOW UP ITEMS POST DISCHARGE  Dr Dc    DISCHARGE DIAGNOSES  Principal Problem:    Sepsis (HCC) POA: Yes  Active Problems:    Essential hypertension POA: Yes  Resolved Problems:    Loss of consciousness (HCC) POA: Yes    Mandibular abscess POA: Yes      FOLLOW UP  No future appointments.  Loc Dc M.D.  [address]  [facility] [address]  [phone]    Go on 8/17/2020  Please call the office from the parking lot to check in  at 1:45 pm for your appointment at 2:00pm. You will need to wear a mask. Thank you       MEDICATIONS ON DISCHARGE     Medication List      START taking these medications      Instructions   acetaminophen 500 MG Tabs  Commonly known as: TYLENOL   Take 1 Tab by mouth every 6 hours as needed (Mild Pain; (Pain scale 1-3); Temp greater than 100.5 F).  Dose: 500 mg     amoxicillin-clavulanate 875-125 MG Tabs  Commonly known as: AUGMENTIN   Take 1 Tab by mouth every 12 hours for 3 days.  Dose: 1 Tab        CONTINUE taking these medications      Instructions   amLODIPine 10 MG Tabs  Commonly known as: NORVASC   Take 10 mg by mouth every day. Indications: High Blood Pressure  Dose: 10 mg     citalopram 40 MG Tabs  Commonly known as: CELEXA   Take 40 mg by mouth every day.  Dose: 40 mg            Allergies  No Known Allergies    DIET  Orders Placed This Encounter   Procedures   • Diet Order Diabetic     Standing Status:   Standing     Number of Occurrences:   1     Order Specific Question:   Diet:     Answer:   Diabetic [3]     Order Specific Question:   Texture Modifier     Answer:   Level 6 - Soft & Bite Sized (Dysphagia 3)      Order Specific Question:   Liquid level     Answer:   Level 0 - Thin       ACTIVITY  As tolerated.  Weight bearing as tolerated    CONSULTATIONS  Oral surgery    PROCEDURES  DATE OF SERVICE:  08/08/2020     PREOPERATIVE DIAGNOSES:  1.  Right mandibular buccal space abscess.  2.  Decayed and infected tooth #30.  3.  Advanced dental decay, teeth numbers 3, 4, 5, 12, 18, and 19.     POSTOPERATIVE DIAGNOSES:  1.  Right mandibular buccal space abscess.  2.  Decayed and infected tooth #30.  3.  Advanced dental decay, teeth numbers 3, 4, 5, 12, 18, and 19.     PROCEDURES:  1.  Intraoral incision and drainage of the right mandibular buccal space   abscess with Penrose drain placement.  2.  Extraction of teeth # 3, 4, 5, 12, 18, 19, and 30.     SURGEON:  Loc Dc MD, DDS     ASSISTANT SURGEON:  None.     ANESTHESIA:  General anesthesia with oral endotracheal intubation.     ANESTHESIOLOGIST:  Scott Cuevas MD     INDICATION:  This patient is an 85-year-old woman with hypertension and   cerebrovascular disease who presented to the [facility] Emergency Department today   after an episode of syncope for rule out stroke and for evaluation of her   right lower facial swelling.  Oral surgery consultation was called.  I saw the   patient, she was found to have a right mandibular buccal space abscess   associated with decayed infected tooth #30.  She was also found to have   multiple other teeth with advanced decay and chronic infection.  Intraoral   incision and drainage of her abscess was recommended under general anesthesia   with extraction of teeth numbers 3, 4, 5, 18, 19, and 30.  She was noted to be   febrile with an elevated white blood cell count and neutrophilia.  Urgent   treatment was indicated.  The patient was met n.p.o. status and was scheduled   for the operating room for the recommended procedure.  The risks, benefits,   and alternatives were described and discussed in detail.  All questions were   answered and  formal written consent was obtained to proceed.     PROCEDURE DESCRIPTION:  The patient was taken to the operating room at [facility] on the afternoon of 08/08/2020.  She   was placed in supine position and general anesthesia with oral endotracheal   intubation was performed without complication by Dr. Cuevas.  The patient   was positioned, prepped, and draped in the usual fashion for an intraoral   incision and drainage and dental extraction procedure.  IV antibiotics had   been administered preoperatively.     The oropharynx was suctioned and a throat pack was placed.  Attention was   turned to the right lower quadrant of the mouth.  A 1 cm incision was made   through mucosa and submucosa into an abscess cavity, which tracked anteriorly.    Approximately 2 mL of pus was drained.  Loculations were explored bluntly   with a curette and further drainage was achieved.  A smaller incision was made   anteriorly into the cavity of the abscess.  The site was flushed copiously   with normal saline and a quarter-inch Penrose drain was placed into the   anterior portion of the incision and tacked into place with a 3-0 chromic gut   suture.  Tooth #30 was then extracted with periodontal separation, root   separation using forceps and removal of the roots individually with elevators   and a rongeur.  The socket was curetted thoroughly of apical granulation   tissue and was irrigated with saline.  The socket was then tacked closed with   interrupted 3-0 chromic gut sutures.     Attention was then turned to the other decayed teeth.  Teeth #3, #4, and #5   were extracted with periodontal separation, luxation of the roots with an   elevator, and removal with forceps and a rongeur.  Bone was smoothed with a   rongeur.  The sockets were curetted thoroughly and irrigated with saline.  The   soft tissues were tacked with interrupted 3-0 chromic gut sutures.  Tooth #12   was extracted with  periodontal separation, forceps luxation and removal of 2   roots individually.  Socket was curetted and irrigated.  Attention was then   turned to the lower left quadrant where teeth #18 was noted to be vertically   fractured with extensive granulation tissue.  This tooth was elevated from the   socket in fragments and removed.  Heavy granulation tissue was curetted   thoroughly.  Tooth #19 was then extracted with a root separation using a   cowhorn forceps followed by root luxation with elevators and removal with   forceps.  Sockets were curetted thoroughly and irrigated with saline.  The   soft tissues were tacked with interrupted 3-0 chromic gut sutures.     The throat pack was removed and the oropharynx was suctioned.  Gauze was   placed over the extraction sockets for hemostasis.  The patient undraped and   cleansed and turned over to anesthesia for emergence and extubation.  This was   performed without complication by Dr. Cuevas.  The patient was transferred   to the PACU awake and in stable condition.     ESTIMATED BLOOD LOSS:  Minimal.     SPECIMENS:  None (teeth discarded).     DRAINS:  A Penrose drain placed into the right lower buccal space abscess.     COMPLICATIONS:  None.     DISPOSITION:  After recovery in the PACU, the patient will be admitted to the   floor under the care of the medicine service for postoperative pain control,   IV antibiotics, and observation.  Anticipate a 24-48 hour stay for IV   antibiotics.  Her drain can be removed prior to discharge.  She can continue a   5-day course of oral antibiotics.    LABORATORY  Lab Results   Component Value Date    SODIUM 141 08/10/2020    POTASSIUM 3.5 (L) 08/10/2020    CHLORIDE 105 08/10/2020    CO2 23 08/10/2020    GLUCOSE 93 08/10/2020    BUN 16 08/10/2020    CREATININE 0.68 08/10/2020    CREATININE 0.8 04/06/2006        Lab Results   Component Value Date    WBC 11.0 (H) 08/10/2020    HEMOGLOBIN 9.9 (L) 08/10/2020    HEMATOCRIT 31.3 (L)  08/10/2020    PLATELETCT 331 08/10/2020        Total time of the discharge process exceeds 35 minutes.

## 2020-08-12 NOTE — PROGRESS NOTES
Discharging patient home per physician order.  Verbalized understanding of discharge instructions and educational handouts. All questions answered. Belongings with patient at time of discharge. Discharge paperwork was signed by 2 RNs d/t patient confused, alert and oriented to self and place only. Paperwork place in pt's chart. Pt is picked up by medical transportation. ID of transportation copied and placed in chart.

## 2020-08-12 NOTE — DISCHARGE PLANNING
Anticipated Discharge Disposition: [facility]    Action: Lsw received vm from [facility] stating there was a situation with transportation this morning, but are available now to  pt. Lsw left vm stating where pt is located, Neuro floor [facility] floor 1 room S195-2.    Barriers to Discharge: Transportation    Plan: Lsw to continue to call [facility]    11:45: Lsw called [facility], no answer.

## 2020-08-13 LAB
BACTERIA BLD CULT: NORMAL
BACTERIA BLD CULT: NORMAL
SIGNIFICANT IND 70042: NORMAL
SIGNIFICANT IND 70042: NORMAL
SITE SITE: NORMAL
SITE SITE: NORMAL
SOURCE SOURCE: NORMAL
SOURCE SOURCE: NORMAL

## 2021-01-14 DIAGNOSIS — Z23 NEED FOR VACCINATION: ICD-10-CM

## 2021-10-25 ENCOUNTER — APPOINTMENT (OUTPATIENT)
Dept: RADIOLOGY | Facility: MEDICAL CENTER | Age: 86
End: 2021-10-25
Attending: EMERGENCY MEDICINE
Payer: MEDICARE

## 2021-10-25 ENCOUNTER — HOSPITAL ENCOUNTER (EMERGENCY)
Facility: MEDICAL CENTER | Age: 86
End: 2021-10-25
Attending: EMERGENCY MEDICINE
Payer: MEDICARE

## 2021-10-25 VITALS
BODY MASS INDEX: 17.94 KG/M2 | HEART RATE: 75 BPM | WEIGHT: 95 LBS | SYSTOLIC BLOOD PRESSURE: 125 MMHG | TEMPERATURE: 97.5 F | HEIGHT: 61 IN | OXYGEN SATURATION: 95 % | RESPIRATION RATE: 16 BRPM | DIASTOLIC BLOOD PRESSURE: 95 MMHG

## 2021-10-25 DIAGNOSIS — E86.0 DEHYDRATION: ICD-10-CM

## 2021-10-25 DIAGNOSIS — N30.00 ACUTE CYSTITIS WITHOUT HEMATURIA: ICD-10-CM

## 2021-10-25 LAB
ALBUMIN SERPL BCP-MCNC: 4.6 G/DL (ref 3.2–4.9)
ALBUMIN/GLOB SERPL: 1.4 G/DL
ALP SERPL-CCNC: 95 U/L (ref 30–99)
ALT SERPL-CCNC: 22 U/L (ref 2–50)
AMPHET UR QL SCN: NEGATIVE
ANION GAP SERPL CALC-SCNC: 15 MMOL/L (ref 7–16)
AST SERPL-CCNC: 32 U/L (ref 12–45)
BARBITURATES UR QL SCN: NEGATIVE
BASOPHILS # BLD AUTO: 1 % (ref 0–1.8)
BASOPHILS # BLD: 0.08 K/UL (ref 0–0.12)
BENZODIAZ UR QL SCN: NEGATIVE
BILIRUB SERPL-MCNC: 0.4 MG/DL (ref 0.1–1.5)
BUN SERPL-MCNC: 17 MG/DL (ref 8–22)
BZE UR QL SCN: NEGATIVE
CALCIUM SERPL-MCNC: 10 MG/DL (ref 8.5–10.5)
CANNABINOIDS UR QL SCN: NEGATIVE
CHLORIDE SERPL-SCNC: 92 MMOL/L (ref 96–112)
CO2 SERPL-SCNC: 24 MMOL/L (ref 20–33)
CREAT SERPL-MCNC: 1.16 MG/DL (ref 0.5–1.4)
EOSINOPHIL # BLD AUTO: 0.09 K/UL (ref 0–0.51)
EOSINOPHIL NFR BLD: 1.1 % (ref 0–6.9)
ERYTHROCYTE [DISTWIDTH] IN BLOOD BY AUTOMATED COUNT: 50.6 FL (ref 35.9–50)
ETHANOL BLD-MCNC: <10.1 MG/DL (ref 0–10)
GLOBULIN SER CALC-MCNC: 3.2 G/DL (ref 1.9–3.5)
GLUCOSE SERPL-MCNC: 106 MG/DL (ref 65–99)
HCG SERPL QL: NEGATIVE
HCT VFR BLD AUTO: 43.2 % (ref 37–47)
HGB BLD-MCNC: 14.7 G/DL (ref 12–16)
IMM GRANULOCYTES # BLD AUTO: 0.09 K/UL (ref 0–0.11)
IMM GRANULOCYTES NFR BLD AUTO: 1.1 % (ref 0–0.9)
LYMPHOCYTES # BLD AUTO: 1.4 K/UL (ref 1–4.8)
LYMPHOCYTES NFR BLD: 17.1 % (ref 22–41)
MCH RBC QN AUTO: 30.1 PG (ref 27–33)
MCHC RBC AUTO-ENTMCNC: 34 G/DL (ref 33.6–35)
MCV RBC AUTO: 88.3 FL (ref 81.4–97.8)
METHADONE UR QL SCN: NEGATIVE
MONOCYTES # BLD AUTO: 0.89 K/UL (ref 0–0.85)
MONOCYTES NFR BLD AUTO: 10.8 % (ref 0–13.4)
NEUTROPHILS # BLD AUTO: 5.66 K/UL (ref 2–7.15)
NEUTROPHILS NFR BLD: 68.9 % (ref 44–72)
NRBC # BLD AUTO: 0 K/UL
NRBC BLD-RTO: 0 /100 WBC
OPIATES UR QL SCN: NEGATIVE
OXYCODONE UR QL SCN: NEGATIVE
PCP UR QL SCN: NEGATIVE
PLATELET # BLD AUTO: 257 K/UL (ref 164–446)
PMV BLD AUTO: 10.3 FL (ref 9–12.9)
POTASSIUM SERPL-SCNC: 4.8 MMOL/L (ref 3.6–5.5)
PROPOXYPH UR QL SCN: NEGATIVE
PROT SERPL-MCNC: 7.8 G/DL (ref 6–8.2)
RBC # BLD AUTO: 4.89 M/UL (ref 4.2–5.4)
SODIUM SERPL-SCNC: 131 MMOL/L (ref 135–145)
WBC # BLD AUTO: 8.2 K/UL (ref 4.8–10.8)

## 2021-10-25 PROCEDURE — 99285 EMERGENCY DEPT VISIT HI MDM: CPT

## 2021-10-25 PROCEDURE — 96374 THER/PROPH/DIAG INJ IV PUSH: CPT

## 2021-10-25 PROCEDURE — 82077 ASSAY SPEC XCP UR&BREATH IA: CPT

## 2021-10-25 PROCEDURE — 700105 HCHG RX REV CODE 258: Performed by: EMERGENCY MEDICINE

## 2021-10-25 PROCEDURE — 80053 COMPREHEN METABOLIC PANEL: CPT

## 2021-10-25 PROCEDURE — 85025 COMPLETE CBC W/AUTO DIFF WBC: CPT

## 2021-10-25 PROCEDURE — 71045 X-RAY EXAM CHEST 1 VIEW: CPT

## 2021-10-25 PROCEDURE — 700111 HCHG RX REV CODE 636 W/ 250 OVERRIDE (IP): Performed by: EMERGENCY MEDICINE

## 2021-10-25 PROCEDURE — 80307 DRUG TEST PRSMV CHEM ANLYZR: CPT

## 2021-10-25 PROCEDURE — 84703 CHORIONIC GONADOTROPIN ASSAY: CPT

## 2021-10-25 PROCEDURE — 96361 HYDRATE IV INFUSION ADD-ON: CPT

## 2021-10-25 PROCEDURE — 70450 CT HEAD/BRAIN W/O DYE: CPT | Mod: ME

## 2021-10-25 RX ORDER — SODIUM CHLORIDE 9 MG/ML
1000 INJECTION, SOLUTION INTRAVENOUS ONCE
Status: COMPLETED | OUTPATIENT
Start: 2021-10-25 | End: 2021-10-25

## 2021-10-25 RX ORDER — CEFTRIAXONE 1 G/1
1 INJECTION, POWDER, FOR SOLUTION INTRAMUSCULAR; INTRAVENOUS ONCE
Status: COMPLETED | OUTPATIENT
Start: 2021-10-25 | End: 2021-10-25

## 2021-10-25 RX ORDER — CEFTRIAXONE 1 G/1
1 INJECTION, POWDER, FOR SOLUTION INTRAMUSCULAR; INTRAVENOUS ONCE
Status: DISCONTINUED | OUTPATIENT
Start: 2021-10-25 | End: 2021-10-25

## 2021-10-25 RX ORDER — CEFDINIR 300 MG/1
300 CAPSULE ORAL 2 TIMES DAILY
Qty: 10 CAPSULE | Refills: 0 | Status: SHIPPED | OUTPATIENT
Start: 2021-10-25 | End: 2021-10-30

## 2021-10-25 RX ADMIN — CEFTRIAXONE SODIUM 1 G: 1 INJECTION, POWDER, FOR SOLUTION INTRAMUSCULAR; INTRAVENOUS at 15:15

## 2021-10-25 RX ADMIN — SODIUM CHLORIDE 1000 ML: 9 INJECTION, SOLUTION INTRAVENOUS at 12:15

## 2021-10-25 ASSESSMENT — FIBROSIS 4 INDEX: FIB4 SCORE: 0.88

## 2021-10-25 NOTE — ED TRIAGE NOTES
Chief Complaint   Patient presents with   • Tired     Pt BIB EMS from Care facility. report that pt has had an increase in lethargy. pt with hx of dementia/encephalopathy, baseline A&Ox1/GCS13.  PTA.

## 2021-10-25 NOTE — ED NOTES
Breaking primary RN:     Provided pt with additional warm blankets. Pt resting in bed, call light in reach and side rails up.

## 2021-10-25 NOTE — ED PROVIDER NOTES
ED Provider Note    CHIEF COMPLAINT  Chief Complaint   Patient presents with   • Tired     Pt BIB EMS from Care facility. report that pt has had an increase in lethargy. pt with hx of dementia/encephalopathy, baseline A&Ox1/GCS13.  PTA.        HPI  Rebecca Bishop is a 86 y.o. female who presents with a chief complaint of increasing lethargy.  This is from the nurses and EMS note.  Patient cannot give much of the history.  Patient is alert and oriented to person only.    Patient has a past history of stroke and hypertension looking her past history she has had some pubic rami fractures she had a wrist fracture she had history of dental infection.  Apparently is unknown duration of this patient's lethargy there is been no history to know if there is any alleviating exacerbating factors or associated symptoms.    REVIEW OF SYSTEMS  Review of systems are difficult to obtain the patient answers no to everything but I am able to discern what the issue is  If this is true as the patient has a history of dementia  Thin female stated age  PAST MEDICAL HISTORY  Past Medical History:   Diagnosis Date   • Hypertension    • Stroke (HCC)    • TIA (transient ischemic attack) 1/2015       FAMILY HISTORY  Family History   Problem Relation Age of Onset   • Cancer Mother         breast cancer   • Cancer Sister         breast cancer       SOCIAL HISTORY  Social History     Socioeconomic History   • Marital status:      Spouse name: Not on file   • Number of children: Not on file   • Years of education: Not on file   • Highest education level: Not on file   Occupational History   • Not on file   Tobacco Use   • Smoking status: Never Smoker   • Smokeless tobacco: Never Used   Vaping Use   • Vaping Use: Never used   Substance and Sexual Activity   • Alcohol use: Not Currently     Comment: SOCIAL   • Drug use: No   • Sexual activity: Not on file   Other Topics Concern   • Not on file   Social History Narrative   • Not on file      Social Determinants of Health     Financial Resource Strain:    • Difficulty of Paying Living Expenses:    Food Insecurity:    • Worried About Running Out of Food in the Last Year:    • Ran Out of Food in the Last Year:    Transportation Needs:    • Lack of Transportation (Medical):    • Lack of Transportation (Non-Medical):    Physical Activity:    • Days of Exercise per Week:    • Minutes of Exercise per Session:    Stress:    • Feeling of Stress :    Social Connections:    • Frequency of Communication with Friends and Family:    • Frequency of Social Gatherings with Friends and Family:    • Attends Religion Services:    • Active Member of Clubs or Organizations:    • Attends Club or Organization Meetings:    • Marital Status:    Intimate Partner Violence:    • Fear of Current or Ex-Partner:    • Emotionally Abused:    • Physically Abused:    • Sexually Abused:        SURGICAL HISTORY  Past Surgical History:   Procedure Laterality Date   • [place] DENTAL SURGERY PROCEDURE N/A 8/8/2020    Procedure: EXTRACTION, TOOTH;  Surgeon: Loc Dc M.D.;  Location: SURGERY [facility];  Service: Dental   • SUBMANDIBLE ABSCESS INCISION AND DRAINAGE N/A 8/8/2020    Procedure: INCISION AND DRAINAGE, ABSCESS, SUBMANDIBULAR REGION;  Surgeon: Loc Dc M.D.;  Location: SURGERY [facility];  Service: Dental   • GASTROSCOPY-ENDO N/A 5/12/2019    Procedure: GASTROSCOPY;  Surgeon: Anastacia Smith M.D.;  Location: [facility];  Service: Gastroenterology   • COLONOSCOPY - ENDO N/A 5/12/2019    Procedure: COLONOSCOPY;  Surgeon: Anastacia Smith M.D.;  Location: ENDOSCOPY [facility];  Service: Gastroenterology   • HIP NAILING INTRAMEDULLARY Right 10/10/2018    Procedure: HIP NAILING INTRAMEDULLARY;  Surgeon: Ricardo Luna M.D.;  Location: SURGERY [facility];  Service: Orthopedics   • ANKLE ORIF Right 6/1/2017    Procedure: ANKLE ORIF;  Surgeon: Ricardo Luna M.D.;  Location: SURGERY  [facility];  Service:        CURRENT MEDICATIONS  Home Medications    **Home medications have not yet been reviewed for this encounter**          ALLERGIES  No Known Allergies    PHYSICAL EXAM  VITAL SIGNS: /79   Pulse 66   Temp 36.5 °C (97.7 °F) (Temporal)   Resp 16   Ht 1.549 m (5' 1\")   Wt 43.1 kg (95 lb)   SpO2 98%   BMI 17.95 kg/m²   Constitutional:  Thin female stated age  HENT: Normocephalic, Atraumatic, Bilateral external ears normal, Oropharynx dry  Eyes: PERRLA, EOMI, Conjunctiva normal, No discharge.   Musculoskeletal: Neck has normal range of motion, No tenderness, Supple.   Lymphatic: No cervical lymphadenopathy noted.   Cardiovascular: Normal heart rate, Normal rhythm, No murmurs, No rubs, No gallops.   Thorax & Lungs: Normal breath sounds, No respiratory distress, No wheezing, No chest tenderness.   Abdomen: Nondistended nontender  Skin: Positive skin tenting.   : No CVA tenderness.   Psychiatric: Calm appearing  Neurologic: Alert and oriented to name only.    RADIOLOGY/PROCEDURES  DX-CHEST-PORTABLE (1 VIEW)   Final Result      No acute cardiopulmonary abnormality.         CT-HEAD W/O    (Results Pending)     Results for orders placed or performed during the hospital encounter of 10/25/21   CBC WITH DIFFERENTIAL   Result Value Ref Range    WBC 8.2 4.8 - 10.8 K/uL    RBC 4.89 4.20 - 5.40 M/uL    Hemoglobin 14.7 12.0 - 16.0 g/dL    Hematocrit 43.2 37.0 - 47.0 %    MCV 88.3 81.4 - 97.8 fL    MCH 30.1 27.0 - 33.0 pg    MCHC 34.0 33.6 - 35.0 g/dL    RDW 50.6 (H) 35.9 - 50.0 fL    Platelet Count 257 164 - 446 K/uL    MPV 10.3 9.0 - 12.9 fL    Neutrophils-Polys 68.90 44.00 - 72.00 %    Lymphocytes 17.10 (L) 22.00 - 41.00 %    Monocytes 10.80 0.00 - 13.40 %    Eosinophils 1.10 0.00 - 6.90 %    Basophils 1.00 0.00 - 1.80 %    Immature Granulocytes 1.10 (H) 0.00 - 0.90 %    Nucleated RBC 0.00 /100 WBC    Neutrophils (Absolute) 5.66 2.00 - 7.15 K/uL    Lymphs (Absolute) 1.40 1.00 - 4.80 K/uL "    Monos (Absolute) 0.89 (H) 0.00 - 0.85 K/uL    Eos (Absolute) 0.09 0.00 - 0.51 K/uL    Baso (Absolute) 0.08 0.00 - 0.12 K/uL    Immature Granulocytes (abs) 0.09 0.00 - 0.11 K/uL    NRBC (Absolute) 0.00 K/uL   COMP METABOLIC PANEL   Result Value Ref Range    Sodium 131 (L) 135 - 145 mmol/L    Potassium 4.8 3.6 - 5.5 mmol/L    Chloride 92 (L) 96 - 112 mmol/L    Co2 24 20 - 33 mmol/L    Anion Gap 15.0 7.0 - 16.0    Glucose 106 (H) 65 - 99 mg/dL    Bun 17 8 - 22 mg/dL    Creatinine 1.16 0.50 - 1.40 mg/dL    Calcium 10.0 8.5 - 10.5 mg/dL    AST(SGOT) 32 12 - 45 U/L    ALT(SGPT) 22 2 - 50 U/L    Alkaline Phosphatase 95 30 - 99 U/L    Total Bilirubin 0.4 0.1 - 1.5 mg/dL    Albumin 4.6 3.2 - 4.9 g/dL    Total Protein 7.8 6.0 - 8.2 g/dL    Globulin 3.2 1.9 - 3.5 g/dL    A-G Ratio 1.4 g/dL   HCG QUAL SERUM   Result Value Ref Range    Beta-Hcg Qualitative Serum Negative Negative   DIAGNOSTIC ALCOHOL   Result Value Ref Range    Diagnostic Alcohol <10.1 0.0 - 10.0 mg/dL   URINE DRUG SCREEN (TRIAGE)   Result Value Ref Range    Amphetamines Urine Negative Negative    Barbiturates Negative Negative    Benzodiazepines Negative Negative    Cocaine Metabolite Negative Negative    Methadone Negative Negative    Opiates Negative Negative    Oxycodone Negative Negative    Phencyclidine -Pcp Negative Negative    Propoxyphene Negative Negative    Cannabinoid Metab Negative Negative   ESTIMATED GFR   Result Value Ref Range    GFR If  54 (A) >60 mL/min/1.73 m 2    GFR If Non  44 (A) >60 mL/min/1.73 m 2        COURSE & MEDICAL DECISION MAKING  Pertinent Labs & Imaging studies reviewed. (See chart for details)  Altered mental status etiology unknown and afebrile patient with normal vital signs with a history of dementia.  This point differential is wide infectious cause, metabolic cause are among the differential.  At this point there is no history of trauma but because of her age intracranial pathology cannot  be ruled out as well.  Plan  1.  Urinalysis to rule out UTI   2.  CBC metabolic panel for abnormality such as elevated white cell count suggest infection metabolic abnormality hypoglycemia electrolyte abnormality  3.  Chest x-ray rule out infection  4.  CT head rule out intracranial pathology such as brain bleed.      We will going give her gentle hydration is appears appears to be dehydrated clinically and due to her dementia at this point are not faithfully giving her oral fluids    1:26 PM  [place] she is awake and alert pleasant.  According to the nurse she is able to sit and the commode and give a urine.  At this point clinically she is significantly improved I did speak to the daughter who was concerned because there had over weekend.  She is having absence seizure's.  At this age and with her symptoms perhaps this was just mild dehydration she did receive 200cc and clinically looks well    This is a 86-year-old female who clinically has improved here in the ER what appeared to be lethargic but has a history of baseline dementia there is been no elevated white cell count no infection were waiting for urine.  X-ray looks clear there is an oxygen of 84% which I am uncertain of the etiology perhaps this could have been overmedication.  At this point will plan on discharging her even if she has UTI so they can give her a dose of antibiotics.  Dispo pending.    2:59 PM  Patient continues to improve and actually awake and alert appropriate.  But obviously with dementia.  At this point, the entire hospital when on critical divert as the entire chart system went down and ordering system.  Somehow during this time patient's urinalysis was lost.  Because of the patient's history of frequent UTIs because of the significant waiting room and the fact of her own critical divert will discharge this patient.  Urinalysis is still pending she will get 1 dose of ceftriaxone.  Prescription of Omnicef.  At this point the patient be  safely discharged home.  She responded to IV hydration.         FINAL IMPRESSION  1.  Dehydration  2.  Altered mental status improved   3.  Suspected UTI      Electronically signed by: Chevy Haider M.D., 10/25/2021 11:43 AM

## 2022-12-28 NOTE — ASSESSMENT & PLAN NOTE
A user error has taken place: encounter opened in error, closed for administrative reasons  controlled

## (undated) DEVICE — SWAB ANAEROBIC SPEC.COLLECTOR - (25/PK 4PK/CA 100EA/CA)

## (undated) DEVICE — TUBING CLEARLINK DUO-VENT - C-FLO (48EA/CA)

## (undated) DEVICE — SET EXTENSION WITH 2 PORTS (48EA/CA) ***PART #2C8610 IS A SUBSTITUTE*****

## (undated) DEVICE — STAPLER SKIN DISP - (6/BX 10BX/CA) VISISTAT

## (undated) DEVICE — GUIDE PIN 1.25X150 THRD OIC - (6EA/BX) (5TX6=30)

## (undated) DEVICE — LACTATED RINGERS INJ 1000 ML - (14EA/CA 60CA/PF)

## (undated) DEVICE — SET LEADWIRE 5 LEAD BEDSIDE DISPOSABLE ECG (1SET OF 5/EA)

## (undated) DEVICE — HEMOSTAT SURG ABSORBABLE - 4 X 8 IN SURGICEL (24EA/CA)

## (undated) DEVICE — SOD. CHL 10CC SYRINGE PREFILL - W/10 CC (30/BX)

## (undated) DEVICE — SYRINGE DISP. 50CC LS - (40/BX)

## (undated) DEVICE — CATHETER IV 14 GA X 2 ---SURG.& SDS ONLY---(200EA/CA)

## (undated) DEVICE — SYRINGE 30 ML LL (56/BX)

## (undated) DEVICE — TRAY SRGPRP PVP IOD WT PRP - (20/CA)

## (undated) DEVICE — DRILL BIT 2.8MM X 155MM CALIBRATED (8TX2=16)

## (undated) DEVICE — DRAPE C-ARM LARGE 41IN X 74 IN - (10/BX 2BX/CA)

## (undated) DEVICE — CON SEDATION/>5 YR 1ST 15 MIN

## (undated) DEVICE — TUBE CONNECTING SUCTION - CLEAR PLASTIC STERILE 72 IN (50EA/CA)

## (undated) DEVICE — BIT DRILL LONG CALIBRATED 4.2MM X 330MM (4TX2=8)

## (undated) DEVICE — SUTURE 3-0 ETHILON FSLX 30 (36PK/BX)"

## (undated) DEVICE — KIT PROCEDURE DOUBLE ENDO ONLY (5/CA)

## (undated) DEVICE — TOWELS CLOTH SURGICAL - (4/PK 20PK/CA)

## (undated) DEVICE — SODIUM CHL IRRIGATION 0.9% 1000ML (12EA/CA)

## (undated) DEVICE — CANISTER SUCTION 3000ML MECHANICAL FILTER AUTO SHUTOFF MEDI-VAC NONSTERILE LF DISP  (40EA/CA)

## (undated) DEVICE — PROTECTOR ULNA NERVE - (36PR/CA)

## (undated) DEVICE — ELECTRODE DUAL RETURN W/ CORD - (50/PK)

## (undated) DEVICE — SUTURE GENERAL

## (undated) DEVICE — WATER IRRIG. STER. 1500 ML - (9/CA)

## (undated) DEVICE — SYRINGE 6 CC 20 GA X 1 1/2 - NDL SAFETY  (50/BX)

## (undated) DEVICE — DRAPE U ORTHOPEDIC - (10/BX)

## (undated) DEVICE — SUCTION INSTRUMENT YANKAUER BULBOUS TIP W/O VENT (50EA/CA)

## (undated) DEVICE — GLOVE BIOGEL PI INDICATOR SZ 7.5 SURGICAL PF LF -(50/BX 4BX/CA)

## (undated) DEVICE — CON SEDATION EA ADDL 15 MIN

## (undated) DEVICE — GLOVE BIOGEL SZ 8 SURGICAL PF LTX - (50PR/BX 4BX/CA)

## (undated) DEVICE — NEEDLE NON SAFETY 25 GA X 1 1/2 IN HYPO (100EA/BX)

## (undated) DEVICE — KIT ANESTHESIA W/CIRCUIT & 3/LT BAG W/FILTER (20EA/CA)

## (undated) DEVICE — DRAPE C ARMOR (12EA/CA)

## (undated) DEVICE — SODIUM CHL. INJ. 0.9% 500ML (24EA/CA 50CA/PF)

## (undated) DEVICE — GLOVE BIOGEL INDICATOR SZ 7.5 SURGICAL PF LTX - (50PR/BX 4BX/CA)

## (undated) DEVICE — GOWN SURGEONS X-LARGE - DISP. (30/CA)

## (undated) DEVICE — CHLORAPREP 26 ML APPLICATOR - ORANGE TINT(25/CA)

## (undated) DEVICE — DRESSING XEROFORM 1X8 - (50/BX 4BX/CA)

## (undated) DEVICE — SYRINGE EAR/NOSE 3 OZ STERILE (50/CA

## (undated) DEVICE — BLADE SURGICAL #15 - (50/BX 3BX/CA)

## (undated) DEVICE — SUTURE 4-0 VICRYL PLUS RB-1 - 27 INCH (36/BX)

## (undated) DEVICE — DRAPE SURGICAL U 77X120 - (10/CA)

## (undated) DEVICE — GLOVE BIOGEL SZ 6.5 SURGICAL PF LTX (50PR/BX 4BX/CA)

## (undated) DEVICE — MASK, LARYNGEAL AIRWAY #4

## (undated) DEVICE — DRAPE LARGE 3 QUARTER - (20/CA)

## (undated) DEVICE — GLOVE BIOGEL SZ 7.5 SURGICAL PF LTX - (50PR/BX 4BX/CA)

## (undated) DEVICE — MASK ANESTHESIA ADULT  - (100/CA)

## (undated) DEVICE — GLOVE BIOGEL PI INDICATOR SZ 7.0 SURGICAL PF LF - (50/BX 4BX/CA)

## (undated) DEVICE — DRAIN PENROSE STERILE 1/4 X - 18 IN  (25EA/BX)

## (undated) DEVICE — DRILL BIT 2.7 X 160 CANN OIC - (5TX2=10)

## (undated) DEVICE — NEPTUNE 4 PORT MANIFOLD - (20/PK)

## (undated) DEVICE — LEAD SET 6 DISP. EKG NIHON KOHDEN

## (undated) DEVICE — SUTURE 2-0 VICRYL PLUS CT-1 - 8 X 18 INCH(12/BX)

## (undated) DEVICE — GAUZE FLUFF STERILE 2-PLY 36 X 36 (100EA/CA)

## (undated) DEVICE — HEAD HOLDER JUNIOR/ADULT

## (undated) DEVICE — DRILL BIT 1.8MMX80MM CALIBRATED (4TX2=8)

## (undated) DEVICE — BANDAGE ROLL STERILE BULKEE 4.5 IN X 4 YD (100EA/CA)

## (undated) DEVICE — SUTURE 3-0 SILK FS 18 INCH - (12PK/BX)

## (undated) DEVICE — PACK MINOR BASIN - (2EA/CA)

## (undated) DEVICE — GLOVE BIOGEL INDICATOR SZ 8 SURGICAL PF LTX - (50/BX 4BX/CA)

## (undated) DEVICE — JAW BRA #93

## (undated) DEVICE — TOOTHBRUSH ADULT (72EA/CA)

## (undated) DEVICE — CUFF BP ADULT MEDIUM DISPOSABLE (20EA/CA)

## (undated) DEVICE — PEROXIDE HYDROGEN 3% 32 OZ. (12EA/BX)

## (undated) DEVICE — SYRINGE 10 ML CONTROL LL (25EA/BX 4BX/CA)

## (undated) DEVICE — GLOVE BIOGEL ECLIPSE PF LATEX SIZE 8.5 (50PR/BX)

## (undated) DEVICE — GUIDE PIN CALIBRATED (5EA/PK) (4TX6=24)

## (undated) DEVICE — GOWN WARMING STANDARD FLEX - (30/CA)

## (undated) DEVICE — KIT CUSTOM PROCEDURE SINGLE FOR ENDO  (15/CA)

## (undated) DEVICE — BITE BLOCK ADULT 60FR (100EA/CA)

## (undated) DEVICE — BANDAGE ELASTIC 6 HONEYCOMB - 6X5YD LF (20/CA)"

## (undated) DEVICE — SENSOR SPO2 NEO LNCS ADHESIVE (20/BX) SEE USER NOTES

## (undated) DEVICE — SLEEVE, VASO, THIGH, MED

## (undated) DEVICE — PACK MAJOR ORTHO - (2EA/CA)

## (undated) DEVICE — DRAPE MAGNETIC (INSTRA-MAG) - (30/CA)

## (undated) DEVICE — SPONGE GAUZESTER 4 X 4 4PLY - (128PK/CA)

## (undated) DEVICE — BASIN EMESIS DISP. - (250/CA)

## (undated) DEVICE — CANISTER SUCTION RIGID RED 1500CC (40EA/CA)

## (undated) DEVICE — CONTAINER, SPECIMEN, STERILE

## (undated) DEVICE — SUTURE 3-0 ETHILON FS-1 - (36/BX) 30 INCH

## (undated) DEVICE — DRAPE STRLE REG TOWEL 18X24 - (10/BX 4BX/CA)"

## (undated) DEVICE — DRILL BIT 1.8MMX110MM GOLD (6TX2=12)

## (undated) DEVICE — CANNULA W/ SUPPLY TUBING O2 - (50/CA)

## (undated) DEVICE — ELECTRODE 850 FOAM ADHESIVE - HYDROGEL RADIOTRNSPRNT (50/PK)

## (undated) DEVICE — GLOVE SZ 7 BIOGEL PI MICRO - PF LF (50PR/BX 4BX/CA)

## (undated) DEVICE — CONTAINER SPECIMEN BAG OR - STERILE 4 OZ W/LID (100EA/CA)

## (undated) DEVICE — SUTURE 3-0 CHROMIC GUT FS-2 27 (36PK/BX)"

## (undated) DEVICE — GLOVE BIOGEL ECLIPSE  PF LATEX SIZE 6.5 (50PR/BX)

## (undated) DEVICE — GLOVE BIOGEL ECLIPSE PF LATEX SIZE 8.0  (50PR/BX)

## (undated) DEVICE — PADDING CAST 6 IN STERILE - 6 X 4 YDS (24/CA)

## (undated) DEVICE — KIT ROOM DECONTAMINATION

## (undated) DEVICE — BLOCK

## (undated) DEVICE — MASK AIRWAY SIZE 3 UNIQUE SILICON (10/BX)

## (undated) DEVICE — GLOVE BIOGEL INDICATOR SZ 7SURGICAL PF LTX - (50/BX 4BX/CA)

## (undated) DEVICE — PACK LOWER EXTREMITY - (2/CA)

## (undated) DEVICE — FILM CASSETTE ENDO

## (undated) DEVICE — SYRINGE 3 CC 22 GA X 1-1/2 - NDL SAFETY (50/BX 8BX/CA)

## (undated) DEVICE — SPONGE GAUZE NON-STERILE 4X4 - (2000/CA 10PK/CA)

## (undated) DEVICE — BOVIE NEEDLE TIP INSULATD NON-SAFETY 2CM (50/PK)